# Patient Record
Sex: FEMALE | Race: WHITE | Employment: FULL TIME | URBAN - METROPOLITAN AREA
[De-identification: names, ages, dates, MRNs, and addresses within clinical notes are randomized per-mention and may not be internally consistent; named-entity substitution may affect disease eponyms.]

---

## 2017-09-11 PROBLEM — M19.041 ARTHRITIS OF RIGHT HAND: Status: ACTIVE | Noted: 2017-09-11

## 2017-09-11 PROBLEM — M19.042 ARTHRITIS OF LEFT HAND: Status: ACTIVE | Noted: 2017-09-11

## 2018-05-22 ENCOUNTER — HOSPITAL ENCOUNTER (OUTPATIENT)
Dept: GENERAL RADIOLOGY | Age: 53
Discharge: HOME | End: 2018-05-24
Payer: COMMERCIAL

## 2018-05-22 ENCOUNTER — HOSPITAL ENCOUNTER (OUTPATIENT)
Age: 53
Discharge: HOME | End: 2018-05-24
Payer: COMMERCIAL

## 2018-05-22 DIAGNOSIS — M25.511 ARTHRALGIA OF RIGHT ACROMIOCLAVICULAR JOINT: ICD-10-CM

## 2018-05-22 PROCEDURE — 73000 X-RAY EXAM OF COLLAR BONE: CPT

## 2019-02-15 ENCOUNTER — HOSPITAL ENCOUNTER (OUTPATIENT)
Age: 54
Discharge: HOME | End: 2019-02-17
Payer: COMMERCIAL

## 2019-02-15 DIAGNOSIS — M19.049 ARTHRITIS PAIN OF HAND: ICD-10-CM

## 2019-02-15 DIAGNOSIS — M79.645 PAIN OF FINGER OF LEFT HAND: ICD-10-CM

## 2019-02-15 PROCEDURE — 86200 CCP ANTIBODY: CPT

## 2019-02-18 LAB — CCP IGG ANTIBODIES: 2 UNITS (ref 0–19)

## 2020-07-05 ENCOUNTER — HOSPITAL ENCOUNTER (EMERGENCY)
Age: 55
Discharge: HOME | End: 2020-07-05
Payer: COMMERCIAL

## 2020-07-05 VITALS
OXYGEN SATURATION: 99 % | BODY MASS INDEX: 21.6 KG/M2 | SYSTOLIC BLOOD PRESSURE: 123 MMHG | TEMPERATURE: 98.6 F | DIASTOLIC BLOOD PRESSURE: 76 MMHG | HEIGHT: 60 IN | RESPIRATION RATE: 16 BRPM | WEIGHT: 110 LBS | HEART RATE: 80 BPM

## 2020-07-05 PROCEDURE — 99212 OFFICE O/P EST SF 10 MIN: CPT

## 2020-07-05 RX ORDER — IBUPROFEN 800 MG/1
800 TABLET ORAL EVERY 6 HOURS PRN
Qty: 21 TABLET | Refills: 0 | Status: SHIPPED | OUTPATIENT
Start: 2020-07-05 | End: 2022-08-17

## 2020-07-05 RX ORDER — PENICILLIN V POTASSIUM 500 MG/1
500 TABLET ORAL 4 TIMES DAILY
Qty: 40 TABLET | Refills: 0 | Status: SHIPPED | OUTPATIENT
Start: 2020-07-05 | End: 2020-07-15

## 2020-07-05 ASSESSMENT — PAIN DESCRIPTION - PAIN TYPE
TYPE: ACUTE PAIN
TYPE: ACUTE PAIN

## 2020-07-05 ASSESSMENT — PAIN DESCRIPTION - FREQUENCY
FREQUENCY: CONTINUOUS
FREQUENCY: CONTINUOUS

## 2020-07-05 ASSESSMENT — PAIN SCALES - GENERAL
PAINLEVEL_OUTOF10: 10
PAINLEVEL_OUTOF10: 10

## 2020-07-05 ASSESSMENT — PAIN DESCRIPTION - ORIENTATION
ORIENTATION: LEFT
ORIENTATION: LEFT

## 2020-07-05 ASSESSMENT — PAIN DESCRIPTION - LOCATION
LOCATION: FACE;MOUTH;JAW
LOCATION: FACE;JAW;MOUTH

## 2020-07-05 ASSESSMENT — PAIN DESCRIPTION - ONSET
ONSET: ON-GOING
ONSET: ON-GOING

## 2020-07-05 ASSESSMENT — PAIN DESCRIPTION - PROGRESSION
CLINICAL_PROGRESSION: GRADUALLY WORSENING
CLINICAL_PROGRESSION: NOT CHANGED

## 2020-07-05 ASSESSMENT — PAIN DESCRIPTION - DESCRIPTORS
DESCRIPTORS: THROBBING
DESCRIPTORS: ACHING;THROBBING

## 2020-07-05 ASSESSMENT — PAIN - FUNCTIONAL ASSESSMENT: PAIN_FUNCTIONAL_ASSESSMENT: 0-10

## 2020-07-05 NOTE — ED PROVIDER NOTES
HPI: Anne Carolina 62 y.o. female with a past medical history of   Past Medical History:   Diagnosis Date    Chronic idiopathic urticaria 02/2014    Hyperlipidemia     slight not on meds    Nausea & vomiting     OA (osteoarthritis) of finger     presents with a complaint of dental pain. The patient states this pain has been gradual in onset, persistent, moderate in severity and worse today which is what prompted the visit. Pain has not been relieved with any OTC medications. Patient denies any unilateral facial swelling. Patient is able to handle their own secretions and drink fluids without difficulty. Patient denies any fever. The patient also denies any history of dental trauma. Denies difficulty breathing or swallowing. The location of the pain and appears to be isolated over tooth number 18. Complaining of discomfort to the stated tooth for the last several days called her dentist but was advised to go to the urgent care or emergency room for treatment until she can be seen by her dentist.  She denies any fever.     ROS:   Pertinent positives and negatives are stated within HPI, all other systems reviewed and are negative.  --------------------------------------------- PAST HISTORY ---------------------------------------------  Past Medical History:  has a past medical history of Chronic idiopathic urticaria, Hyperlipidemia, Nausea & vomiting, and OA (osteoarthritis) of finger. Past Surgical History:  has a past surgical history that includes Cholecystectomy (10/2007); Colonoscopy (08/09/2011); Upper gastrointestinal endoscopy (08/09/2011); and fracture surgery (aug 2012). Social History:  reports that she has never smoked. She has never used smokeless tobacco. She reports current alcohol use of about 2.0 standard drinks of alcohol per week. She reports that she does not use drugs. Family History: family history includes Cancer in her sister; Colon Cancer in her father;  Other in her father and sister; Stroke in her mother. The patients home medications have been reviewed. Allergies: Vicodin [hydrocodone-acetaminophen]    ------------------------- NURSING NOTES AND VITALS REVIEWED ---------------------------   The nursing notes within the ED encounter and vital signs as below have been reviewed by myself. /76   Pulse 80   Temp 98.6 °F (37 °C) (Temporal)   Resp 16   Ht 5' (1.524 m)   Wt 110 lb (49.9 kg)   LMP 08/13/2012   SpO2 99%   BMI 21.48 kg/m²   Oxygen Saturation Interpretation: Normal    The patients available past medical records and past encounters were reviewed. Physical exam:  Constitutional: The patient is comfortable, alert and oriented x3, well appearing, non toxic in NAD. Head: Atraumatic and normocephalic. Eyes: No discharge from the eyes the sclerae are normal.  ENT: The oropharynx is normal. No pharyngeal erythema, uvular edema, tonsillar exudates, asymmetry or trismus. Mouth is normal to inspection  With the exception of a pain on percussion of the tooth noted above. There is no evidence of facial asymmetry or abscess formation. Floor of the mouth is soft. No tenderness in the submental or submandibular space. No tongue elevation. Dental caries noted at the stated tooth without any evidence of abscess formation. Neck: The neck demonstrates normal range of motion. No meningeals signs are present. No stridor. Respiratory/chest: The chest is nontender. Breath sounds are normal. no respiratory distress is noted  Cardiovascular: Heart shows a regular rate and rhythm no murmurs no rubs no gallops. Skin: The skin exam shows no evidence of rashes  Neuro: GCS is 15  Lymphatic: No cervical lymphadenopathy       -------------------------------------------------- RESULTS -------------------------------------------------  I have personally reviewed all laboratory and imaging results for this patient. Results are listed below.      LABS:  No results found for this visit on 07/05/20. RADIOLOGY:  Interpreted by Radiologist.  No orders to display         Advised to follow-up with her dentist the beginning of the week will be started on antibiotics in the interim.     Medical Decision Making: Exam and history c/w  dental pain without evidence of gross infection. At this time we will  the patient on following up in dental clinic and provide pain relief.       Impression:   1) Dental Pain  2) Dental Caries    Disposition: Discharge  Condition: Stable               MARS Mack - CNP  07/05/20 [id number]

## 2020-09-18 ENCOUNTER — HOSPITAL ENCOUNTER (OUTPATIENT)
Age: 55
Discharge: HOME | End: 2020-09-20
Payer: COMMERCIAL

## 2020-09-18 LAB
ALBUMIN SERPL-MCNC: 4.9 G/DL (ref 3.5–5.2)
ALP BLD-CCNC: 93 U/L (ref 35–104)
ALT SERPL-CCNC: 23 U/L (ref 0–32)
ANION GAP SERPL CALCULATED.3IONS-SCNC: 12 MMOL/L (ref 7–16)
AST SERPL-CCNC: 33 U/L (ref 0–31)
BASOPHILS ABSOLUTE: 0.07 E9/L (ref 0–0.2)
BASOPHILS RELATIVE PERCENT: 1.5 % (ref 0–2)
BILIRUB SERPL-MCNC: 0.4 MG/DL (ref 0–1.2)
BUN BLDV-MCNC: 17 MG/DL (ref 6–20)
CALCIUM SERPL-MCNC: 9.9 MG/DL (ref 8.6–10.2)
CHLORIDE BLD-SCNC: 102 MMOL/L (ref 98–107)
CHOLESTEROL, TOTAL: 254 MG/DL (ref 0–199)
CO2: 26 MMOL/L (ref 22–29)
CREAT SERPL-MCNC: 1.2 MG/DL (ref 0.5–1)
EOSINOPHILS ABSOLUTE: 0.21 E9/L (ref 0.05–0.5)
EOSINOPHILS RELATIVE PERCENT: 4.6 % (ref 0–6)
GFR AFRICAN AMERICAN: 56
GFR NON-AFRICAN AMERICAN: 46 ML/MIN/1.73
GLUCOSE BLD-MCNC: 88 MG/DL (ref 74–99)
HBA1C MFR BLD: 5.5 % (ref 4–5.6)
HCT VFR BLD CALC: 34.1 % (ref 34–48)
HDLC SERPL-MCNC: 104 MG/DL
HEMOGLOBIN: 10.9 G/DL (ref 11.5–15.5)
IMMATURE GRANULOCYTES #: 0.01 E9/L
IMMATURE GRANULOCYTES %: 0.2 % (ref 0–5)
LDL CHOLESTEROL CALCULATED: 129 MG/DL (ref 0–99)
LYMPHOCYTES ABSOLUTE: 1.44 E9/L (ref 1.5–4)
LYMPHOCYTES RELATIVE PERCENT: 31.2 % (ref 20–42)
MAGNESIUM: 2.2 MG/DL (ref 1.6–2.6)
MCH RBC QN AUTO: 29.5 PG (ref 26–35)
MCHC RBC AUTO-ENTMCNC: 32 % (ref 32–34.5)
MCV RBC AUTO: 92.2 FL (ref 80–99.9)
MONOCYTES ABSOLUTE: 0.35 E9/L (ref 0.1–0.95)
MONOCYTES RELATIVE PERCENT: 7.6 % (ref 2–12)
NEUTROPHILS ABSOLUTE: 2.53 E9/L (ref 1.8–7.3)
NEUTROPHILS RELATIVE PERCENT: 54.9 % (ref 43–80)
PDW BLD-RTO: 12.6 FL (ref 11.5–15)
PHOSPHORUS: 3.9 MG/DL (ref 2.5–4.5)
PLATELET # BLD: 406 E9/L (ref 130–450)
PMV BLD AUTO: 10.4 FL (ref 7–12)
POTASSIUM SERPL-SCNC: 4.8 MMOL/L (ref 3.5–5)
RBC # BLD: 3.7 E12/L (ref 3.5–5.5)
SODIUM BLD-SCNC: 140 MMOL/L (ref 132–146)
TOTAL PROTEIN: 7.3 G/DL (ref 6.4–8.3)
TRIGL SERPL-MCNC: 103 MG/DL (ref 0–149)
TSH SERPL DL<=0.05 MIU/L-ACNC: 1.48 UIU/ML (ref 0.27–4.2)
VITAMIN D 25-HYDROXY: 46 NG/ML (ref 30–100)
VLDLC SERPL CALC-MCNC: 21 MG/DL
WBC # BLD: 4.6 E9/L (ref 4.5–11.5)

## 2020-09-18 PROCEDURE — 80061 LIPID PANEL: CPT

## 2020-09-18 PROCEDURE — 80053 COMPREHEN METABOLIC PANEL: CPT

## 2020-09-18 PROCEDURE — 85025 COMPLETE CBC W/AUTO DIFF WBC: CPT

## 2020-09-18 PROCEDURE — 83036 HEMOGLOBIN GLYCOSYLATED A1C: CPT

## 2020-09-18 PROCEDURE — 84100 ASSAY OF PHOSPHORUS: CPT

## 2020-09-18 PROCEDURE — 84443 ASSAY THYROID STIM HORMONE: CPT

## 2020-09-18 PROCEDURE — 82306 VITAMIN D 25 HYDROXY: CPT

## 2020-09-18 PROCEDURE — 83735 ASSAY OF MAGNESIUM: CPT

## 2020-11-17 ENCOUNTER — OFFICE VISIT (OUTPATIENT)
Dept: ORTHOPEDIC SURGERY | Age: 55
End: 2020-11-17
Payer: COMMERCIAL

## 2020-11-17 VITALS — HEIGHT: 60 IN | WEIGHT: 110 LBS | TEMPERATURE: 98 F | BODY MASS INDEX: 21.6 KG/M2

## 2020-11-17 PROCEDURE — G8484 FLU IMMUNIZE NO ADMIN: HCPCS | Performed by: ORTHOPAEDIC SURGERY

## 2020-11-17 PROCEDURE — 3017F COLORECTAL CA SCREEN DOC REV: CPT | Performed by: ORTHOPAEDIC SURGERY

## 2020-11-17 PROCEDURE — G8427 DOCREV CUR MEDS BY ELIG CLIN: HCPCS | Performed by: ORTHOPAEDIC SURGERY

## 2020-11-17 PROCEDURE — 1036F TOBACCO NON-USER: CPT | Performed by: ORTHOPAEDIC SURGERY

## 2020-11-17 PROCEDURE — G8420 CALC BMI NORM PARAMETERS: HCPCS | Performed by: ORTHOPAEDIC SURGERY

## 2020-11-17 PROCEDURE — 99203 OFFICE O/P NEW LOW 30 MIN: CPT | Performed by: ORTHOPAEDIC SURGERY

## 2020-11-17 NOTE — PROGRESS NOTES
Chief Complaint   Patient presents with    Shoulder Pain     new patient right shoulder pain. Patient was kicked by a horse 2 years ago. Hitting her jaw, shoulder and clavicle. Patient states pain got better and was not constant. Recently over the past 6 months pain is continous. Maldonado Villalpando is a 60y.o. year old   female who is seen today  for evaluation of right shoulder pain. She reports the pain has been ongoing for the past 2 years. She does recall a specific injury which started the pain. Patient was kicked by a horse 2 years ago. She reports the pain is worse with activity, better with rest.  The patient does not have mechanical symptoms. Shedoes have night pain. She denies a feeling of instability. The prior treatments have been NSAID tylenol arthritis and advil. The patient   has not responded to the treatment. The patient is right hand dominant. .       Chief Complaint   Patient presents with    Shoulder Pain     new patient right shoulder pain. Patient was kicked by a horse 2 years ago. Hitting her jaw, shoulder and clavicle. Patient states pain got better and was not constant. Recently over the past 6 months pain is continous.      Past Medical History:   Diagnosis Date    Chronic idiopathic urticaria 02/2014    Hyperlipidemia     slight not on meds    Nausea & vomiting     OA (osteoarthritis) of finger      Past Surgical History:   Procedure Laterality Date    CHOLECYSTECTOMY  10/2007    COLONOSCOPY  08/09/2011    DODIG    FRACTURE SURGERY  aug 2012    open reduction internal fixation left wrist    UPPER GASTROINTESTINAL ENDOSCOPY  08/09/2011       Current Outpatient Medications:     doxepin (SINEQUAN) 10 MG capsule, TAKE 1 CAPSULE BY MOUTH TWO TIMES DAILY, Disp: 180 capsule, Rfl: 2    ibuprofen (ADVIL;MOTRIN) 800 MG tablet, Take 1 tablet by mouth every 6 hours as needed for Pain, Disp: 21 tablet, Rfl: 0    Multiple Vitamins-Minerals (HAIR SKIN AND NAILS FORMULA PO), Take 1 tablet by mouth daily, Disp: , Rfl:   Allergies   Allergen Reactions    Vicodin [Hydrocodone-Acetaminophen] Nausea And Vomiting     Social History     Socioeconomic History    Marital status:      Spouse name: Willie Diaz    Number of children: 2    Years of education: BSN    Highest education level: Not on file   Occupational History    Occupation: RN   Social Needs    Financial resource strain: Not hard at all   [employer] insecurity     Worry: Never true     Inability: Never true   [employer] needs     Medical: No     Non-medical: No   Tobacco Use    Smoking status: Never Smoker    Smokeless tobacco: Never Used   Substance and Sexual Activity    Alcohol use: Yes     Alcohol/week: 2.0 standard drinks     Types: 2 Glasses of wine per week     Comment: occ 1-2 per week    Drug use: No    Sexual activity: Yes     Partners: Male     Birth control/protection: Post-menopausal   Lifestyle    Physical activity     Days per week: 5 days     Minutes per session: 60 min    Stress: Only a little   Relationships    Social connections     Talks on phone: More than three times a week     Gets together: More than three times a week     Attends Rastafarian service: More than 4 times per year     Active member of club or organization: Yes     Attends meetings of clubs or organizations: More than 4 times per year     Relationship status:     Intimate partner violence     Fear of current or ex partner: No     Emotionally abused: No     Physically abused: No     Forced sexual activity: No   Other Topics Concern    Not on file   Social History Narrative    Not on file     Family History   Problem Relation Age of Onset    Stroke Mother     Colon Cancer Father     Other Father     Cancer Sister         Breast    Other Sister        REVIEW OF SYSTEMS:     General/Constitution:  (-)weight loss, (-)fever, (-)chills, (-)weakness. Skin: (-) rash,(-) psoriasis,(-) eczema, (-)skin cancer. Musculoskeletal: (-) fractures,  (-) dislocations,(-) collagen vascular disease, (-) fibromyalgia, (-) multiple sclerosis, (-) muscular dystrophy, (-) RSD,(-) joint pain (-)swelling, (-) joint pain,swelling. Neurologic: (-) epilepsy, (-)seizures,(-) brain tumor,(-) TIA, (-)stroke, (-)headaches, (-)Parkinson disease,(-) memory loss, (-) LOC. Cardiovascular: (-) Chest pain, (-) swelling in legs/feet, (-) SOB, (-) cramping in legs/feet with walking. Respiratory: (-) SOB, (-) Coughing, (-) night sweats. GI: (-) nausea, (-) vomiting, (-) diarrhea, (-) blood in stool, (-) gastric ulcer. Psychiatric: (-) Depression, (-) Anxiety, (-) bipolar disease, (-) Alzheimer's Disease  Allergic/Immunologic: (-) allergies latex, (-) allergies metal, (-) skin sensitivity. Hematlogic: (-) anemia, (-) blood transfusion, (-) DVT/PE, (-) Clotting disorders      Subjective:    Constitution:  Temp 98 °F (36.7 °C)   Ht 5' (1.524 m)   Wt 110 lb (49.9 kg)   LMP 08/13/2012   BMI 21.48 kg/m²     Psycihatric:  The patient is alert and oriented x 3, appears to be stated age and in no distress. Respiratory:  Respiratory effort is not labored. Patient is not gasping. Palpation of the chest reveals no tactile fremitus. Skin:  Upon inspection: the skin appears warm, dry and intact. There is not a previous scar over the affected area. There is not any cellulitis, lymphedema or cutaneous lesions noted in the lower extremities. Upon palpation there is no induration noted. Neurologic:  Motor exam of the upper extremities show: The reflexes in biceps/triceps/brachioradialis are equal and symmetric. Sensory exam C5-T1 are normal bilaterally. Cardiovascular: The vascular exam is normal and is well perfused to distal extremities. There are 2+ radial pulses bilaterally, and motor and sensation is intact to median, ulnar, and radial, musclocutaneus, and axillary nerve distribution and grossly symmetric bilaterally.   There is cap refill noted less than two seconds in all digits. There is not edema of the bilateral upper extremities. There is not varicosities noted in the distal extremities. Lymph:  Upon palpation,  there is no lymphadenopathy noted in bilateral upper extremities. Musculoskeletal:  Gait: normal; examination of the nails and digits reveal no cyanosis or clubbing. Cervical Exam:  On physical exam, Maldonado Villalpando is well-developed, well-nourished, oriented to person, place and time. her gait is normal.  On evaluation of hercervical spine, She has full range of motion of the cervical spine without pain. There is no cervical tenderness to palpation. Shoulder Exam:  On evaluation of her bilaterally upper extremities, her right shoulder has no deformity. There is tenderness upon palpation of the anterior and bicep. There is not evidence of scapular dyskinesis. There is not muscle atrophy in shoulder girdle. The range of motion for the Right Shoulder is 140/40/t10 and for the Left shoulder is 150/45/t8. Right shoulder Motor strength is 5/5 in the supraspinatus, 5/5 internal rotation and 5/5 in external rotation, and Left shoulder motor strength 5/5 in supraspinatus, 5/5 in internal rotation, 5/5 in external rotation. Right shoulder:  negative Impingement , negative Hart ,positive  Speeds,negativeApprehension ,negative Dick Load Shift, negative Arleth manuver, negative Cross arm test.     Left shoulder:    negative Impingement , negative Hart ,negative  Speeds,negative  Apprehension   ,negative Dick Load Shift, negative Arleth manuver, negative Cross arm test.     XRAY:   Impression    No evidence of shoulder fracture or dislocation. RECOMMENDATION:    None. MRI:    None today    Radiographic findings reviewed with patient    Impression:   Encounter Diagnosis   Name Primary?  Biceps tendonitis on right Yes       Plan: Natural history and expected course discussed. Questions answered. Educational material distributed. Reduction in offending activity.   Gentle ROM exercises   Bicep tendon injection tomorrow

## 2020-11-18 ENCOUNTER — NURSE ONLY (OUTPATIENT)
Dept: ORTHOPEDIC SURGERY | Age: 55
End: 2020-11-18
Payer: COMMERCIAL

## 2020-11-18 RX ORDER — TRIAMCINOLONE ACETONIDE 40 MG/ML
40 INJECTION, SUSPENSION INTRA-ARTICULAR; INTRAMUSCULAR ONCE
Status: COMPLETED | OUTPATIENT
Start: 2020-11-18 | End: 2020-11-18

## 2020-11-18 RX ADMIN — TRIAMCINOLONE ACETONIDE 40 MG: 40 INJECTION, SUSPENSION INTRA-ARTICULAR; INTRAMUSCULAR at 14:10

## 2020-11-18 NOTE — PROGRESS NOTES
Chief Complaint   Patient presents with    Injections     right bicep tendon injection      I will proceed with a cortisone injection in the Right shoulder. Verbal and written consent was obtained for the injection. An ultrasound unit was used to identify the bicep tendon. Skin was prepped with alcohol, 1 ml of Kenalog 40mg and 1 ml of 0.25% Marcaine was injected into the anterior aspect into the bicep tendon of the Right shoulder using ultrasound guidance. The patient tolerated the injections well. I will see the patient back in 4 weeks if no improvement    ]   Diagnosis Orders   1. Biceps tendonitis on right  VT INJECT TENDON SHEATH/LIGAMENT     .

## 2021-01-05 ENCOUNTER — OFFICE VISIT (OUTPATIENT)
Dept: ORTHOPEDIC SURGERY | Age: 56
End: 2021-01-05
Payer: COMMERCIAL

## 2021-01-05 VITALS — TEMPERATURE: 98 F | WEIGHT: 110 LBS | HEIGHT: 60 IN | BODY MASS INDEX: 21.6 KG/M2

## 2021-01-05 DIAGNOSIS — S46.011A TRAUMATIC COMPLETE TEAR OF RIGHT ROTATOR CUFF, INITIAL ENCOUNTER: ICD-10-CM

## 2021-01-05 DIAGNOSIS — M75.21 BICEPS TENDONITIS ON RIGHT: Primary | ICD-10-CM

## 2021-01-05 PROCEDURE — 3017F COLORECTAL CA SCREEN DOC REV: CPT | Performed by: ORTHOPAEDIC SURGERY

## 2021-01-05 PROCEDURE — G8420 CALC BMI NORM PARAMETERS: HCPCS | Performed by: ORTHOPAEDIC SURGERY

## 2021-01-05 PROCEDURE — 99213 OFFICE O/P EST LOW 20 MIN: CPT | Performed by: ORTHOPAEDIC SURGERY

## 2021-01-05 PROCEDURE — G8427 DOCREV CUR MEDS BY ELIG CLIN: HCPCS | Performed by: ORTHOPAEDIC SURGERY

## 2021-01-05 PROCEDURE — G8484 FLU IMMUNIZE NO ADMIN: HCPCS | Performed by: ORTHOPAEDIC SURGERY

## 2021-01-05 PROCEDURE — 1036F TOBACCO NON-USER: CPT | Performed by: ORTHOPAEDIC SURGERY

## 2021-01-05 NOTE — PROGRESS NOTES
Chief Complaint   Patient presents with    Shoulder Pain     Right Shoulder F/U, had cortisone injection on 11/17/2020 with a month of relief. Texie Holstein is a 60y.o. year old   female who is seen today  for evaluation of right shoulder pain. She had bicep tendon injection 3 weeks ago with relief to bicep tendon region however she is unable to lift her arm about her head now and has experienced weakness and new lateral shoulder pain. Chief Complaint   Patient presents with    Shoulder Pain     Right Shoulder F/U, had cortisone injection on 11/17/2020 with a month of relief.      Past Medical History:   Diagnosis Date    Chronic idiopathic urticaria 02/2014    Hyperlipidemia     slight not on meds    Nausea & vomiting     OA (osteoarthritis) of finger      Past Surgical History:   Procedure Laterality Date    CHOLECYSTECTOMY  10/2007    COLONOSCOPY  08/09/2011    DODIG    FRACTURE SURGERY  aug 2012    open reduction internal fixation left wrist    UPPER GASTROINTESTINAL ENDOSCOPY  08/09/2011       Current Outpatient Medications:     doxepin (SINEQUAN) 10 MG capsule, TAKE 1 CAPSULE BY MOUTH TWO TIMES DAILY, Disp: 180 capsule, Rfl: 2    ibuprofen (ADVIL;MOTRIN) 800 MG tablet, Take 1 tablet by mouth every 6 hours as needed for Pain, Disp: 21 tablet, Rfl: 0    Multiple Vitamins-Minerals (HAIR SKIN AND NAILS FORMULA PO), Take 1 tablet by mouth daily, Disp: , Rfl:   Allergies   Allergen Reactions    Vicodin [Hydrocodone-Acetaminophen] Nausea And Vomiting     Social History     Socioeconomic History    Marital status:      Spouse name: Eduardo Mcbride    Number of children: 2    Years of education: BSN    Highest education level: Not on file   Occupational History    Occupation: RN   Social Needs    Financial resource strain: Not hard at all   [employer] insecurity     Worry: Never true     Inability: Never true   [employer] needs     Medical: No     Non-medical: No Tobacco Use    Smoking status: Never Smoker    Smokeless tobacco: Never Used   Substance and Sexual Activity    Alcohol use: Yes     Alcohol/week: 2.0 standard drinks     Types: 2 Glasses of wine per week     Comment: occ 1-2 per week    Drug use: No    Sexual activity: Yes     Partners: Male     Birth control/protection: Post-menopausal   Lifestyle    Physical activity     Days per week: 5 days     Minutes per session: 60 min    Stress: Only a little   Relationships    Social connections     Talks on phone: More than three times a week     Gets together: More than three times a week     Attends [employer] service: More than 4 times per year     Active member of club or organization: Yes     Attends meetings of clubs or organizations: More than 4 times per year     Relationship status:     Intimate partner violence     Fear of current or ex partner: No     Emotionally abused: No     Physically abused: No     Forced sexual activity: No   Other Topics Concern    Not on file   Social History Narrative    Not on file     Family History   Problem Relation Age of Onset    Stroke Mother     Colon Cancer Father     Other Father     Cancer Sister         Breast    Other Sister        REVIEW OF SYSTEMS:     General/Constitution:  (-)weight loss, (-)fever, (-)chills, (-)weakness. Skin: (-) rash,(-) psoriasis,(-) eczema, (-)skin cancer. Musculoskeletal: (-) fractures,  (-) dislocations,(-) collagen vascular disease, (-) fibromyalgia, (-) multiple sclerosis, (-) muscular dystrophy, (-) RSD,(-) joint pain (-)swelling, (-) joint pain,swelling. Neurologic: (-) epilepsy, (-)seizures,(-) brain tumor,(-) TIA, (-)stroke, (-)headaches, (-)Parkinson disease,(-) memory loss, (-) LOC. Cardiovascular: (-) Chest pain, (-) swelling in legs/feet, (-) SOB, (-) cramping in legs/feet with walking. Respiratory: (-) SOB, (-) Coughing, (-) night sweats.   GI: (-) nausea, (-) vomiting, (-) diarrhea, (-) blood in stool, (-) gastric ulcer. Psychiatric: (-) Depression, (-) Anxiety, (-) bipolar disease, (-) Alzheimer's Disease  Allergic/Immunologic: (-) allergies latex, (-) allergies metal, (-) skin sensitivity. Hematlogic: (-) anemia, (-) blood transfusion, (-) DVT/PE, (-) Clotting disorders      Subjective:    Constitution:  Temp 98 °F (36.7 °C)   Ht 5' (1.524 m)   Wt 110 lb (49.9 kg)   LMP 08/13/2012   BMI 21.48 kg/m²     Psycihatric:  The patient is alert and oriented x 3, appears to be stated age and in no distress. Respiratory:  Respiratory effort is not labored. Patient is not gasping. Palpation of the chest reveals no tactile fremitus. Skin:  Upon inspection: the skin appears warm, dry and intact. There is not a previous scar over the affected area. There is not any cellulitis, lymphedema or cutaneous lesions noted in the lower extremities. Upon palpation there is no induration noted. Neurologic:  Motor exam of the upper extremities show: The reflexes in biceps/triceps/brachioradialis are equal and symmetric. Sensory exam C5-T1 are normal bilaterally. Cardiovascular: The vascular exam is normal and is well perfused to distal extremities. There are 2+ radial pulses bilaterally, and motor and sensation is intact to median, ulnar, and radial, musclocutaneus, and axillary nerve distribution and grossly symmetric bilaterally. There is cap refill noted less than two seconds in all digits. There is not edema of the bilateral upper extremities. There is not varicosities noted in the distal extremities. Lymph:  Upon palpation,  there is no lymphadenopathy noted in bilateral upper extremities. Musculoskeletal:  Gait: normal; examination of the nails and digits reveal no cyanosis or clubbing. Cervical Exam:  On physical exam, Kayla Law is well-developed, well-nourished, oriented to person, place and time.   her gait is normal.  On evaluation of hercervical spine, She has full range of motion of the cervical spine without pain. There is no cervical tenderness to palpation. Shoulder Exam:  On evaluation of her bilaterally upper extremities, her right shoulder has no deformity. There is tenderness upon palpation of the anterior and bicep. There is not evidence of scapular dyskinesis. There is not muscle atrophy in shoulder girdle. The range of motion for the Right Shoulder is 120/30/t10 and for the Left shoulder is 150/45/t8. Right shoulder Motor strength is 4/5 in the supraspinatus, 5/5 internal rotation and 4/5 in external rotation, and Left shoulder motor strength 5/5 in supraspinatus, 5/5 in internal rotation, 5/5 in external rotation. Right shoulder:  negative Impingement , negative Hart ,positive  Speeds,negativeApprehension ,negative Dick Load Shift, negative Arleth manuver, negative Cross arm test.     Left shoulder:    negative Impingement , negative Hart ,negative  Speeds,negative  Apprehension   ,negative Dick Load Shift, negative Arleth manuver, negative Cross arm test.     XRAY:   Impression    No evidence of shoulder fracture or dislocation. RECOMMENDATION:    None. MRI:    None today    Radiographic findings reviewed with patient    Impression:   Encounter Diagnoses   Name Primary?  Biceps tendonitis on right Yes    Traumatic complete tear of right rotator cuff, initial encounter        Plan: Natural history and expected course discussed. Questions answered. Educational material distributed. Reduction in offending activity.   Gentle ROM exercises   MRI right shoulder

## 2021-01-07 ENCOUNTER — TELEPHONE (OUTPATIENT)
Dept: ORTHOPEDIC SURGERY | Age: 56
End: 2021-01-07

## 2021-01-07 DIAGNOSIS — S46.011A TRAUMATIC COMPLETE TEAR OF RIGHT ROTATOR CUFF, INITIAL ENCOUNTER: Primary | ICD-10-CM

## 2021-01-07 RX ORDER — TRAMADOL HYDROCHLORIDE 50 MG/1
50 TABLET ORAL EVERY 6 HOURS PRN
Qty: 28 TABLET | Refills: 0 | Status: SHIPPED | OUTPATIENT
Start: 2021-01-07 | End: 2021-01-14

## 2021-01-07 NOTE — TELEPHONE ENCOUNTER
Pt came in on the 5th of January seen DR. Ronen iPnto but refused any meds because she didn't feel she needed them, well today she is having some pain and wants to know if we can call in something she said Ronen Pinto recommended tramadol or prednisone. She wasn't sure if she could get both or just one.      GIANT RICCARDO [address], [address] - [address] [phone]

## 2021-01-11 ENCOUNTER — OFFICE VISIT (OUTPATIENT)
Dept: ORTHOPEDIC SURGERY | Age: 56
End: 2021-01-11
Payer: COMMERCIAL

## 2021-01-11 VITALS — HEIGHT: 60 IN | WEIGHT: 110 LBS | TEMPERATURE: 98 F | BODY MASS INDEX: 21.6 KG/M2

## 2021-01-11 DIAGNOSIS — S46.011A TRAUMATIC COMPLETE TEAR OF RIGHT ROTATOR CUFF, INITIAL ENCOUNTER: Primary | ICD-10-CM

## 2021-01-11 DIAGNOSIS — M75.21 BICEPS TENDONITIS ON RIGHT: ICD-10-CM

## 2021-01-11 PROCEDURE — G8427 DOCREV CUR MEDS BY ELIG CLIN: HCPCS | Performed by: ORTHOPAEDIC SURGERY

## 2021-01-11 PROCEDURE — G8484 FLU IMMUNIZE NO ADMIN: HCPCS | Performed by: ORTHOPAEDIC SURGERY

## 2021-01-11 PROCEDURE — G8420 CALC BMI NORM PARAMETERS: HCPCS | Performed by: ORTHOPAEDIC SURGERY

## 2021-01-11 PROCEDURE — 1036F TOBACCO NON-USER: CPT | Performed by: ORTHOPAEDIC SURGERY

## 2021-01-11 PROCEDURE — 3017F COLORECTAL CA SCREEN DOC REV: CPT | Performed by: ORTHOPAEDIC SURGERY

## 2021-01-11 PROCEDURE — 99214 OFFICE O/P EST MOD 30 MIN: CPT | Performed by: ORTHOPAEDIC SURGERY

## 2021-01-11 NOTE — PROGRESS NOTES
Chief Complaint   Patient presents with    Shoulder Pain     right shoulder MRI Results        Cornell Schulz is a 60y.o. year old   female who is seen today  for evaluation of right shoulder pain. She had bicep tendon injection 3 weeks ago with relief to bicep tendon region however she is unable to lift her arm about her head now and has experienced weakness and new lateral shoulder pain. She is here today for mri results. Chief Complaint   Patient presents with    Shoulder Pain     right shoulder MRI Results     Past Medical History:   Diagnosis Date    Chronic idiopathic urticaria 02/2014    Hyperlipidemia     slight not on meds    Nausea & vomiting     OA (osteoarthritis) of finger      Past Surgical History:   Procedure Laterality Date    CHOLECYSTECTOMY  10/2007    COLONOSCOPY  08/09/2011    DODIG    FRACTURE SURGERY  aug 2012    open reduction internal fixation left wrist    UPPER GASTROINTESTINAL ENDOSCOPY  08/09/2011       Current Outpatient Medications:     traMADol (ULTRAM) 50 MG tablet, Take 1 tablet by mouth every 6 hours as needed for Pain for up to 7 days. Intended supply: 7 days.  Take lowest dose possible to manage pain, Disp: 28 tablet, Rfl: 0    doxepin (SINEQUAN) 10 MG capsule, TAKE 1 CAPSULE BY MOUTH TWO TIMES DAILY, Disp: 180 capsule, Rfl: 2    ibuprofen (ADVIL;MOTRIN) 800 MG tablet, Take 1 tablet by mouth every 6 hours as needed for Pain, Disp: 21 tablet, Rfl: 0    Multiple Vitamins-Minerals (HAIR SKIN AND NAILS FORMULA PO), Take 1 tablet by mouth daily, Disp: , Rfl:   Allergies   Allergen Reactions    Vicodin [Hydrocodone-Acetaminophen] Nausea And Vomiting     Social History     Socioeconomic History    Marital status:      Spouse name: Willie Valencia    Number of children: 2    Years of education: BSN    Highest education level: Not on file   Occupational History    Occupation: RN   Social Needs    Financial resource strain: Not hard at all   [place] insecurity     Worry: Never true     Inability: Never true    Transportation needs     Medical: No     Non-medical: No   Tobacco Use    Smoking status: Never Smoker    Smokeless tobacco: Never Used   Substance and Sexual Activity    Alcohol use: Yes     Alcohol/week: 2.0 standard drinks     Types: 2 Glasses of wine per week     Comment: occ 1-2 per week    Drug use: No    Sexual activity: Yes     Partners: Male     Birth control/protection: Post-menopausal   Lifestyle    Physical activity     Days per week: 5 days     Minutes per session: 60 min    Stress: Only a little   Relationships    Social connections     Talks on phone: More than three times a week     Gets together: More than three times a week     Attends [employer] service: More than 4 times per year     Active member of club or organization: Yes     Attends meetings of clubs or organizations: More than 4 times per year     Relationship status:     Intimate partner violence     Fear of current or ex partner: No     Emotionally abused: No     Physically abused: No     Forced sexual activity: No   Other Topics Concern    Not on file   Social History Narrative    Not on file     Family History   Problem Relation Age of Onset    Stroke Mother     Colon Cancer Father     Other Father     Cancer Sister         Breast    Other Sister        REVIEW OF SYSTEMS:     General/Constitution:  (-)weight loss, (-)fever, (-)chills, (-)weakness. Skin: (-) rash,(-) psoriasis,(-) eczema, (-)skin cancer. Musculoskeletal: (-) fractures,  (-) dislocations,(-) collagen vascular disease, (-) fibromyalgia, (-) multiple sclerosis, (-) muscular dystrophy, (-) RSD,(-) joint pain (-)swelling, (-) joint pain,swelling. Neurologic: (-) epilepsy, (-)seizures,(-) brain tumor,(-) TIA, (-)stroke, (-)headaches, (-)Parkinson disease,(-) memory loss, (-) LOC.   Cardiovascular: (-) Chest pain, (-) swelling in legs/feet, (-) SOB, (-) cramping in legs/feet with walking. Respiratory: (-) SOB, (-) Coughing, (-) night sweats. GI: (-) nausea, (-) vomiting, (-) diarrhea, (-) blood in stool, (-) gastric ulcer. Psychiatric: (-) Depression, (-) Anxiety, (-) bipolar disease, (-) Alzheimer's Disease  Allergic/Immunologic: (-) allergies latex, (-) allergies metal, (-) skin sensitivity. Hematlogic: (-) anemia, (-) blood transfusion, (-) DVT/PE, (-) Clotting disorders      Subjective:  _Temp 98 °F (36.7 °C)   Ht 5' (1.524 m)   Wt 110 lb (49.9 kg)   LMP 08/13/2012   BMI 21.48 kg/m²  Vital signs are stable. In general, patient is awake, alert and oriented X3, in no apparent distress. Examination of HENT reveals normocephalic, atraumatic. PERRLA/EOMI sclera are white. Conjunctivae are clear. TM's are intact. Pharynx is pink and moist.  Uvula and tongue are midline. Heart: Positive S1 and positive S2 with regular rate and rhythm. Lungs: Clear to auscultation bilaterally without rales, rhonchi or wheezes. Abdomen: soft, nontender. Positive bowel sounds. No organomegaly. No guarding or rigidity. Constitution:  Temp 98 °F (36.7 °C)   Ht 5' (1.524 m)   Wt 110 lb (49.9 kg)   LMP 08/13/2012   BMI 21.48 kg/m²     Psycihatric:  The patient is alert and oriented x 3, appears to be stated age and in no distress. Respiratory:  Respiratory effort is not labored. Patient is not gasping. Palpation of the chest reveals no tactile fremitus. Skin:  Upon inspection: the skin appears warm, dry and intact. There is not a previous scar over the affected area. There is not any cellulitis, lymphedema or cutaneous lesions noted in the lower extremities. Upon palpation there is no induration noted. Neurologic:  Motor exam of the upper extremities show: The reflexes in biceps/triceps/brachioradialis are equal and symmetric. Sensory exam C5-T1 are normal bilaterally. Cardiovascular: The vascular exam is normal and is well perfused to distal extremities. There are 2+ radial pulses bilaterally, and motor and sensation is intact to median, ulnar, and radial, musclocutaneus, and axillary nerve distribution and grossly symmetric bilaterally. There is cap refill noted less than two seconds in all digits. There is not edema of the bilateral upper extremities. There is not varicosities noted in the distal extremities. Lymph:  Upon palpation,  there is no lymphadenopathy noted in bilateral upper extremities. Musculoskeletal:  Gait: normal; examination of the nails and digits reveal no cyanosis or clubbing. Cervical Exam:  On physical exam, Paula Romberg is well-developed, well-nourished, oriented to person, place and time. her gait is normal.  On evaluation of hercervical spine, She has full range of motion of the cervical spine without pain. There is no cervical tenderness to palpation. Shoulder Exam:  On evaluation of her bilaterally upper extremities, her right shoulder has no deformity. There is tenderness upon palpation of the anterior and bicep. There is not evidence of scapular dyskinesis. There is not muscle atrophy in shoulder girdle. The range of motion for the Right Shoulder is 120/30/t10 and for the Left shoulder is 150/45/t8. Right shoulder Motor strength is 4/5 in the supraspinatus, 5/5 internal rotation and 4/5 in external rotation, and Left shoulder motor strength 5/5 in supraspinatus, 5/5 in internal rotation, 5/5 in external rotation. Right shoulder:  negative Impingement , negative Hart ,positive  Speeds,negativeApprehension ,negative Dick Load Shift, negative Arleth manuver, negative Cross arm test.     Left shoulder:    negative Impingement , negative Hart ,negative  Speeds,negative  Apprehension   ,negative Dick Load Shift, negative Arleth manuver, negative Cross arm test.     XRAY:   Impression    No evidence of shoulder fracture or dislocation. RECOMMENDATION:    None.       MRI:    Complete supraspinatus tendon tear extending to a near complete infraspinatus   tendon tear.  Dominant tendon retraction to the level of the glenoid. Moderate supraspinatus muscle atrophy. Mild-to-moderate distention of the subacromial subdeltoid bursa. Betha Staple is a   glenohumeral joint effusion. Radiographic findings reviewed with patient    Impression:   Encounter Diagnoses   Name Primary?  Traumatic complete tear of right rotator cuff, initial encounter Yes    Biceps tendonitis on right      Plan: Natural history and expected course discussed. Questions answered. Educational material distributed. Reduction in offending activity. Gentle ROM exercises   I had a lengthy discussion with the patient regarding their diagnosis. I explained treatment options including surgical vs non surgical treatment. I reviewed in detail the risks and benefits and outlined the procedure in detail with expected outcomes and possible complications. I also discussed non surgical treatment such as injections (CSI and visco supplementation), physical therapy, topical creams and NSAID's. They have elected for surgical management at this time. I discussed with patient that I may not be able to surgically repair the rotator cuff due to the amount of atrophy to supraspinatus muscle. Patient will undergo Right shoulder arthroscopy with rotator cuff repair, debridement and subacromial decompression 1/22/21  The risks and benefits were reviewed with the patient such as: arthorfibrosis shoulder, DVT, infection,  injuries to blood vessels and nerves, non relief of symptoms, recurrent tear, continued pain, worsening of symptoms. The patient was counseled at length about the risks of patricia Covid-19 during their perioperative period and any recovery window from their procedure. The patient was made aware that patricia Covid-19  may worsen their prognosis for recovering from their procedure  and lend to a higher morbidity and/or mortality risk.

## 2021-01-15 RX ORDER — TRAMADOL HYDROCHLORIDE 50 MG/1
50 TABLET ORAL EVERY 6 HOURS PRN
COMMUNITY
End: 2021-03-04

## 2021-01-18 ENCOUNTER — HOSPITAL ENCOUNTER (OUTPATIENT)
Age: 56
Discharge: HOME | End: 2021-01-20
Payer: COMMERCIAL

## 2021-01-18 PROCEDURE — U0003 INFECTIOUS AGENT DETECTION BY NUCLEIC ACID (DNA OR RNA); SEVERE ACUTE RESPIRATORY SYNDROME CORONAVIRUS 2 (SARS-COV-2) (CORONAVIRUS DISEASE [COVID-19]), AMPLIFIED PROBE TECHNIQUE, MAKING USE OF HIGH THROUGHPUT TECHNOLOGIES AS DESCRIBED BY CMS-2020-01-R: HCPCS

## 2021-01-19 LAB
SARS-COV-2: NOT DETECTED
SOURCE: NORMAL

## 2021-01-21 ENCOUNTER — ANESTHESIA EVENT (OUTPATIENT)
Dept: OPERATING ROOM | Age: 56
End: 2021-01-21
Payer: COMMERCIAL

## 2021-01-21 NOTE — ANESTHESIA PRE PROCEDURE
Department of Anesthesiology  Preprocedure Note       Name:  Marlene Castañeda   Age:  60 y.o.  :  1965                                          MRN:  [id number]         Date:  2021      Surgeon: Gabby Legerr): Shanti Ordaz DO    Procedure: Procedure(s):  RIGHT SHOULDER ARTHROSCOPY, SUBACROMIAL DECOMPRESSION, ROTATOR CUFF REPAIR AND DEBRIDEMENT (ARTHREX)    Medications prior to admission:   Prior to Admission medications    Medication Sig Start Date End Date Taking? Authorizing Provider   traMADol (ULTRAM) 50 MG tablet Take 50 mg by mouth every 6 hours as needed for Pain. Yes Historical Provider, MD   doxepin (SINEQUAN) 10 MG capsule TAKE 1 CAPSULE BY MOUTH TWO TIMES DAILY 10/15/20  Yes Suhail Duncan DO   ibuprofen (ADVIL;MOTRIN) 800 MG tablet Take 1 tablet by mouth every 6 hours as needed for Pain 20  Yes Tamie Nichols, APRN - CNP   Multiple Vitamins-Minerals (HAIR SKIN AND NAILS FORMULA PO) Take 1 tablet by mouth daily   Yes Historical Provider, MD       Current medications:    No current facility-administered medications for this encounter. Current Outpatient Medications   Medication Sig Dispense Refill    traMADol (ULTRAM) 50 MG tablet Take 50 mg by mouth every 6 hours as needed for Pain.  doxepin (SINEQUAN) 10 MG capsule TAKE 1 CAPSULE BY MOUTH TWO TIMES DAILY 180 capsule 2    ibuprofen (ADVIL;MOTRIN) 800 MG tablet Take 1 tablet by mouth every 6 hours as needed for Pain 21 tablet 0    Multiple Vitamins-Minerals (HAIR SKIN AND NAILS FORMULA PO) Take 1 tablet by mouth daily         Allergies:     Allergies   Allergen Reactions    Vicodin [Hydrocodone-Acetaminophen] Nausea And Vomiting       Problem List:    Patient Active Problem List   Diagnosis Code    Wrist pain M25.539    Distal radius fracture S52.509A    Degenerative arthritis of thumb M18.10    Arthritis of right hand M19.041    Arthritis of left hand M19.042       Past Medical History:        Diagnosis Date    Chronic idiopathic urticaria 02/2014    Hyperlipidemia     slight not on meds    Nausea & vomiting     OA (osteoarthritis) of finger     PONV (postoperative nausea and vomiting)     with anesthesia       Past Surgical History:        Procedure Laterality Date    CHOLECYSTECTOMY  10/2007    COLONOSCOPY  08/09/2011    DODIG    FRACTURE SURGERY  aug 2012    open reduction internal fixation left wrist    UPPER GASTROINTESTINAL ENDOSCOPY  08/09/2011       Social History:    Social History     Tobacco Use    Smoking status: Never Smoker    Smokeless tobacco: Never Used   Substance Use Topics    Alcohol use: Yes     Comment: rare                                Counseling given: Not Answered      Vital Signs (Current):   Vitals:    01/15/21 1422   Weight: 110 lb (49.9 kg)   Height: 5' (1.524 m)                                              BP Readings from Last 3 Encounters:   10/14/20 138/70   07/05/20 123/76   10/09/19 114/68       NPO Status:                                                                                 BMI:   Wt Readings from Last 3 Encounters:   01/11/21 110 lb (49.9 kg)   01/05/21 110 lb (49.9 kg)   11/17/20 110 lb (49.9 kg)     Body mass index is 21.48 kg/m². CBC:   Lab Results   Component Value Date    WBC 4.6 09/18/2020    RBC 3.70 09/18/2020    HGB 10.9 09/18/2020    HCT 34.1 09/18/2020    MCV 92.2 09/18/2020    RDW 12.6 09/18/2020     09/18/2020       CMP:   Lab Results   Component Value Date     09/18/2020    K 4.8 09/18/2020     09/18/2020    CO2 26 09/18/2020    BUN 17 09/18/2020    CREATININE 1.2 09/18/2020    GFRAA 56 09/18/2020    LABGLOM 46 09/18/2020    GLUCOSE 88 09/18/2020    PROT 7.3 09/18/2020    CALCIUM 9.9 09/18/2020    BILITOT 0.4 09/18/2020    ALKPHOS 93 09/18/2020    AST 33 09/18/2020    ALT 23 09/18/2020       POC Tests: No results for input(s): POCGLU, POCNA, POCK, POCCL, POCBUN, POCHEMO, POCHCT in the last 72 hours.     Coags: No results found for: PROTIME, INR, APTT    HCG (If Applicable):   Lab Results   Component Value Date    PREGTESTUR NEGATIVE 08/08/2011    PREGSERUM NEGATIVE 08/22/2012        ABGs: No results found for: PHART, PO2ART, JPX1IKR, KXD0NAU, BEART, X4MXGOIL     Type & Screen (If Applicable):  No results found for: LABABO, LABRH    Drug/Infectious Status (If Applicable):  No results found for: HIV, HEPCAB    COVID-19 Screening (If Applicable):   Lab Results   Component Value Date    COVID19 Not Detected 01/18/2021         Anesthesia Evaluation  Patient summary reviewed   history of anesthetic complications: PONV. Airway: Mallampati: II  TM distance: >3 FB   Neck ROM: full  Mouth opening: > = 3 FB Dental: normal exam         Pulmonary:Negative Pulmonary ROS breath sounds clear to auscultation                             Cardiovascular:Negative CV ROS    (+) hyperlipidemia        Rhythm: regular  Rate: normal                    Neuro/Psych:   Negative Neuro/Psych ROS              GI/Hepatic/Renal: Neg GI/Hepatic/Renal ROS            Endo/Other: Negative Endo/Other ROS   (+) : arthritis:., .                 Abdominal:       Abdomen: soft. Vascular:                                      Anesthesia Plan      general and regional     ASA 2       Induction: intravenous. Anesthetic plan and risks discussed with patient. Plan discussed with CRNA.             PAT Chart Review:  Chart reviewed per routine on January 21, 2021 at 8:57 AM by Osman Astorga MD.  (Final assessment and plan per day of surgery team.)      Osman Astorga MD   1/21/2021

## 2021-01-22 ENCOUNTER — HOSPITAL ENCOUNTER (OUTPATIENT)
Age: 56
Setting detail: OUTPATIENT SURGERY
Discharge: HOME | End: 2021-01-22
Attending: ORTHOPAEDIC SURGERY | Admitting: ORTHOPAEDIC SURGERY
Payer: COMMERCIAL

## 2021-01-22 ENCOUNTER — ANESTHESIA (OUTPATIENT)
Dept: OPERATING ROOM | Age: 56
End: 2021-01-22
Payer: COMMERCIAL

## 2021-01-22 VITALS
OXYGEN SATURATION: 95 % | TEMPERATURE: 94.8 F | RESPIRATION RATE: 18 BRPM | SYSTOLIC BLOOD PRESSURE: 146 MMHG | DIASTOLIC BLOOD PRESSURE: 89 MMHG

## 2021-01-22 VITALS
HEART RATE: 91 BPM | WEIGHT: 108 LBS | DIASTOLIC BLOOD PRESSURE: 78 MMHG | TEMPERATURE: 98.6 F | RESPIRATION RATE: 16 BRPM | HEIGHT: 60 IN | BODY MASS INDEX: 21.2 KG/M2 | SYSTOLIC BLOOD PRESSURE: 155 MMHG | OXYGEN SATURATION: 96 %

## 2021-01-22 DIAGNOSIS — M75.101 TEAR OF RIGHT ROTATOR CUFF, UNSPECIFIED TEAR EXTENT, UNSPECIFIED WHETHER TRAUMATIC: Primary | ICD-10-CM

## 2021-01-22 DIAGNOSIS — S46.011A TRAUMATIC COMPLETE TEAR OF RIGHT ROTATOR CUFF, INITIAL ENCOUNTER: ICD-10-CM

## 2021-01-22 PROBLEM — S43.431A TEAR OF RIGHT GLENOID LABRUM: Status: ACTIVE | Noted: 2021-01-22

## 2021-01-22 PROBLEM — M75.41 IMPINGEMENT SYNDROME OF RIGHT SHOULDER: Status: ACTIVE | Noted: 2021-01-22

## 2021-01-22 PROCEDURE — 29827 SHO ARTHRS SRG RT8TR CUF RPR: CPT | Performed by: ORTHOPAEDIC SURGERY

## 2021-01-22 PROCEDURE — 3700000001 HC ADD 15 MINUTES (ANESTHESIA): Performed by: ORTHOPAEDIC SURGERY

## 2021-01-22 PROCEDURE — 2580000003 HC RX 258: Performed by: ANESTHESIOLOGY

## 2021-01-22 PROCEDURE — 29826 SHO ARTHRS SRG DECOMPRESSION: CPT | Performed by: ORTHOPAEDIC SURGERY

## 2021-01-22 PROCEDURE — 6370000000 HC RX 637 (ALT 250 FOR IP): Performed by: ANESTHESIOLOGY

## 2021-01-22 PROCEDURE — 6360000002 HC RX W HCPCS: Performed by: NURSE ANESTHETIST, CERTIFIED REGISTERED

## 2021-01-22 PROCEDURE — 2709999900 HC NON-CHARGEABLE SUPPLY: Performed by: ORTHOPAEDIC SURGERY

## 2021-01-22 PROCEDURE — 7100000011 HC PHASE II RECOVERY - ADDTL 15 MIN: Performed by: ORTHOPAEDIC SURGERY

## 2021-01-22 PROCEDURE — 76942 ECHO GUIDE FOR BIOPSY: CPT | Performed by: ANESTHESIOLOGY

## 2021-01-22 PROCEDURE — 2720000010 HC SURG SUPPLY STERILE: Performed by: ORTHOPAEDIC SURGERY

## 2021-01-22 PROCEDURE — 7100000000 HC PACU RECOVERY - FIRST 15 MIN: Performed by: ORTHOPAEDIC SURGERY

## 2021-01-22 PROCEDURE — 6360000002 HC RX W HCPCS: Performed by: ANESTHESIOLOGY

## 2021-01-22 PROCEDURE — 7100000001 HC PACU RECOVERY - ADDTL 15 MIN: Performed by: ORTHOPAEDIC SURGERY

## 2021-01-22 PROCEDURE — 7100000010 HC PHASE II RECOVERY - FIRST 15 MIN: Performed by: ORTHOPAEDIC SURGERY

## 2021-01-22 PROCEDURE — 29828 SHO ARTHRS SRG BICP TENODSIS: CPT | Performed by: ORTHOPAEDIC SURGERY

## 2021-01-22 PROCEDURE — 2580000003 HC RX 258: Performed by: ORTHOPAEDIC SURGERY

## 2021-01-22 PROCEDURE — 6360000002 HC RX W HCPCS: Performed by: NURSE PRACTITIONER

## 2021-01-22 PROCEDURE — 3600000004 HC SURGERY LEVEL 4 BASE: Performed by: ORTHOPAEDIC SURGERY

## 2021-01-22 PROCEDURE — 6360000002 HC RX W HCPCS: Performed by: ORTHOPAEDIC SURGERY

## 2021-01-22 PROCEDURE — 2500000003 HC RX 250 WO HCPCS: Performed by: NURSE ANESTHETIST, CERTIFIED REGISTERED

## 2021-01-22 PROCEDURE — 3700000000 HC ANESTHESIA ATTENDED CARE: Performed by: ORTHOPAEDIC SURGERY

## 2021-01-22 PROCEDURE — 29823 SHO ARTHRS SRG XTNSV DBRDMT: CPT | Performed by: ORTHOPAEDIC SURGERY

## 2021-01-22 PROCEDURE — 3600000014 HC SURGERY LEVEL 4 ADDTL 15MIN: Performed by: ORTHOPAEDIC SURGERY

## 2021-01-22 PROCEDURE — C1713 ANCHOR/SCREW BN/BN,TIS/BN: HCPCS | Performed by: ORTHOPAEDIC SURGERY

## 2021-01-22 DEVICE — ANCHOR SUT L19.1MM DIA4.75MM BIOCOMPOSITE FULL THRD: Type: IMPLANTABLE DEVICE | Site: SHOULDER | Status: FUNCTIONAL

## 2021-01-22 DEVICE — ANCHOR SUT L24.5MM DIA4.75MM BIOCOMPOSITE SELF PUNCHING: Type: IMPLANTABLE DEVICE | Site: SHOULDER | Status: FUNCTIONAL

## 2021-01-22 DEVICE — ANCHOR SUTURE 4.75X19.1 MM TIG TAPE LOOP WHT BLK PUSHLOCK: Type: IMPLANTABLE DEVICE | Site: SHOULDER | Status: FUNCTIONAL

## 2021-01-22 RX ORDER — OXYCODONE AND ACETAMINOPHEN 7.5; 325 MG/1; MG/1
1 TABLET ORAL EVERY 6 HOURS PRN
Qty: 28 TABLET | Refills: 0 | Status: SHIPPED | OUTPATIENT
Start: 2021-01-22 | End: 2021-01-29

## 2021-01-22 RX ORDER — ROCURONIUM BROMIDE 10 MG/ML
INJECTION, SOLUTION INTRAVENOUS PRN
Status: DISCONTINUED | OUTPATIENT
Start: 2021-01-22 | End: 2021-01-22 | Stop reason: SDUPTHER

## 2021-01-22 RX ORDER — CEFAZOLIN SODIUM 2 G/50ML
2 SOLUTION INTRAVENOUS ONCE
Status: COMPLETED | OUTPATIENT
Start: 2021-01-22 | End: 2021-01-22

## 2021-01-22 RX ORDER — KETOROLAC TROMETHAMINE 30 MG/ML
INJECTION, SOLUTION INTRAMUSCULAR; INTRAVENOUS PRN
Status: DISCONTINUED | OUTPATIENT
Start: 2021-01-22 | End: 2021-01-22 | Stop reason: SDUPTHER

## 2021-01-22 RX ORDER — OXYCODONE HYDROCHLORIDE AND ACETAMINOPHEN 5; 325 MG/1; MG/1
TABLET ORAL
Status: DISCONTINUED
Start: 2021-01-22 | End: 2021-01-22 | Stop reason: HOSPADM

## 2021-01-22 RX ORDER — DIPHENHYDRAMINE HYDROCHLORIDE 50 MG/ML
INJECTION INTRAMUSCULAR; INTRAVENOUS PRN
Status: DISCONTINUED | OUTPATIENT
Start: 2021-01-22 | End: 2021-01-22 | Stop reason: SDUPTHER

## 2021-01-22 RX ORDER — PROPOFOL 10 MG/ML
INJECTION, EMULSION INTRAVENOUS PRN
Status: DISCONTINUED | OUTPATIENT
Start: 2021-01-22 | End: 2021-01-22 | Stop reason: SDUPTHER

## 2021-01-22 RX ORDER — SODIUM CHLORIDE 0.9 % (FLUSH) 0.9 %
10 SYRINGE (ML) INJECTION PRN
Status: DISCONTINUED | OUTPATIENT
Start: 2021-01-22 | End: 2021-01-22 | Stop reason: HOSPADM

## 2021-01-22 RX ORDER — ROPIVACAINE HYDROCHLORIDE 5 MG/ML
INJECTION, SOLUTION EPIDURAL; INFILTRATION; PERINEURAL
Status: COMPLETED | OUTPATIENT
Start: 2021-01-22 | End: 2021-01-22

## 2021-01-22 RX ORDER — DEXAMETHASONE SODIUM PHOSPHATE 10 MG/ML
INJECTION, SOLUTION INTRAMUSCULAR; INTRAVENOUS PRN
Status: DISCONTINUED | OUTPATIENT
Start: 2021-01-22 | End: 2021-01-22 | Stop reason: SDUPTHER

## 2021-01-22 RX ORDER — SODIUM CHLORIDE 0.9 % (FLUSH) 0.9 %
10 SYRINGE (ML) INJECTION EVERY 12 HOURS SCHEDULED
Status: DISCONTINUED | OUTPATIENT
Start: 2021-01-22 | End: 2021-01-22 | Stop reason: HOSPADM

## 2021-01-22 RX ORDER — ROPIVACAINE HYDROCHLORIDE 5 MG/ML
INJECTION, SOLUTION EPIDURAL; INFILTRATION; PERINEURAL PRN
Status: DISCONTINUED | OUTPATIENT
Start: 2021-01-22 | End: 2021-01-22

## 2021-01-22 RX ORDER — MIDAZOLAM HYDROCHLORIDE 1 MG/ML
INJECTION INTRAMUSCULAR; INTRAVENOUS PRN
Status: DISCONTINUED | OUTPATIENT
Start: 2021-01-22 | End: 2021-01-22 | Stop reason: SDUPTHER

## 2021-01-22 RX ORDER — SCOLOPAMINE TRANSDERMAL SYSTEM 1 MG/1
1 PATCH, EXTENDED RELEASE TRANSDERMAL
Status: DISCONTINUED | OUTPATIENT
Start: 2021-01-22 | End: 2021-01-22 | Stop reason: HOSPADM

## 2021-01-22 RX ORDER — LIDOCAINE HYDROCHLORIDE 20 MG/ML
INJECTION, SOLUTION EPIDURAL; INFILTRATION; INTRACAUDAL; PERINEURAL PRN
Status: DISCONTINUED | OUTPATIENT
Start: 2021-01-22 | End: 2021-01-22 | Stop reason: SDUPTHER

## 2021-01-22 RX ORDER — METOCLOPRAMIDE 10 MG/1
10 TABLET ORAL ONCE
Status: COMPLETED | OUTPATIENT
Start: 2021-01-22 | End: 2021-01-22

## 2021-01-22 RX ORDER — OXYCODONE HYDROCHLORIDE AND ACETAMINOPHEN 5; 325 MG/1; MG/1
1 TABLET ORAL PRN
Status: COMPLETED | OUTPATIENT
Start: 2021-01-22 | End: 2021-01-22

## 2021-01-22 RX ORDER — FAMOTIDINE 20 MG/1
20 TABLET, FILM COATED ORAL ONCE
Status: COMPLETED | OUTPATIENT
Start: 2021-01-22 | End: 2021-01-22

## 2021-01-22 RX ORDER — ONDANSETRON 2 MG/ML
INJECTION INTRAMUSCULAR; INTRAVENOUS PRN
Status: DISCONTINUED | OUTPATIENT
Start: 2021-01-22 | End: 2021-01-22 | Stop reason: SDUPTHER

## 2021-01-22 RX ORDER — CEPHALEXIN 500 MG/1
500 CAPSULE ORAL 3 TIMES DAILY
Qty: 10 CAPSULE | Refills: 0 | Status: SHIPPED | OUTPATIENT
Start: 2021-01-22 | End: 2021-01-26

## 2021-01-22 RX ORDER — FENTANYL CITRATE 50 UG/ML
INJECTION, SOLUTION INTRAMUSCULAR; INTRAVENOUS PRN
Status: DISCONTINUED | OUTPATIENT
Start: 2021-01-22 | End: 2021-01-22 | Stop reason: SDUPTHER

## 2021-01-22 RX ORDER — SODIUM CHLORIDE, SODIUM LACTATE, POTASSIUM CHLORIDE, CALCIUM CHLORIDE 600; 310; 30; 20 MG/100ML; MG/100ML; MG/100ML; MG/100ML
INJECTION, SOLUTION INTRAVENOUS CONTINUOUS
Status: DISCONTINUED | OUTPATIENT
Start: 2021-01-22 | End: 2021-01-22 | Stop reason: HOSPADM

## 2021-01-22 RX ORDER — NEOSTIGMINE METHYLSULFATE 1 MG/ML
INJECTION, SOLUTION INTRAVENOUS PRN
Status: DISCONTINUED | OUTPATIENT
Start: 2021-01-22 | End: 2021-01-22 | Stop reason: SDUPTHER

## 2021-01-22 RX ORDER — GLYCOPYRROLATE 1 MG/5 ML
SYRINGE (ML) INTRAVENOUS PRN
Status: DISCONTINUED | OUTPATIENT
Start: 2021-01-22 | End: 2021-01-22 | Stop reason: SDUPTHER

## 2021-01-22 RX ADMIN — MIDAZOLAM 2 MG: 1 INJECTION INTRAMUSCULAR; INTRAVENOUS at 08:52

## 2021-01-22 RX ADMIN — FAMOTIDINE 20 MG: 20 TABLET ORAL at 07:53

## 2021-01-22 RX ADMIN — Medication 3 MG: at 10:54

## 2021-01-22 RX ADMIN — LIDOCAINE HYDROCHLORIDE 25 MG: 20 INJECTION, SOLUTION EPIDURAL; INFILTRATION; INTRACAUDAL; PERINEURAL at 09:11

## 2021-01-22 RX ADMIN — OXYCODONE HYDROCHLORIDE AND ACETAMINOPHEN 1 TABLET: 5; 325 TABLET ORAL at 13:04

## 2021-01-22 RX ADMIN — ROCURONIUM BROMIDE 10 MG: 10 SOLUTION INTRAVENOUS at 10:01

## 2021-01-22 RX ADMIN — FENTANYL CITRATE 50 MCG: 50 INJECTION, SOLUTION INTRAMUSCULAR; INTRAVENOUS at 10:16

## 2021-01-22 RX ADMIN — SODIUM CHLORIDE, POTASSIUM CHLORIDE, SODIUM LACTATE AND CALCIUM CHLORIDE: 600; 310; 30; 20 INJECTION, SOLUTION INTRAVENOUS at 08:07

## 2021-01-22 RX ADMIN — METOCLOPRAMIDE 10 MG: 10 TABLET ORAL at 07:53

## 2021-01-22 RX ADMIN — ROCURONIUM BROMIDE 30 MG: 10 SOLUTION INTRAVENOUS at 09:11

## 2021-01-22 RX ADMIN — KETOROLAC TROMETHAMINE 30 MG: 30 INJECTION, SOLUTION INTRAMUSCULAR; INTRAVENOUS at 10:49

## 2021-01-22 RX ADMIN — ONDANSETRON 4 MG: 2 INJECTION INTRAMUSCULAR; INTRAVENOUS at 10:40

## 2021-01-22 RX ADMIN — DEXAMETHASONE SODIUM PHOSPHATE 10 MG: 10 INJECTION, SOLUTION INTRAMUSCULAR; INTRAVENOUS at 09:42

## 2021-01-22 RX ADMIN — PROPOFOL 150 MG: 10 INJECTION, EMULSION INTRAVENOUS at 09:11

## 2021-01-22 RX ADMIN — FENTANYL CITRATE 50 MCG: 50 INJECTION, SOLUTION INTRAMUSCULAR; INTRAVENOUS at 10:01

## 2021-01-22 RX ADMIN — ROPIVACAINE HYDROCHLORIDE 25 ML: 5 INJECTION, SOLUTION EPIDURAL; INFILTRATION; PERINEURAL at 10:00

## 2021-01-22 RX ADMIN — CEFAZOLIN SODIUM 2000 MG: 2 SOLUTION INTRAVENOUS at 09:30

## 2021-01-22 RX ADMIN — DIPHENHYDRAMINE HYDROCHLORIDE 12.5 MG: 50 INJECTION, SOLUTION INTRAMUSCULAR; INTRAVENOUS at 10:30

## 2021-01-22 RX ADMIN — FENTANYL CITRATE 50 MCG: 50 INJECTION, SOLUTION INTRAMUSCULAR; INTRAVENOUS at 09:00

## 2021-01-22 RX ADMIN — FENTANYL CITRATE 50 MCG: 50 INJECTION, SOLUTION INTRAMUSCULAR; INTRAVENOUS at 09:26

## 2021-01-22 RX ADMIN — SODIUM CHLORIDE, POTASSIUM CHLORIDE, SODIUM LACTATE AND CALCIUM CHLORIDE: 600; 310; 30; 20 INJECTION, SOLUTION INTRAVENOUS at 10:34

## 2021-01-22 RX ADMIN — Medication 0.6 MG: at 10:54

## 2021-01-22 RX ADMIN — FENTANYL CITRATE 50 MCG: 50 INJECTION, SOLUTION INTRAMUSCULAR; INTRAVENOUS at 08:55

## 2021-01-22 ASSESSMENT — PULMONARY FUNCTION TESTS
PIF_VALUE: 13
PIF_VALUE: 0
PIF_VALUE: 12
PIF_VALUE: 14
PIF_VALUE: 12
PIF_VALUE: 15
PIF_VALUE: 12
PIF_VALUE: 13
PIF_VALUE: 0
PIF_VALUE: 13
PIF_VALUE: 9
PIF_VALUE: 14
PIF_VALUE: 0
PIF_VALUE: 17
PIF_VALUE: 14
PIF_VALUE: 12
PIF_VALUE: 16
PIF_VALUE: 14
PIF_VALUE: 13
PIF_VALUE: 15
PIF_VALUE: 16
PIF_VALUE: 16
PIF_VALUE: 13
PIF_VALUE: 18
PIF_VALUE: 11
PIF_VALUE: 0
PIF_VALUE: 14
PIF_VALUE: 16
PIF_VALUE: 14
PIF_VALUE: 12
PIF_VALUE: 14
PIF_VALUE: 14
PIF_VALUE: 0
PIF_VALUE: 14
PIF_VALUE: 13
PIF_VALUE: 16
PIF_VALUE: 13
PIF_VALUE: 14
PIF_VALUE: 15
PIF_VALUE: 14
PIF_VALUE: 0
PIF_VALUE: 0
PIF_VALUE: 12
PIF_VALUE: 15
PIF_VALUE: 17
PIF_VALUE: 12
PIF_VALUE: 14
PIF_VALUE: 13
PIF_VALUE: 1
PIF_VALUE: 0
PIF_VALUE: 12
PIF_VALUE: 14
PIF_VALUE: 16
PIF_VALUE: 16
PIF_VALUE: 0
PIF_VALUE: 14
PIF_VALUE: 1
PIF_VALUE: 12
PIF_VALUE: 13
PIF_VALUE: 14

## 2021-01-22 ASSESSMENT — PAIN SCALES - GENERAL: PAINLEVEL_OUTOF10: 6

## 2021-01-22 ASSESSMENT — PAIN DESCRIPTION - DESCRIPTORS: DESCRIPTORS: ACHING;BURNING

## 2021-01-22 ASSESSMENT — PAIN - FUNCTIONAL ASSESSMENT: PAIN_FUNCTIONAL_ASSESSMENT: PREVENTS OR INTERFERES SOME ACTIVE ACTIVITIES AND ADLS

## 2021-01-22 NOTE — ANESTHESIA PROCEDURE NOTES
Peripheral Block    Patient location during procedure: OR  Start time: 1/22/2021 8:20 AM  End time: 1/22/2021 8:30 AM  Staffing  Performed: anesthesiologist   Anesthesiologist: Gilford End, MD  Preanesthetic Checklist  Completed: patient identified, IV checked, site marked, risks and benefits discussed, surgical consent, monitors and equipment checked, pre-op evaluation, timeout performed, anesthesia consent given, oxygen available and patient being monitored  Peripheral Block  Patient position: supine  Prep: ChloraPrep  Patient monitoring: cardiac monitor, continuous pulse ox, frequent blood pressure checks and IV access  Block type: Brachial plexus  Laterality: right  Injection technique: single-shot  Guidance: nerve stimulator and ultrasound guided  Interscalene  Provider prep: mask and sterile gloves  Needle  Needle type: combined needle/nerve stimulator   Needle gauge: 21 G  Needle length: 8 cm  Needle localization: nerve stimulator and ultrasound guidance  Test dose: negative  Assessment  Injection assessment: negative aspiration for heme and local visualized surrounding nerve on ultrasound  Paresthesia pain: none  Slow fractionated injection: yes  Hemodynamics: stable  Medications Administered  Ropivacaine (NAROPIN) injection 0.5%, 25 mL  Reason for block: post-op pain management and at surgeon's request

## 2021-01-22 NOTE — ANESTHESIA POSTPROCEDURE EVALUATION
Department of Anesthesiology  Postprocedure Note    Patient: Selena Ramirez  MRN: [id number]  YOB: 1965  Date of evaluation: 1/22/2021  Time:  12:39 PM     Procedure Summary     Date: 01/22/21 Room / Location: [address] [place] [address] 02 / [address] [facility]    Anesthesia Start: 6186 Anesthesia Stop: 3361    Procedure: RIGHT SHOULDER ARTHROSCOPY, SUBACROMIAL DECOMPRESSION, ROTATOR CUFF REPAIR AND DEBRIDEMENT (Ninoska Beverly) (Right ) Diagnosis: (RIGHT SHOULDER ROTATOR CUFF TEAR, BICEPS TENODESIS)    Surgeons: Kelly Singer DO Responsible Provider: Misti Reinoso MD    Anesthesia Type: general, regional ASA Status: 2          Anesthesia Type: general, regional    Paul Phase I: Paul Score: 9    Paul Phase II: Paul Score: 9    Last vitals: Reviewed and per EMR flowsheets.        Anesthesia Post Evaluation    Patient location during evaluation: PACU  Patient participation: complete - patient participated  Level of consciousness: awake  Pain score: 2  Airway patency: patent  Nausea & Vomiting: no nausea  Complications: no  Cardiovascular status: blood pressure returned to baseline  Respiratory status: acceptable  Hydration status: euvolemic

## 2021-01-22 NOTE — H&P
Updated H&P    Chief Complaint   Patient presents with    Shoulder Pain       right shoulder MRI Results         Doyle Severe is a 60y.o. year old   female who is seen today  for evaluation of right shoulder pain. She had bicep tendon injection 3 weeks ago with relief to bicep tendon region however she is unable to lift her arm about her head now and has experienced weakness and new lateral shoulder pain. She is here today for mri results.              Chief Complaint   Patient presents with    Shoulder Pain       right shoulder MRI Results      Past Medical History        Past Medical History:   Diagnosis Date    Chronic idiopathic urticaria 02/2014    Hyperlipidemia       slight not on meds    Nausea & vomiting      OA (osteoarthritis) of finger           Past Surgical History         Past Surgical History:   Procedure Laterality Date    CHOLECYSTECTOMY   10/2007    COLONOSCOPY   08/09/2011     DODIG    FRACTURE SURGERY   aug 2012     open reduction internal fixation left wrist    UPPER GASTROINTESTINAL ENDOSCOPY   08/09/2011           Current Medication      Current Outpatient Medications:     traMADol (ULTRAM) 50 MG tablet, Take 1 tablet by mouth every 6 hours as needed for Pain for up to 7 days. Intended supply: 7 days.  Take lowest dose possible to manage pain, Disp: 28 tablet, Rfl: 0    doxepin (SINEQUAN) 10 MG capsule, TAKE 1 CAPSULE BY MOUTH TWO TIMES DAILY, Disp: 180 capsule, Rfl: 2    ibuprofen (ADVIL;MOTRIN) 800 MG tablet, Take 1 tablet by mouth every 6 hours as needed for Pain, Disp: 21 tablet, Rfl: 0    Multiple Vitamins-Minerals (HAIR SKIN AND NAILS FORMULA PO), Take 1 tablet by mouth daily, Disp: , Rfl:           Allergies   Allergen Reactions    Vicodin [Hydrocodone-Acetaminophen] Nausea And Vomiting      Social History               Socioeconomic History    Marital status:        Spouse name: Radha Turner    Number of children: 2    Years of education: BSN    (-) epilepsy, (-)seizures,(-) brain tumor,(-) TIA, (-)stroke, (-)headaches, (-)Parkinson disease,(-) memory loss, (-) LOC. Cardiovascular: (-) Chest pain, (-) swelling in legs/feet, (-) SOB, (-) cramping in legs/feet with walking. Respiratory: (-) SOB, (-) Coughing, (-) night sweats. GI: (-) nausea, (-) vomiting, (-) diarrhea, (-) blood in stool, (-) gastric ulcer. Psychiatric: (-) Depression, (-) Anxiety, (-) bipolar disease, (-) Alzheimer's Disease  Allergic/Immunologic: (-) allergies latex, (-) allergies metal, (-) skin sensitivity. Hematlogic: (-) anemia, (-) blood transfusion, (-) DVT/PE, (-) Clotting disorders        /66   Pulse 88   Temp 97.2 °F (36.2 °C) (Temporal)   Resp 16   Ht 5' (1.524 m)   Wt 108 lb (49 kg)   LMP 08/13/2012   SpO2 97%   BMI 21.09 kg/m²    Vital signs are stable.  In general, patient is awake, alert and oriented X3, in no apparent distress.  Examination of HENT reveals normocephalic, atraumatic.  PERRLA/EOMI sclera are white.  Conjunctivae are clear.  TM's are intact.  Pharynx is pink and moist.  Uvula and tongue are midline.  Heart: Positive S1 and positive S2 with regular rate and rhythm.  Lungs: Clear to auscultation bilaterally without rales, rhonchi or wheezes.  Abdomen: soft, nontender.  Positive bowel sounds.  No organomegaly.  No guarding or rigidity.        Constitution:         Psycihatric:  The patient is alert and oriented x 3, appears to be stated age and in no distress.       Respiratory:  Respiratory effort is not labored. Patient is not gasping. Palpation of the chest reveals no tactile fremitus.     Skin:  Upon inspection: the skin appears warm, dry and intact. There is not a previous scar over the affected area. There is not any cellulitis, lymphedema or cutaneous lesions noted in the lower extremities. Upon palpation there is no induration noted.       Neurologic:  Motor exam of the upper extremities show:  The reflexes in biceps/triceps/brachioradialis are equal and symmetric. Sensory exam C5-T1 are normal bilaterally.         Cardiovascular: The vascular exam is normal and is well perfused to distal extremities. There are 2+ radial pulses bilaterally, and motor and sensation is intact to median, ulnar, and radial, musclocutaneus, and axillary nerve distribution and grossly symmetric bilaterally. There is cap refill noted less than two seconds in all digits. There is not edema of the bilateral upper extremities. There is not varicosities noted in the distal extremities.       Lymph:  Upon palpation,  there is no lymphadenopathy noted in bilateral upper extremities.       Musculoskeletal:  Gait: normal; examination of the nails and digits reveal no cyanosis or clubbing.        Cervical Exam:  On physical exam, Flori Horan is well-developed, well-nourished, oriented to person, place and time. her gait is normal.  On evaluation of hercervical spine, She has full range of motion of the cervical spine without pain. There is no cervical tenderness to palpation.      Shoulder Exam:  On evaluation of her bilaterally upper extremities, her right shoulder has no deformity. There is tenderness upon palpation of the anterior and bicep. There is not evidence of scapular dyskinesis. There is not muscle atrophy in shoulder girdle. The range of motion for the Right Shoulder is 120/30/t10 and for the Left shoulder is 150/45/t8.   Right shoulder Motor strength is 4/5 in the supraspinatus, 5/5 internal rotation and 4/5 in external rotation, and Left shoulder motor strength 5/5 in supraspinatus, 5/5 in internal rotation, 5/5 in external rotation.         Right shoulder:  negative Impingement , negative Ahrt ,positive  Speeds,negativeApprehension ,negative Dick Load Shift, negative Arleth manuver, negative Cross arm test.      Left shoulder:     negative Impingement , negative Hart ,negative  Speeds,negative  Apprehension   ,negative Dick Load Shift, negative Arleth manuver, negative Cross arm test.      XRAY:   Impression    No evidence of shoulder fracture or dislocation. RECOMMENDATION:    None.       MRI:    Complete supraspinatus tendon tear extending to a near complete infraspinatus   tendon tear.  Dominant tendon retraction to the level of the glenoid. Moderate supraspinatus muscle atrophy. Mild-to-moderate distention of the subacromial subdeltoid bursa. Kenyatta Louie is a   glenohumeral joint effusion.      Radiographic findings reviewed with patient     Impression:        Encounter Diagnoses   Name Primary?  Traumatic complete tear of right rotator cuff, initial encounter Yes    Biceps tendonitis on right        Plan: Natural history and expected course discussed. Questions answered. Educational material distributed. Reduction in offending activity. Gentle ROM exercises   I had a lengthy discussion with the patient regarding their diagnosis. I explained treatment options including surgical vs non surgical treatment. I reviewed in detail the risks and benefits and outlined the procedure in detail with expected outcomes and possible complications. I also discussed non surgical treatment such as injections (CSI and visco supplementation), physical therapy, topical creams and NSAID's. They have elected for surgical management at this time. I discussed with patient that I may not be able to surgically repair the rotator cuff due to the amount of atrophy to supraspinatus muscle. Patient will undergo Right shoulder arthroscopy with rotator cuff repair, debridement and subacromial decompression 1/22/21  The risks and benefits were reviewed with the patient such as: arthorfibrosis shoulder, DVT, infection,  injuries to blood vessels and nerves, non relief of symptoms, recurrent tear, continued pain, worsening of symptoms.     The patient was counseled at length about the risks of patricia Covid-19 during their perioperative period and any recovery window from their procedure.  The patient was made aware that patricia Covid-19  may worsen their prognosis for recovering from their procedure  and lend to a higher morbidity and/or mortality risk.  All material risks, benefits, and reasonable alternatives including postponing the procedure were discussed. The patient does wish to proceed with the procedure at this time.   t.

## 2021-01-22 NOTE — OP NOTE
Operative Note      Patient: Abel Talley  YOB: 1965  MRN: [id number]    Date of Procedure: 1/22/2021    Pre-Op Diagnosis: RIGHT SHOULDER ROTATOR CUFF TEAR, BICEPS TENODESIS    Post-Op Diagnosis: Same       Procedure(s):  RIGHT SHOULDER ARTHROSCOPY, SUBACROMIAL DECOMPRESSION, ROTATOR CUFF REPAIR AND DEBRIDEMENT ([address])    Surgeon(s): Lyndsey Dickson DO    Assistant:   Resident: Osmani Hay DO; Nevaeh Schwarz DO    Anesthesia: General    Estimated Blood Loss (mL): Minimal    Complications: None    Specimens:   * No specimens in log *    Implants:  * No implants in log *      Drains: * No LDAs found *    Findings: as above    Detailed Description of Procedure:   below    PREOPERATIVE DIAGNOSES: (1) right shoulder rotator cuff tear. (2)   Subacromial impingement syndrome . (3) labral tear (4) biceps tear  POSTOPERATIVE DIAGNOSES:as above  OPERATION: (1)right shoulder arthroscopy with arthroscopic rotator cuff   repair. (2) Subacromial arch decompression. (3) labral debridement (4) biceps tenodesis  ANESTHESIA: General   Surgeon: tiffany   Assistant:general  ESTIMATED BLOOD LOSS: Minimal  COMPLICATIONS: None. OPERATIVE IMPLANTS:6 swivel lock anchors    Brief Hospital Course: Abel Talley is a patient known to Al Perez DO's practice with persistent complaints of shoulder pain. shoulder pain has failed to be relieved by non-operative conservative measures, and has began affecting daily activities of living. After examination of the patient, review of the radiologic studies, and appropriate pre-operative risk assessment, Al Perez DO recommended shoulder arthroscopy, which the patient was agreeable towards. Operative Procedure:   I positioned the patient in the lateral decubitus position on a   beanbag, hung 10 pounds traction from the  arm, prepped and draped the   arm in sterile fashion. I outlined an incision along the posterior, lateral, and anterior   acromion.   I made an incision in the posterior side   of the shoulder, placed a blunt trocar within the glenohumeral joint and   carried out a diagnostic arthroscopy. Patient had a normal  glenohumeral   surface, glenoid and humeral head were in good. Patient did have a tear involving the biceps along its course  , and did have a labral tear. The labrum and biceps were debrided. The biceps was then felt to be un amenable to debridement and a pain generator. Thus I choose to perform a tenodesis. The biceps was then tagged and released form the glenoid. There was evidence of a full-thickness tear   from the undersurface looking up into the subacromial space. The tendon was retracted past the level of the glenoid. There was not a partial thickness rotator cuff tear. I did debride the rotator cuff tendon. I then went to the subacromial space, I completed a complete subacromial   bursectomy removing all bursal tissue from the subacromial space. Using an [employer]   electrode, I outlined the acromial borders. I then carried out an anterior inferior   Acromioplasty, using a 5.0 barrel ana maria, smoothing the hooked acromion to a flat type 1 contour. I then prepared the footprint of the greater tuberosity for implantation of the tendon down to nice bleeding bone. Tendon was debrided and cleaned up. The cuff tear measured about 6 cm in dimension and was full-thickness. I then choose to peform a double row repair. The cuff had moderate to severe atrophy, however the tendon was mobile enough to reach the greater tuberosity. I then went to repair the rotator cuff, I placed 3 medial row anchors at the articular margin. The anterior medial anchor was used to tenodese the biceps. The biceps stitich was incorporated into the bottom of the anchor. Using an [employer] suture passer, I passed stitches within the cuff   Tear approximately 17 mm from edge of tendon.    I grabbed the stitch out through the superior lateral portal where I made the anchor portal trough. I then cut our fused stitches, and placed 3 lateral row anchor lateral to the greater tuberosity. This was impacted into position. The cuff was reapproximated to its bed, had great fixation on   the cuff by pulling tension on the stitches and screwed the anchor into position. Thus reapproximating the tendon to its  bed. The cuff was nicely approximated to its bed and had no abnormalities. I did thoroughly irrigate the wound upon closure, I checked the cuff for stability using a hook probe. The tendon repair was snug and well approximated to its footprint. I then removed all fluid from the shoulder joint and closed the incision with 4-0 Prolene in a horizontal mattress-type fashion. Sterile dressing was placed on the wound. Patient placed in a splint. The Patient tolerated the procedure well, woke up in the   recovery room without difficulty.        Electronically signed by Pato Fofana DO on 1/22/2021 at 9:03 AM

## 2021-01-27 ENCOUNTER — TELEPHONE (OUTPATIENT)
Dept: ORTHOPEDIC SURGERY | Age: 56
End: 2021-01-27

## 2021-01-27 RX ORDER — ONDANSETRON 4 MG/1
4 TABLET, ORALLY DISINTEGRATING ORAL EVERY 8 HOURS PRN
Qty: 21 TABLET | Refills: 0 | Status: SHIPPED
Start: 2021-01-27 | End: 2021-03-22

## 2021-01-27 NOTE — TELEPHONE ENCOUNTER
HAD SURGERY ON 1/22/21 FOR R SHOULDER ARTHROSCOPY AND IS HAVING NAUSEA STILL CAN YOU CALL IN SOMETHING FOR THE NAUSEA.     SHE IS USING [facility] IN [place]

## 2021-02-03 DIAGNOSIS — S46.011A TRAUMATIC COMPLETE TEAR OF RIGHT ROTATOR CUFF, INITIAL ENCOUNTER: Primary | ICD-10-CM

## 2021-02-04 ENCOUNTER — OFFICE VISIT (OUTPATIENT)
Dept: ORTHOPEDIC SURGERY | Age: 56
End: 2021-02-04

## 2021-02-04 VITALS — WEIGHT: 108 LBS | BODY MASS INDEX: 21.2 KG/M2 | HEIGHT: 60 IN

## 2021-02-04 DIAGNOSIS — S46.011A TRAUMATIC COMPLETE TEAR OF RIGHT ROTATOR CUFF, INITIAL ENCOUNTER: Primary | ICD-10-CM

## 2021-02-04 DIAGNOSIS — M75.21 BICEPS TENDONITIS ON RIGHT: ICD-10-CM

## 2021-02-04 DIAGNOSIS — S43.431A TEAR OF RIGHT GLENOID LABRUM, INITIAL ENCOUNTER: ICD-10-CM

## 2021-02-04 PROCEDURE — 99024 POSTOP FOLLOW-UP VISIT: CPT | Performed by: ORTHOPAEDIC SURGERY

## 2021-02-04 RX ORDER — HYDROCODONE BITARTRATE AND ACETAMINOPHEN 7.5; 325 MG/1; MG/1
1 TABLET ORAL EVERY 6 HOURS PRN
Qty: 28 TABLET | Refills: 0 | Status: SHIPPED
Start: 2021-02-04 | End: 2021-03-05 | Stop reason: SDUPTHER

## 2021-02-04 NOTE — PROGRESS NOTES
Abel Talley is here for follow-up after right shoulder arthroscopy. Findings at surgery: Right shoulder rotator cuff tear, biceps tenodesis. .   Pain is controlled with current analgesics. Medication(s) being used: percocet. The patient denies fever, wound drainage, increasing redness, pus, increasing pain, increasing swelling. Post op problems reported: none. She is ambulating with sling. Physical exam:  The wounds are clean, dry, no drainage. She has intact motor and sensory functions, grossly intact in the median, ulnar, radial, musculocutaneous, and axillary nerve distributions. She   has bounding pulses in the wrist.  Capillary refill is less than 2 seconds in all digits. Surgical pictures from the surgery were reveiwed with the patient     Impression:   Encounter Diagnoses   Name Primary?  Traumatic complete tear of right rotator cuff, initial encounter Yes    Biceps tendonitis on right     Tear of right glenoid labrum, initial encounter          Plan:    I will remove the surgical sutures. The patient will now D/C the abduction pillow. She  will begin range of motion at the elbow, wrist and hand. She will continue to use analgesics to control the pain and will continue with sling immobilization. I will see them back in 4 weeks for repeat evaluation.   Remain off work 2 weeks

## 2021-03-04 ENCOUNTER — OFFICE VISIT (OUTPATIENT)
Dept: ORTHOPEDIC SURGERY | Age: 56
End: 2021-03-04

## 2021-03-04 VITALS — HEIGHT: 61 IN | BODY MASS INDEX: 20.39 KG/M2 | WEIGHT: 108 LBS

## 2021-03-04 DIAGNOSIS — S43.431A TEAR OF RIGHT GLENOID LABRUM, INITIAL ENCOUNTER: ICD-10-CM

## 2021-03-04 DIAGNOSIS — S46.011A TRAUMATIC COMPLETE TEAR OF RIGHT ROTATOR CUFF, INITIAL ENCOUNTER: Primary | ICD-10-CM

## 2021-03-04 DIAGNOSIS — M75.21 BICEPS TENDONITIS ON RIGHT: ICD-10-CM

## 2021-03-04 PROCEDURE — 99024 POSTOP FOLLOW-UP VISIT: CPT | Performed by: NURSE PRACTITIONER

## 2021-03-04 NOTE — PROGRESS NOTES
Janes Okeefe is here for post op visit after RTC repair and shoulder arthroscopy. The patient is post op week 6. The patient is  doing well. Physical exam:  The wounds are clean, dry, no drainage. Range of motion: diminished range of motion. She has intact motor and sensory functions, grossly intact in the median, ulnar, radial, musculocutaneous, and axillary nerve distributions. She has bounding pulses in the wrist.  Capillary refill is less than 2 seconds in all digits. Impression:  {  Encounter Diagnoses   Name Primary?  Traumatic complete tear of right rotator cuff, initial encounter Yes    Biceps tendonitis on right     Tear of right glenoid labrum, initial encounter        Plan:    The patient will now D/C the sling. She will continue range of motion at the elbow, wrist and hand and initiate physical therapy. diuscussed with patient gently ROM at first as she had a big repair  She will continue to use analgesics to control the pain.       I will see them back in 2 months  Ok to return to work 4hrs/day for 4 weeks then, 8 hrs/day from home

## 2021-03-05 RX ORDER — HYDROCODONE BITARTRATE AND ACETAMINOPHEN 7.5; 325 MG/1; MG/1
1 TABLET ORAL EVERY 6 HOURS PRN
Qty: 28 TABLET | Refills: 0 | Status: SHIPPED | OUTPATIENT
Start: 2021-03-05 | End: 2021-03-12

## 2021-03-08 ENCOUNTER — EVALUATION (OUTPATIENT)
Dept: PHYSICAL THERAPY | Age: 56
End: 2021-03-08
Payer: COMMERCIAL

## 2021-03-08 DIAGNOSIS — M75.21 BICEPS TENDONITIS ON RIGHT: ICD-10-CM

## 2021-03-08 DIAGNOSIS — S46.011A TRAUMATIC COMPLETE TEAR OF RIGHT ROTATOR CUFF, INITIAL ENCOUNTER: Primary | ICD-10-CM

## 2021-03-08 DIAGNOSIS — M79.642 LEFT HAND PAIN: Primary | ICD-10-CM

## 2021-03-08 PROCEDURE — 97140 MANUAL THERAPY 1/> REGIONS: CPT | Performed by: PHYSICAL THERAPIST

## 2021-03-08 PROCEDURE — 97110 THERAPEUTIC EXERCISES: CPT | Performed by: PHYSICAL THERAPIST

## 2021-03-08 PROCEDURE — 97162 PT EVAL MOD COMPLEX 30 MIN: CPT | Performed by: PHYSICAL THERAPIST

## 2021-03-08 NOTE — PROGRESS NOTES
[facility] OUTPATIENT REHABILITATION  PHYSICAL THERAPY INITIAL EVALUATION         Date:  3/8/2021   Patient: Lady Smith  : 1965  MRN: [id number]  Referring Provider: MARS Hernandez - [facility] Diagnosis:      Diagnosis Orders   1. Traumatic complete tear of right rotator cuff, initial encounter     2. Biceps tendonitis on right       NO STRENGTHENING UNTIL !!!    SUBJECTIVE:     Onset date: May 2018    Onset[de-identified] Sudden onset     DOSx: 2021 RIGHT SHOULDER ROTATOR CUFF TEAR, BICEPS TENODESIS    Mechanism of Injury / History: Pt was kicked by a horse in  with symptoms waxing and waning. Pt was lifting a 50lb bag on 2020 and heard a pop in her shoulder while lifting and had immediate pain and weakness following. Patient is right handed. Previous PT: none    Medical Management for Current Problem: medications, OTC meds     Chief complaint: pain, decreased motion, decreased mobility and weakness    Behavior: condition is getting better    Pain: intermittent  Current: 0/10     Best: 0/10     Worst:8/10    Symptom Type/Quality: sharp  Location[de-identified] noted above mid-deltoid region, axillary region      Aggravated by: reaching overhead, reaching out, reaching behind back, lifting/carrying/material handling    Relieved by: placing arm in sling position , ice, medication      Imaging results: No results found.     Past Medical History:  Past Medical History:   Diagnosis Date    Chronic idiopathic urticaria 2014    Hyperlipidemia     slight not on meds    Nausea & vomiting     OA (osteoarthritis) of finger     PONV (postoperative nausea and vomiting)     with anesthesia     Past Surgical History:   Procedure Laterality Date    CHOLECYSTECTOMY  10/2007    COLONOSCOPY  2011    DODIG    FRACTURE SURGERY  aug 2012    open reduction internal fixation left wrist    SHOULDER ARTHROSCOPY Right 2021  SHOULDER ARTHROSCOPY Right 1/22/2021    RIGHT SHOULDER ARTHROSCOPY, SUBACROMIAL DECOMPRESSION, ROTATOR CUFF REPAIR AND DEBRIDEMENT ([facility]) performed by Pato Fofana DO at [address]  08/09/2011       Medications:   Current Outpatient Medications   Medication Sig Dispense Refill    HYDROcodone-acetaminophen (NORCO) 7.5-325 MG per tablet Take 1 tablet by mouth every 6 hours as needed for Pain for up to 7 days. 28 tablet 0    ondansetron (ZOFRAN ODT) 4 MG disintegrating tablet Take 1 tablet by mouth every 8 hours as needed for Nausea or Vomiting 21 tablet 0    doxepin (SINEQUAN) 10 MG capsule TAKE 1 CAPSULE BY MOUTH TWO TIMES DAILY 180 capsule 2    ibuprofen (ADVIL;MOTRIN) 800 MG tablet Take 1 tablet by mouth every 6 hours as needed for Pain 21 tablet 0    Multiple Vitamins-Minerals (HAIR SKIN AND NAILS FORMULA PO) Take 1 tablet by mouth daily       No current facility-administered medications for this visit. Occupation: Pt is a nurse for [employer]. Physical demands include: lifting, carrying, pushing, pulling light weighted items, assorted work positions (kneeling, crouching, crawling, stooping), sitting, . Status: light/modified/transitional duty, 4 hours per day per Dr. Pablo Barger    Exercise regimen: weight training , walking, cardio    Hobbies: clean up after horses, horse riding, work with no pain    Patient Goals: pain relief, return to prior activity, get back to normal    Precautions/Contraindications: recent surgery    OBJECTIVE:     Observations: normal orthopedic exam    Inspection: irregular scapulohumeral rhythm right shoulder. Incision: edges approximated, no purulent drainage, no redness, no swelling, no tenderness and not hot to touch.                    Palpation: Tender to palpation mid deltoid, axillary region     Joint/Motion:  Right Shoulder:  AROM: 35° Forward elevation,  40° ER,  IR to 40  PROM: 90° Forward elevation, 45° ER,  45° IR    Left Shoulder:  AROM: WNL° Forward elevation,  WNL° ER,  IR to WNL  PROM: WNL° Forward elevation,  WNL° ER , WNL° IR    Strength:   Right Shoulder: Flexion 2-/5,  Abduction 2-/5, ER 2-/5, IR 2-/5      Left Shoulder: Flexion 4-/5,  Abduction 4-/5, ER 4-/5, IR 4-/5       Special Tests/Functional Screens:    [] Indu []+ / [] -  [] Charles's []+ / [] -   []  Vida's []+ / [] -    []GH drawer []+ / [] -    [] Bicep Load []+ / [] -   [] Crank []+ / [] -  [] Brian Dense []+ / [] -   [] Elbow Varus []+ / [] -  [] Neer's []+ / [] -      [] Speed's []+ / [] -   []Sulcus Sign []+ / [] -   [] Apprehension []+ / [] -   [] Bicep Load II []+ / [] -   [] Birdena Shade []+ / [] -   [] Elbow Valgus []+ / [] -     [] Other: []+ / [] -         ASSESSMENT     Outcome Measure:   QuickDASH (Disorders of the Arm, Shoulder, and Hand) 52% disability    Problems:    Pain reported 8/10   ROM decreased   Strength decreased 2-/5 R shoulder   Decreased functional ability with work, ADLs, use of right upper extremity, reaching, lifting, carrying    [x] There are no barriers affecting plan of care or recovery    [] Barriers to this patient's plan of care or recovery include. Domestic Concerns:  [x] No  [] Yes:    Short Term goals (4-5 weeks)   Decrease reported pain to 5/10   Increase ROM to [facility]   Increase Strength to 3-/5 R shoulder    Able to perform/complete the following functions/tasks: Perform ADLs, hobbies, housework with less pain.  QuickDASH (Disorders of the Arm, Shoulder, and Hand) 40% disability    Long Term goals (8-10 weeks)   Decrease reported pain to 0-3/10   Increase ROM to WNL   Increase Strength to 4/5 R shoulder    Able to perform/complete the following functions/tasks: Perform ADLs, hobbies, housework, job duties with less pain.     QuickDASH 0-25% disability   Independent with Home Exercise Programs    Rehab Potential: [x] Good  [] Fair  [] Poor    PLAN       Treatment Plan:   [x] Therapeutic Exercise  [x] Therapeutic Activity  [x] Neuromuscular Re-education   [] Gait Training  [] Balance Training  [] Aerobic conditioning  [x] Manual Therapy  [x] Massage/Fascial release   [] Work/Sport specific activities    [] Pain Neuroscience [x] Cold/hotpack  [] Vasocompression  [x] Electrical Stimulation  [] Lumbar/Cervical Traction  [x] Ultrasound   [] Iontophoresis: 4 mg/mL Dexamethasone Sodium Phosphate 40-80 mAmin        [x] Instruction in HEP      []  Medication allergies reviewed for use of Dexamethasone Sodium Phosphate 4mg/ml  with iontophoresis treatments. Patient is not allergic. The following CPT codes are likely to be used in the care of this patient: 04879 PT Evaluation: Moderate Complexity , 12776 PT Re-Evaluation , 41945 Therapeutic Exercise , 91858 Neuromuscular Re-Education , 30282 Therapeutic Activities , 49069 Manual Therapy ,  Electrical Stimulation      Suggested Professional Referral: [x] No  [] Yes:     Patient Education:  [x] Plans/Goals, Risks/Benefits discussed  [x] Home exercise program  Method of Education: [x] Verbal  [x] Demo  [x] Written  Comprehension of Education:  [x] Verbalizes understanding. [x] Demonstrates understanding. [] Needs Review. [] Demonstrates/verbalizes understanding of HEP/Ed previously given. Frequency:  1-2 days per week for 8-10 weeks    Patient understands diagnosis/prognosis and consents to treatment, plan and goals: [x] Yes    [] No     Thank you for the opportunity to work with your patient. If you have questions or comments, please contact me at [phone]; fax: [phone]. Electronically signed by: Gabbie Nieves, PT    Medicare Patients Only     Please sign Physician's Certification and return to:   [facility] [address]  Dept: [phone]  Dept Fax: [phone] Certification / Comments     Frequency/Duration 1-2 days per week for 8-10 weeks. Certification period from 3/8/2021  to 6/7/2021. I have reviewed the Plan of Care established for skilled therapy services and certify that the services are required and that they will be provided while the patient is under my care.     Physician's Comments/Revisions:               Physician's Printed Name:                                           [de-identified] Signature:                                                               Date:

## 2021-03-09 ENCOUNTER — TREATMENT (OUTPATIENT)
Dept: PHYSICAL THERAPY | Age: 56
End: 2021-03-09
Payer: COMMERCIAL

## 2021-03-09 ENCOUNTER — OFFICE VISIT (OUTPATIENT)
Dept: ORTHOPEDIC SURGERY | Age: 56
End: 2021-03-09
Payer: COMMERCIAL

## 2021-03-09 VITALS — BODY MASS INDEX: 20.39 KG/M2 | HEIGHT: 61 IN | WEIGHT: 108 LBS

## 2021-03-09 DIAGNOSIS — S46.011A TRAUMATIC COMPLETE TEAR OF RIGHT ROTATOR CUFF, INITIAL ENCOUNTER: Primary | ICD-10-CM

## 2021-03-09 DIAGNOSIS — S66.819A STRAIN OF FLEXOR POLLICIS LONGUS TENDON: ICD-10-CM

## 2021-03-09 DIAGNOSIS — M18.12 ARTHRITIS OF CARPOMETACARPAL (CMC) JOINT OF LEFT THUMB: Primary | ICD-10-CM

## 2021-03-09 PROCEDURE — G8427 DOCREV CUR MEDS BY ELIG CLIN: HCPCS | Performed by: ORTHOPAEDIC SURGERY

## 2021-03-09 PROCEDURE — 1036F TOBACCO NON-USER: CPT | Performed by: ORTHOPAEDIC SURGERY

## 2021-03-09 PROCEDURE — 99214 OFFICE O/P EST MOD 30 MIN: CPT | Performed by: ORTHOPAEDIC SURGERY

## 2021-03-09 PROCEDURE — G0283 ELEC STIM OTHER THAN WOUND: HCPCS

## 2021-03-09 PROCEDURE — 3017F COLORECTAL CA SCREEN DOC REV: CPT | Performed by: ORTHOPAEDIC SURGERY

## 2021-03-09 PROCEDURE — 97110 THERAPEUTIC EXERCISES: CPT

## 2021-03-09 PROCEDURE — G8420 CALC BMI NORM PARAMETERS: HCPCS | Performed by: ORTHOPAEDIC SURGERY

## 2021-03-09 PROCEDURE — 97140 MANUAL THERAPY 1/> REGIONS: CPT

## 2021-03-09 PROCEDURE — G8484 FLU IMMUNIZE NO ADMIN: HCPCS | Performed by: ORTHOPAEDIC SURGERY

## 2021-03-09 NOTE — PROGRESS NOTES
Chief Complaint   Patient presents with    Shoulder Pain     Left thumb pain. Increased pain in the past 2 weeks when pushing to turn on sweeper. Previously broke left wrist in 2012       Doyle Severe is a 60y.o. year old  female who presents for evaluation of left hand pain. she reports this started 2 weeks ago. she does remember a specific injury that started the pain. She pushed a button to turn sweeper on and hurt left thumb and is unable to bend left thumb at DIP. Lysbeth Carrel The injury was none. Her pain is located mainly over base of left thumb. The pain is worse with movement and better with rest.  The patient has tried OTC analgesics, ice, avoidance of offending activity. The treatment has not been effective. The patient is Right hand dominant. Past Medical History:   Diagnosis Date    Chronic idiopathic urticaria 02/2014    Hyperlipidemia     slight not on meds    Nausea & vomiting     OA (osteoarthritis) of finger     PONV (postoperative nausea and vomiting)     with anesthesia     Past Surgical History:   Procedure Laterality Date    CHOLECYSTECTOMY  10/2007    COLONOSCOPY  08/09/2011    DODIG    FRACTURE SURGERY  aug 2012    open reduction internal fixation left wrist    SHOULDER ARTHROSCOPY Right 01/21/2021    SHOULDER ARTHROSCOPY Right 1/22/2021    RIGHT SHOULDER ARTHROSCOPY, SUBACROMIAL DECOMPRESSION, ROTATOR CUFF REPAIR AND DEBRIDEMENT ([address] [facility]) performed by Anthony Talavera DO at [address]  08/09/2011       Current Outpatient Medications:     HYDROcodone-acetaminophen (NORCO) 7.5-325 MG per tablet, Take 1 tablet by mouth every 6 hours as needed for Pain for up to 7 days. , Disp: 28 tablet, Rfl: 0    ondansetron (ZOFRAN ODT) 4 MG disintegrating tablet, Take 1 tablet by mouth every 8 hours as needed for Nausea or Vomiting, Disp: 21 tablet, Rfl: 0    doxepin (SINEQUAN) 10 MG capsule, TAKE 1 CAPSULE BY MOUTH TWO TIMES DAILY, Disp: 180 capsule, Rfl: 2    ibuprofen (ADVIL;MOTRIN) 800 MG tablet, Take 1 tablet by mouth every 6 hours as needed for Pain, Disp: 21 tablet, Rfl: 0    Multiple Vitamins-Minerals (HAIR SKIN AND NAILS FORMULA PO), Take 1 tablet by mouth daily, Disp: , Rfl:   Allergies   Allergen Reactions    Vicodin [Hydrocodone-Acetaminophen] Nausea And Vomiting     Social History     Socioeconomic History    Marital status:      Spouse name: Lewis Stafford    Number of children: 2    Years of education: BSN    Highest education level: Not on file   Occupational History    Occupation: RN   Social Needs    Financial resource strain: Not hard at all   [employer] insecurity     Worry: Never true     Inability: Never true   [employer] needs     Medical: No     Non-medical: No   Tobacco Use    Smoking status: Never Smoker    Smokeless tobacco: Never Used   Substance and Sexual Activity    Alcohol use: Yes     Comment: rare    Drug use: No    Sexual activity: Yes     Partners: Male     Birth control/protection: Post-menopausal   Lifestyle    Physical activity     Days per week: 5 days     Minutes per session: 60 min    Stress:  Only a little   Relationships    Social connections     Talks on phone: More than three times a week     Gets together: More than three times a week     Attends [employer] service: More than 4 times per year     Active member of club or organization: Yes     Attends meetings of clubs or organizations: More than 4 times per year     Relationship status:     Intimate partner violence     Fear of current or ex partner: No     Emotionally abused: No     Physically abused: No     Forced sexual activity: No   Other Topics Concern    Not on file   Social History Narrative    Not on file     Family History   Problem Relation Age of Onset    Stroke Mother     Colon Cancer Father     Other Father     Cancer Sister         Breast    Other Sister        REVIEW OF SYSTEMS:     General/Constitution: (-)weight loss, (-)fever, (-)chills, (-)weakness. Skin: (-) rash,(-) psoriasis,(-) eczema, (-)skin cancer. Musculoskeletal: (-) fractures,  (-) dislocations,(-) collagen vascular disease, (-) fibromyalgia, (-) multiple sclerosis, (-) muscular dystrophy, (-) RSD,(-) joint pain (-)swelling, (-) joint pain,swelling. Neurologic: (-) epilepsy, (-)seizures,(-) brain tumor,(-) TIA, (-)stroke, (-)headaches, (-)Parkinson disease,(-) memory loss, (-) LOC. Cardiovascular: (-) Chest pain, (-) swelling in legs/feet, (-) SOB, (-) cramping in legs/feet with walking. Respiratory: (-) SOB, (-) Coughing, (-) night sweats. GI: (-) nausea, (-) vomiting, (-) diarrhea, (-) blood in stool, (-) gastric ulcer. Psychiatric: (-) Depression, (-) Anxiety, (-) bipolar disease, (-) Alzheimer's Disease  Allergic/Immunologic: (-) allergies latex, (-) allergies metal, (-) skin sensitivity. Hematlogic: (-) anemia, (-) blood transfusion, (-) DVT/PE, (-) Clotting disorders    SUBJECTIVE:    Constitution:    Ht 5' 1\" (1.549 m)   Wt 108 lb (49 kg)   LMP 08/13/2012   BMI 20.41 kg/m²     Psycihatric:  The patient is alert and oriented x 3, appears to be stated age and in no distress. Respiratory:  ReSpiratory effort is not labored. Patient is not gasping. Palpation of the chest reveals no tactile fremitus. Skin:  Upon inspection: the skin appears warm, dry and intact. There is  a previous scar over the affected area. There is not any cellulitis, lymphedema or cutaneous lesions noted in the lower extremities. Upon palpation there is no induration noted. Neurologic:    Gait: normal;  Motor exam of the upper extremities show: The reflexes in biceps/triceps/brachioradialis are equal and symmetric. Sensory exam C5-T1 are normal bilaterally. Cardiovascular: The vascular exam is normal and is well perfused to distal extremities. There are 2+ radial pulses bilaterally, and motor and sensation is intact to median, ulnar, and radial, musclocutaneus, and axillary nerve distribution and grossly symmetric bilaterally. There is cap refill noted less than two seconds in all digits. There is not edema of the bilateral lower extremities. There is not varicosities noted in the distal extremities. Lymph:  Upon palpation,  there is no lymphadenopathy noted in bilateral lower extremities. Musculoskeletal:  Gait: normal; examination of the nails and digits reveal no cyanosis or clubbing. Cervical Exam:  On physical exam, Jesus Kaur is well-developed, well-nourished, oriented to person, place and time. her gait is normal.  On evaluation of her cervical spine, she has full range of motion of the cervical spine without pain. There is no cervical tenderness to palpation. Shoulder Exam:  On evaluation of her bilaterally upper extremities, her bilateral shoulder has no deformity. There is not evidence of scapular dyskinesis. There is not muscle atrophy in shoulder girdle. The range of motion for the Right Shoulder is 130/35/pl and for the Left shoulder is 150/50/t8. Right shoulder Motor strength is 5-/5 in the supraspinatus, 5/5 internal rotation and 5-/5 in external rotation, and Left shoulder motor strength 55/5 in supraspinatus, 5/5 in internal rotation, 5/5 in external rotation. Elbow exam:  Evaluation of the elbow, reveals no signs of swelling or deformity. ROM is 0-140. There is not instability with varus/valgus stresses. Motor strength is 5/5 with flexion/extension. Wrist exam:  Inspection of the bilateral upper extremities, there is no evidence of deformity of the wrist.  ROM Wrist ROM R wrist DF 90, VF 90, L wrist DF 90, VF 90, R pronation 90/ supination 90, L pronation 90/supination 90. Motor strength is 5/5 with Dorsiflexion/Volarflexion/Supination/Pronation.   Motor and sensation is intact and symmetric throughout the bilateral upper extremities in the median, ulnar and radial , musclcutaneous, and axillary nerve distributions. Hand exam:  The skin overlying the hand is  intact. There is not evidence of scar, lesion, laceration, or abrasion. The motion in the small joints of the hand are intact with no stiffness or deformity. The ROM in the MCP flexion 80/ extension 0 , PIP flexion 80/ extension 0, DIP flexion 0/ extension 0. There is not rotational deformity. There is no masses or adenopathy in bilateral upper extremities. Radial pulses are 2+ and symmetric bilaterally. Capillary refill is intact and < 2 seconds. Motor strength is 5/5 with flexion and extension of the small finger joints. Inability to flex DIP     Right:  Phallens sign negative, Tinnells sign negative, Median nerve compression test negative ,  Finklesteins negative, CMC Grind test negative, Piano Key Test negative. Left:  Phallens sign negative, Tinnells sign negative, Median nerve compression test negative ,  Finklesteins negative, CMC Grind test positive, Piano Key Test negative. Xrays:   Very severe osteoarthritis at sites outlined above with findings suggesting   the possibility of erosive osteoarthritis.  Postsurgical changes at the   radius as noted. Radiographic findings reviewed with patient    Impression:   Encounter Diagnosis   Name Primary?  Arthritis of carpometacarpal (CMC) joint of left thumb Yes       Plan: Natural history and expected course discussed. Questions answered. Educational materials distributed. Rest, ice, compression, and elevation (RICE) therapy. Reduction in offending activity discussed. I had a lengthy discussion with the patient regarding their diagnosis. I explained treatment options including surgical vs non surgical treatment. I reviewed in detail the risks and benefits and outlined the procedure in detail with expected outcomes and possible complications. I also discussed non surgical treatment such as injections (CSI), physical therapy, topical creams and NSAID's.  They have elected for conservative management at this time.    Dalia fenton

## 2021-03-09 NOTE — PROGRESS NOTES
Physical Therapy Daily Treatment Note    Date: 3/9/2021  Patient Name: Jona Wagner  : 1965   MRN: [id number]  DOInjury: 2020  DOSx: 2021 RIGHT SHOULDER ROTATOR CUFF TEAR, BICEPS TENODESIS  Referring Provider:  Sabrina Montana, APRN - [address] [facility]             Medical Diagnosis:      Diagnosis Orders   1. Traumatic complete tear of right rotator cuff, initial encounter       NO STRENGTHENING UNTIL !!! Outcome Measure: Quick Dash: 52% Impairment    S: Pt states she has moderate R shoulder pain also going to have L hand thumb pain also can't use it much. Pt states she likes using ice at home. O: Pt given written HEP  Time [id number]     Visit 2/  Repeat outcome measure at mid point and end.     Pain 6/10     ROM See eval     Modalities      Ice + ES to R shoulder  X 15 min  Post ex's  mo               Manual 10 mins flex, abd, ER, IR  mt               Stretch      Table slides 3 reps x 1 set with 10s holds each for flex, scap, abd, ER, IR 5-10 reps of each next time TE   Wall Walk Flex      Wall Walk ABD      Wall Pec/ER Stretch      Wall ER Stretch      Towel IR Stretch      Cross Body Adduction      Supine Stretch with Arms Behind Head            Exercise      UBE          Pulleys - Flex      Pulleys - IR      Supine Wand Flex 3 x 10  TE   Supine Wand Bench Press 3 x 10  TE   Supine Wand ER/IR 3 x 10  TE   Standing Wand IR      Standing Wand Scaption      Standing Wand Flex      Standing Wand ER/IR      Shrugs AROM       Pendulum Ex 1 min each, fwd/bwd, side to side, cwise, ccwise  TE         Supine Flex      S-lying ABD      S-lying ER            Prone Flexion      Prone Ext      Prone Row with ER      Prone Horizontal ABD            Standing Flex      Standing ER/IR      Standing Scaption       Standing ABD      Standing Shoulder Press       Functional activities To aid in ROM and strength needed for reaching , lifting ,pushing and pulling at home/work    ROWS: H       ROWS: M  \"    ROWS: L  \"    ER  \"    IR  \"    Punches      Shoulder Press      Shoulder Flex      Shoulder ABD            Weighted Machines      Lat Pull Down      Vertical Row      A:  Tolerated well. Above added to written HEP.   P: Continue with rehab plan  Funmilayo Hopper PTA    Treatment Charges: Mins Units   Initial Evaluation     Re-Evaluation     Ther Exercise         TE 20 1   Manual Therapy     MT X 10 min  1   Ther Activities        TA     Gait Training          GT     Neuro Re-education NR     Modalities X 15 1   Non-Billable Service Time     Other     Total Time/Units 45 3

## 2021-03-15 ENCOUNTER — TREATMENT (OUTPATIENT)
Dept: PHYSICAL THERAPY | Age: 56
End: 2021-03-15
Payer: COMMERCIAL

## 2021-03-15 DIAGNOSIS — S46.011A TRAUMATIC COMPLETE TEAR OF RIGHT ROTATOR CUFF, INITIAL ENCOUNTER: Primary | ICD-10-CM

## 2021-03-15 PROCEDURE — 97110 THERAPEUTIC EXERCISES: CPT

## 2021-03-15 PROCEDURE — 97140 MANUAL THERAPY 1/> REGIONS: CPT

## 2021-03-15 PROCEDURE — G0283 ELEC STIM OTHER THAN WOUND: HCPCS

## 2021-03-15 NOTE — PROGRESS NOTES
Physical Therapy Daily Treatment Note    Date: 3/15/2021  Patient Name: Alie Horan  : 1965   MRN: [id number]  DOInjury: 2020  DOSx: 2021 RIGHT SHOULDER ROTATOR CUFF TEAR, BICEPS TENODESIS  Referring Provider:  MARS Bowden - [facility] [address] [place] [address]             Medical Diagnosis:      Diagnosis Orders   1. Traumatic complete tear of right rotator cuff, initial encounter       NO STRENGTHENING UNTIL !!! Outcome Measure: Quick Dash: 52% Impairment    S: Pt states  Her shoulder was swollen yesterday after walking awhile outside , states she uses ice a lot at home also. O: Pt given written HEP  Time 3175- 1600     Visit 3 /  Repeat outcome measure at mid point and end.     Pain 5 /10     ROM See eval     Modalities      Ice + ES to R shoulder  X 15 min  Post ex's  mo               Manual 10 mins flex, abd, ER, IR  mt               Stretch      Table slides 3 reps x 1 set with 10s holds each for flex, scap, abd, ER, IR 5-10 reps of each next time TE   Wall Walk Flex      Wall Walk ABD      Wall Pec/ER Stretch      Wall ER Stretch      Towel IR Stretch      Cross Body Adduction      Supine Stretch with Arms Behind Head            Exercise      UBE          Pulleys - Flex X 5 min new    TE   Pulleys - IR X 3 min new   TE         Supine Wand Flex 3 x 10  TE   Supine Wand Bench Press 3 x 10  TE   Supine Wand ER/IR 3 x 10  TE   Standing Wand IR      Standing Wand Scaption      Standing Wand Flex      Standing Wand ER/IR      Shrugs AROM       Pendulum Ex 1 min each, fwd/bwd, side to side, cwise, ccwise  TE         Supine Flex      S-lying ABD      S-lying ER            Prone Flexion      Prone Ext      Prone Row with ER      Prone Horizontal ABD            Standing Flex      Standing ER/IR      Standing Scaption       Standing ABD      Standing Shoulder Press       Functional activities To aid in ROM and strength needed for reaching , lifting ,pushing and pulling at home/work    ROWS: H 4/16      ROWS: M 4/16 \"    ROWS: L 4/16 \"    ER 4/16 \"    IR 4/16 \"    Punches      Shoulder Press      Shoulder Flex      Shoulder ABD            Weighted Machines      Lat Pull Down      Vertical Row      A:  Tolerated well.progression with newly listed stretching ex's . Above added to written HEP.   P: Continue with rehab plan  John Walter PTA    Treatment Charges: Mins Units   Initial Evaluation     Re-Evaluation     Ther Exercise         TE 20 1   Manual Therapy     MT X 10 min  1   Ther Activities        TA     Gait Training          GT     Neuro Re-education NR     Modalities X 15 1   Non-Billable Service Time     Other     Total Time/Units 45 3

## 2021-03-17 ENCOUNTER — TREATMENT (OUTPATIENT)
Dept: PHYSICAL THERAPY | Age: 56
End: 2021-03-17
Payer: COMMERCIAL

## 2021-03-17 DIAGNOSIS — S46.011A TRAUMATIC COMPLETE TEAR OF RIGHT ROTATOR CUFF, INITIAL ENCOUNTER: Primary | ICD-10-CM

## 2021-03-17 PROCEDURE — 97110 THERAPEUTIC EXERCISES: CPT | Performed by: PHYSICAL THERAPIST

## 2021-03-17 NOTE — PROGRESS NOTES
Physical Therapy Daily Treatment Note    Date: 3/17/2021  Patient Name: Taqueria Mireles  : 1965   MRN: [id number]  DOInjury: 2020  DOSx: 2021 RIGHT SHOULDER ROTATOR CUFF TEAR, BICEPS TENODESIS  Referring Provider:  Imer Jones, APRN - [address] [facility] [address] [place] [address]             Medical Diagnosis:      Diagnosis Orders   1. Traumatic complete tear of right rotator cuff, initial encounter       NO STRENGTHENING UNTIL !!! Outcome Measure: Quick Dash: 52% Impairment    S: Pt states  Her shoulder was swollen yesterday after walking awhile outside, states she uses ice a lot at home also. O: Pt given written HEP  Time [id number]     Visit 4/  Repeat outcome measure at mid point and end.     Pain 6/10     ROM See eval     Modalities      Ice + ES to R shoulder   Post ex's  mo               Manual   mt               Stretch      Table slides  HEP TE   Wall Walk Flex 10 reps x 1 set with 10s holds     Wall Walk ABD 10 reps x 1 set with 10s holds     Wall Pec/ER Stretch      Wall ER Stretch 10 reps x 1 set with 10s holds     Towel IR Stretch      Cross Body Adduction      Supine Stretch with Arms Behind Head            Exercise      UBE          Pulleys - Flex X 4 min    TE   Pulleys - IR X 4 min   TE         Supine Wand Flex 2 x 15  TE   Supine Wand Bench Press 2 x 15  TE   Supine Wand ER/IR 2 x 15  TE   Standing Wand IR 2 x 15     Standing Wand Scaption 2 x 15      Standing Wand Flex      Standing Wand ER/IR      Shrugs AROM       Pendulum Ex   TE         Supine Flex      S-lying ABD      S-lying ER            Prone Flexion      Prone Ext      Prone Row with ER      Prone Horizontal ABD            Standing Flex      Standing ER/IR      Standing Scaption       Standing ABD      Standing Shoulder Press       Functional activities To aid in ROM and strength needed for reaching , lifting ,pushing and pulling at home/work    ROWS: H       ROWS: M  \"    ROWS: L  \"    ER 4/16 \"    IR 4/16 \"    Punches      Shoulder Press      Shoulder Flex      Shoulder ABD            Weighted Machines      Lat Pull Down      Vertical Row      A:  Tolerated well.progression with newly listed stretching ex's . Above added to written HEP. P: Continue with rehab plan. If pt continues to tolerate exercises well, increase hold times for wand exercises and stretches.    Sylvester Gregory, PT    Treatment Charges: Mins Units   Initial Evaluation     Re-Evaluation     Ther Exercise         TE 45 3   Manual Therapy     MT     Ther Activities        TA     Gait Training          GT     Neuro Re-education NR     Modalities     Non-Billable Service Time     Other     Total Time/Units 45 3

## 2021-03-18 ENCOUNTER — OFFICE VISIT (OUTPATIENT)
Dept: ORTHOPEDIC SURGERY | Age: 56
End: 2021-03-18
Payer: COMMERCIAL

## 2021-03-18 VITALS — BODY MASS INDEX: 21.6 KG/M2 | HEIGHT: 60 IN | RESPIRATION RATE: 18 BRPM | WEIGHT: 110 LBS

## 2021-03-18 DIAGNOSIS — M18.12 ARTHRITIS OF CARPOMETACARPAL (CMC) JOINT OF LEFT THUMB: Primary | ICD-10-CM

## 2021-03-18 DIAGNOSIS — S66.819A: ICD-10-CM

## 2021-03-18 PROCEDURE — 99214 OFFICE O/P EST MOD 30 MIN: CPT | Performed by: ORTHOPAEDIC SURGERY

## 2021-03-18 PROCEDURE — 3017F COLORECTAL CA SCREEN DOC REV: CPT | Performed by: ORTHOPAEDIC SURGERY

## 2021-03-18 PROCEDURE — G8427 DOCREV CUR MEDS BY ELIG CLIN: HCPCS | Performed by: ORTHOPAEDIC SURGERY

## 2021-03-18 PROCEDURE — G8484 FLU IMMUNIZE NO ADMIN: HCPCS | Performed by: ORTHOPAEDIC SURGERY

## 2021-03-18 PROCEDURE — G8420 CALC BMI NORM PARAMETERS: HCPCS | Performed by: ORTHOPAEDIC SURGERY

## 2021-03-18 PROCEDURE — 1036F TOBACCO NON-USER: CPT | Performed by: ORTHOPAEDIC SURGERY

## 2021-03-18 RX ORDER — HYDROCODONE BITARTRATE AND ACETAMINOPHEN 7.5; 325 MG/1; MG/1
1 TABLET ORAL EVERY 6 HOURS PRN
Status: ON HOLD | COMMUNITY
End: 2021-03-26 | Stop reason: HOSPADM

## 2021-03-18 NOTE — PROGRESS NOTES
Department of Orthopedic Surgery  History and Physical      CHIEF COMPLAINT: Left thumb pain    HISTORY OF PRESENT ILLNESS:                The patient is a 60 y.o. right-hand-dominant female who presents with left thumb pain which began 2 weeks prior. Patient states she was attempting to turn on her vacuum  when her thumb jammed against a button. She felt immediate jolt in her thumb and was unable to flex her thumb at the IP joint afterwards. She was evaluated by Dr. Zamarripa and he subsequently referred patient to the office for evaluation. Since the injury, patient has been unable to flex her thumb. She states it has been getting in the way with her daily activities. She does work from home on a computer. Patient does have a history significant for left wrist fracture repaired in 2012. She denies any numbness or paresthesias in the left hand. She does have underlying arthritis in the left hand with limited mobility at several joints. Patient has been taking ibuprofen with minimal relief. Past Medical History:        Diagnosis Date    Chronic idiopathic urticaria 02/2014    Hyperlipidemia     slight not on meds    Nausea & vomiting     OA (osteoarthritis) of finger     PONV (postoperative nausea and vomiting)     with anesthesia     Past Surgical History:        Procedure Laterality Date    CHOLECYSTECTOMY  10/2007    COLONOSCOPY  08/09/2011    DODIG    FRACTURE SURGERY  aug 2012    open reduction internal fixation left wrist    SHOULDER ARTHROSCOPY Right 01/21/2021    SHOULDER ARTHROSCOPY Right 1/22/2021    RIGHT SHOULDER ARTHROSCOPY, SUBACROMIAL DECOMPRESSION, ROTATOR CUFF REPAIR AND DEBRIDEMENT ([address]) performed by Anthony Talavera DO at [address]  08/09/2011     Current Medications:   No current facility-administered medications for this visit.    Allergies:  Vicodin [hydrocodone-acetaminophen]    Social History:   TOBACCO:   reports that she has never smoked. She has never used smokeless tobacco.  ETOH:   reports current alcohol use. DRUGS:   reports no history of drug use. ACTIVITIES OF DAILY LIVING:    OCCUPATION:    Family History:       Problem Relation Age of Onset    Stroke Mother     Colon Cancer Father     Other Father     Cancer Sister         Breast    Other Sister        REVIEW OF SYSTEMS:  CONSTITUTIONAL:  negative  HEENT:  negative  RESPIRATORY:  negative  CARDIOVASCULAR:  negative  GASTROINTESTINAL:  negative  HEMATOLOGIC/LYMPHATIC:  negative  MUSCULOSKELETAL: Positive for left thumb pain  NEUROLOGICAL:  Negative for numbness  BEHAVIOR/PSYCH:  negative    PHYSICAL EXAM:    VITALS:  Resp 18   Ht 5' (1.524 m)   Wt 110 lb (49.9 kg)   LMP 08/13/2012   BMI 21.48 kg/m²   CONSTITUTIONAL:  awake, alert, cooperative, no apparent distress, and appears stated age  EYES:  Lids and lashes normal, pupils equal, round and reactive to light, extra ocular muscles intact, sclera clear, conjunctiva normal  ENT:  Normocephalic, without obvious abnormality, atraumatic, sinuses nontender on palpation, external ears without lesions, oral pharynx with moist mucus membranes, tonsils without erythema or exudates, gums normal and good dentition. NECK:  Supple, symmetrical, trachea midline, no adenopathy, thyroid symmetric, not enlarged and no tenderness, skin normal  LUNGS:  CTA  CARDIOVASCULAR:  2+ radial pulses, extremities warm and well perfused  ABDOMEN:  NTTP  CHEST:  Atraumatic   GENITAL/URINARY:  deferred  NEUROLOGIC:  Awake, alert, oriented to name, place and time. Cranial nerves II-XII are grossly intact. Motor is 5 out of 5 bilaterally.   Sensory is intact.  gait is normal.  MUSCULOSKELETAL:    Left upper extremity:  · Skin intact circumferentially  · Deformity noted to several joints of the left hand  · Prior volar scar over the distal forearm  · Left thumb hyperextended at the IP joint  · Unable to actively flex at the left thumb IP including but not limited to the risks of infection, possible damage to nerves, vessels, or tendons, stiffness, loss of range of motion, scar sensitivity, wound healing complications, worsening symptoms, possible need for therapy, as well as the possible need further surgery and unanticipated complications. The patient voiced understanding and all questions were answered. The patient elected to proceed with surgical intervention. I have seen and evaluated the patient and agree with the above assessment and plan on today's visit. I have performed the key components of the history and physical examination with significant findings of attritional rupture of thumb FPL tendon with flexor tenosynovitis and retained volar plate of the distal radius. Patient also demonstrates history of rupture of the index profundus on that same hand. Plan for flexor tenolysis with plate removal and reconstruction of thumb flexor tendon. Discussed autograft harvest and donor site morbidity in addition to the risk of refracture and stiffness and rupture of the reconstruction. I concur with the findings and plan as documented.     Judge Sarai MD  3/18/2021

## 2021-03-22 ENCOUNTER — TREATMENT (OUTPATIENT)
Dept: PHYSICAL THERAPY | Age: 56
End: 2021-03-22
Payer: COMMERCIAL

## 2021-03-22 ENCOUNTER — PREP FOR PROCEDURE (OUTPATIENT)
Dept: ORTHOPEDIC SURGERY | Age: 56
End: 2021-03-22

## 2021-03-22 ENCOUNTER — HOSPITAL ENCOUNTER (OUTPATIENT)
Age: 56
Discharge: HOME | End: 2021-03-24
Payer: COMMERCIAL

## 2021-03-22 DIAGNOSIS — Z01.818 PREOP TESTING: ICD-10-CM

## 2021-03-22 DIAGNOSIS — S46.011A TRAUMATIC COMPLETE TEAR OF RIGHT ROTATOR CUFF, INITIAL ENCOUNTER: Primary | ICD-10-CM

## 2021-03-22 PROCEDURE — U0005 INFEC AGEN DETEC AMPLI PROBE: HCPCS

## 2021-03-22 PROCEDURE — U0003 INFECTIOUS AGENT DETECTION BY NUCLEIC ACID (DNA OR RNA); SEVERE ACUTE RESPIRATORY SYNDROME CORONAVIRUS 2 (SARS-COV-2) (CORONAVIRUS DISEASE [COVID-19]), AMPLIFIED PROBE TECHNIQUE, MAKING USE OF HIGH THROUGHPUT TECHNOLOGIES AS DESCRIBED BY CMS-2020-01-R: HCPCS

## 2021-03-22 PROCEDURE — 97110 THERAPEUTIC EXERCISES: CPT

## 2021-03-22 PROCEDURE — G0283 ELEC STIM OTHER THAN WOUND: HCPCS

## 2021-03-22 RX ORDER — SODIUM CHLORIDE 9 MG/ML
INJECTION, SOLUTION INTRAVENOUS CONTINUOUS
Status: CANCELLED | OUTPATIENT
Start: 2021-03-22

## 2021-03-22 RX ORDER — SODIUM CHLORIDE 0.9 % (FLUSH) 0.9 %
10 SYRINGE (ML) INJECTION PRN
Status: CANCELLED | OUTPATIENT
Start: 2021-03-22

## 2021-03-22 RX ORDER — SODIUM CHLORIDE 0.9 % (FLUSH) 0.9 %
10 SYRINGE (ML) INJECTION EVERY 12 HOURS SCHEDULED
Status: CANCELLED | OUTPATIENT
Start: 2021-03-22

## 2021-03-22 NOTE — PROGRESS NOTES
Have you been tested for COVID  Yes           Have you been told you were positive for COVID No  Have you had any known exposure to someone that is positive for COVID No  Do you have a cough                   No              Do you have shortness of breath No                 Do you have a sore throat            No                Are you having chills                    No                Are you having muscle aches. No                    Please come to the hospital wearing a mask and have your significant other wear a mask as well. Both of you should check your temperature before leaving to come here,  if it is 100 or higher please call [phone] for instruction. Jody PRE-ADMISSION TESTING INSTRUCTIONS    The Preadmission Testing patient is instructed accordingly using the following criteria (check applicable):    ARRIVAL INSTRUCTIONS:  [x] Parking the day of Surgery is located in the Main Entrance lot. Upon entering the door, make an immediate right to the surgery reception desk    [x] Bring photo ID and insurance card    [] Bring in a copy of Living will or Durable Power of  papers.     [x] Please be sure to arrange for responsible adult to provide transportation to and from the hospital    [x] Please arrange for responsible adult to be with you for the 24 hour period post procedure due to having anesthesia      GENERAL INSTRUCTIONS:    [x] Nothing by mouth after midnight, including gum, candy, mints or water    [x] You may brush your teeth, but do not swallow any water    [] Take medications as instructed with 1-2 oz of water    [x] Stop herbal supplements and vitamins 5 days prior to procedure    [] Follow preop dosing of blood thinners per physician instructions    [] Take 1/2 dose of evening insulin, but no insulin after midnight    [] No oral diabetic medications after midnight    [] If diabetic and have low blood sugar or feel symptomatic, take 1-2oz apple juice only    [] Bring inhalers day of surgery    [] Bring C-PAP/ Bi-Pap day of surgery    [] Bring urine specimen day of surgery    [x] Shower or bath with soap, lather and rinse well, AM of Surgery, no lotion, powders or creams to surgical site    [] Follow bowel prep as instructed per surgeon    [x] No tobacco products within 24 hours of surgery     [x] No alcohol or illegal drug use within 24 hours of surgery.     [x] Jewelry, body piercing's, eyeglasses, contact lenses and dentures are not permitted into surgery (bring cases)      [x] Please do not wear any nail polish, make up or hair products on the day of surgery    [x] You can expect a call the business day prior to procedure to notify you if your arrival time changes    [x] If you receive a survey after surgery we would greatly appreciate your comments    [] Parent/guardian of a minor must accompany their child and remain on the premises  the entire time they are under our care     [] Pediatric patients may bring favorite toy, blanket or comfort item with them    [] A caregiver or family member must remain with the patient during their stay if they are mentally handicapped, have dementia, disoriented or unable to use a call light or would be a safety concern if left unattended    [x] Please notify surgeon if you develop any illness between now and time of surgery (cold, cough, sore throat, fever, nausea, vomiting) or any signs of infections  including skin, wounds, and dental.    []  The Outpatient Pharmacy is available to fill your prescription here on your day of surgery, ask your preop nurse for details    [] Other instructions    EDUCATIONAL MATERIALS PROVIDED:    [] PAT Preoperative Education Packet/Booklet     [] Medication List    [] Transfusion bracelet applied with instructions    [] Shower with soap, lather and rinse well, and use CHG wipes provided the evening before surgery as instructed    [] Incentive spirometer with instructions

## 2021-03-22 NOTE — PROGRESS NOTES
Physical Therapy Daily Treatment Note    Date: 3/22/2021  Patient Name: Janes Okeefe  : 1965   MRN: [id number]  DOInjury: 2020  DOSx: 2021 RIGHT SHOULDER ROTATOR CUFF TEAR, BICEPS TENODESIS  Referring Provider:  Maria Esther Alegre, APRN - [facility]             Medical Diagnosis:      Diagnosis Orders   1. Traumatic complete tear of right rotator cuff, initial encounter       NO STRENGTHENING UNTIL !!! Outcome Measure: Quick Dash: 52% Impairment    S: Pt states general soreness R shoulder also that she will be having sx on L hand thumb soon. O: Pt given written HEP  Time [phone] Pt arrived early     Visit 5 /  Repeat outcome measure at mid point and end.     Pain 5 /10     ROM See eval     Modalities        MH + ES to R shoulder  X 15 min  Post ex's  mo     Pt likes MH vs ice better           Manual   mt               Stretch      Table slides  HEP TE   Wall Walk Flex 10 reps x 1 set with 10s holds     Wall Walk ABD 10 reps x 1 set with 10s holds     Wall Pec/ER Stretch      Wall ER Stretch 10 reps x 1 set with 10s holds     Towel IR Stretch      Cross Body Adduction      Supine Stretch with Arms Behind Head            Exercise      UBE          Pulleys - Flex X 4 min    TE   Pulleys - IR X 4 min   TE         Supine Wand Flex 2 x 15  TE   Supine Wand Bench Press 2 x 15  TE   Supine Wand ER/IR 2 x 15  TE   Standing Wand IR 2 x 15     Standing Wand Scaption 2 x 15      Standing Wand Flex      Standing Wand ER/IR      Shrugs AROM       Pendulum Ex   TE         Supine Flex      S-lying ABD      S-lying ER            Prone Flexion      Prone Ext      Prone Row with ER      Prone Horizontal ABD            Standing Flex      Standing ER/IR      Standing Scaption       Standing ABD      Standing Shoulder Press       Functional activities To aid in ROM and strength needed for reaching , lifting ,pushing and pulling at home/work    ROWS: H       ROWS: M 4/16 \"    ROWS: L 4/16 \"    ER 4/16 \"    IR 4/16 \"    Punches      Shoulder Press      Shoulder Flex      Shoulder ABD            Weighted Machines      Lat Pull Down      Vertical Row      A:  Tolerated well. Above added to written HEP. P: Continue with rehab plan. If pt continues to tolerate exercises well, increase hold times for wand exercises and stretches.  Pt going to have sx on L hand thumb on 4/26/2021   Jeanna Wisdom, PTA    Treatment Charges: Mins Units   Initial Evaluation     Re-Evaluation     Ther Exercise         TE 30 2   Manual Therapy     MT     Ther Activities        TA     Gait Training          GT     Neuro Re-education NR     Modalities X 15  1   Non-Billable Service Time     Other     Total Time/Units 45 3

## 2021-03-24 ENCOUNTER — TREATMENT (OUTPATIENT)
Dept: PHYSICAL THERAPY | Age: 56
End: 2021-03-24
Payer: COMMERCIAL

## 2021-03-24 DIAGNOSIS — M75.21 BICEPS TENDONITIS ON RIGHT: ICD-10-CM

## 2021-03-24 DIAGNOSIS — S46.011A TRAUMATIC COMPLETE TEAR OF RIGHT ROTATOR CUFF, INITIAL ENCOUNTER: Primary | ICD-10-CM

## 2021-03-24 LAB — SARS-COV-2, PCR: NOT DETECTED

## 2021-03-24 PROCEDURE — 97110 THERAPEUTIC EXERCISES: CPT

## 2021-03-24 PROCEDURE — G0283 ELEC STIM OTHER THAN WOUND: HCPCS

## 2021-03-24 NOTE — PROGRESS NOTES
Physical Therapy Daily Treatment Note    Date: 3/24/2021  Patient Name: Tammy Marcos  : 1965   MRN: [id number]  DOInjury: 2020  DOSx: 2021 RIGHT SHOULDER ROTATOR CUFF TEAR, BICEPS TENODESIS  Referring Provider:  MARS Ramirez - [facility]             Medical Diagnosis:      Diagnosis Orders   1. Traumatic complete tear of right rotator cuff, initial encounter     2. Biceps tendonitis on right       NO STRENGTHENING UNTIL !!! Outcome Measure: Quick Dash: 52% Impairment    S: Pt states she has noted increase in pain today possibly due to not being able to take ibuprofen since last rx session   Due to scheduled sx on L hand thumb on 2021   Time [phone] Pt arrived early     Visit 6  /  Repeat outcome measure at mid point and end.     Pain 5 /10     ROM See eval     Modalities        Ice   + ES to R shoulder  Ice/ ES   X 15 min  Post ex's  mo               Manual   mt               Stretch      Table slides  HEP TE   Wall Walk Flex 10 reps x 1 set with 10s holds     Wall Walk ABD 10 reps x 1 set with 10s holds     Wall Pec/ER Stretch      Wall ER Stretch 10 reps x 1 set with 10s holds     Towel IR Stretch      Cross Body Adduction      Supine Stretch with Arms Behind Head            Exercise      UBE          Pulleys - Flex X 4 min    TE   Pulleys - IR X 4 min   TE         Supine Wand Flex 2 x 15  TE   Supine Wand Bench Press 2 x 15  TE   Supine Wand ER/IR 2 x 15  TE   Standing Wand IR 2 x 15     Standing Wand Scaption 2 x 15      Standing Wand Flex      Standing Wand ER/IR      Shrugs AROM       Pendulum Ex   TE         Supine Flex      S-lying ABD      S-lying ER            Prone Flexion      Prone Ext      Prone Row with ER      Prone Horizontal ABD            Standing Flex      Standing ER/IR      Standing Scaption       Standing ABD      Standing Shoulder Press       Functional activities To aid in ROM and strength needed for reaching , lifting ,pushing and pulling at home/work    ROWS: H 4/16      ROWS: M 4/16 \"    ROWS: L 4/16 \"    ER 4/16 \"    IR 4/16 \"    Punches      Shoulder Press      Shoulder Flex      Shoulder ABD            Weighted Machines      Lat Pull Down      Vertical Row      A:  Tolerated well. Above added to written HEP. P: Continue with rehab plan. If pt continues to tolerate exercises well, increase hold times for wand exercises and stretches.  Pt going to have sx on L hand thumb on 4/26/2021   Lis Soares PTA    Treatment Charges: Mins Units   Initial Evaluation     Re-Evaluation     Ther Exercise         TE 30 2   Manual Therapy     MT     Ther Activities        TA     Gait Training          GT     Neuro Re-education NR     Modalities X 15  1   Non-Billable Service Time Ice  0   Other     Total Time/Units 45 3

## 2021-03-26 ENCOUNTER — ANESTHESIA EVENT (OUTPATIENT)
Dept: OPERATING ROOM | Age: 56
End: 2021-03-26
Payer: COMMERCIAL

## 2021-03-26 ENCOUNTER — HOSPITAL ENCOUNTER (OUTPATIENT)
Dept: GENERAL RADIOLOGY | Age: 56
Discharge: HOME | End: 2021-03-28
Payer: COMMERCIAL

## 2021-03-26 ENCOUNTER — HOSPITAL ENCOUNTER (OUTPATIENT)
Age: 56
Setting detail: OUTPATIENT SURGERY
Discharge: HOME | End: 2021-03-26
Attending: ORTHOPAEDIC SURGERY | Admitting: ORTHOPAEDIC SURGERY
Payer: COMMERCIAL

## 2021-03-26 ENCOUNTER — ANESTHESIA (OUTPATIENT)
Dept: OPERATING ROOM | Age: 56
End: 2021-03-26
Payer: COMMERCIAL

## 2021-03-26 VITALS
DIASTOLIC BLOOD PRESSURE: 70 MMHG | RESPIRATION RATE: 16 BRPM | TEMPERATURE: 96.8 F | BODY MASS INDEX: 21.79 KG/M2 | OXYGEN SATURATION: 94 % | HEIGHT: 60 IN | WEIGHT: 111 LBS | SYSTOLIC BLOOD PRESSURE: 118 MMHG | HEART RATE: 61 BPM

## 2021-03-26 VITALS — DIASTOLIC BLOOD PRESSURE: 68 MMHG | SYSTOLIC BLOOD PRESSURE: 130 MMHG | TEMPERATURE: 94.3 F | OXYGEN SATURATION: 100 %

## 2021-03-26 DIAGNOSIS — Z01.818 PREOP TESTING: Primary | ICD-10-CM

## 2021-03-26 DIAGNOSIS — G89.18 POST-OP PAIN: ICD-10-CM

## 2021-03-26 DIAGNOSIS — R52 PAIN: ICD-10-CM

## 2021-03-26 PROCEDURE — 3209999900 FLUORO FOR SURGICAL PROCEDURES

## 2021-03-26 PROCEDURE — 3700000000 HC ANESTHESIA ATTENDED CARE: Performed by: ORTHOPAEDIC SURGERY

## 2021-03-26 PROCEDURE — 6360000002 HC RX W HCPCS: Performed by: NURSE ANESTHETIST, CERTIFIED REGISTERED

## 2021-03-26 PROCEDURE — 26860 FUSION OF FINGER JOINT: CPT | Performed by: ORTHOPAEDIC SURGERY

## 2021-03-26 PROCEDURE — 7100000010 HC PHASE II RECOVERY - FIRST 15 MIN: Performed by: ORTHOPAEDIC SURGERY

## 2021-03-26 PROCEDURE — 3700000001 HC ADD 15 MINUTES (ANESTHESIA): Performed by: ORTHOPAEDIC SURGERY

## 2021-03-26 PROCEDURE — 6360000002 HC RX W HCPCS: Performed by: ORTHOPAEDIC SURGERY

## 2021-03-26 PROCEDURE — 2720000010 HC SURG SUPPLY STERILE: Performed by: ORTHOPAEDIC SURGERY

## 2021-03-26 PROCEDURE — C1769 GUIDE WIRE: HCPCS | Performed by: ORTHOPAEDIC SURGERY

## 2021-03-26 PROCEDURE — 2500000003 HC RX 250 WO HCPCS: Performed by: NURSE ANESTHETIST, CERTIFIED REGISTERED

## 2021-03-26 PROCEDURE — C1713 ANCHOR/SCREW BN/BN,TIS/BN: HCPCS | Performed by: ORTHOPAEDIC SURGERY

## 2021-03-26 PROCEDURE — 7100000011 HC PHASE II RECOVERY - ADDTL 15 MIN: Performed by: ORTHOPAEDIC SURGERY

## 2021-03-26 PROCEDURE — 6370000000 HC RX 637 (ALT 250 FOR IP)

## 2021-03-26 PROCEDURE — 2709999900 HC NON-CHARGEABLE SUPPLY: Performed by: ORTHOPAEDIC SURGERY

## 2021-03-26 PROCEDURE — 6360000002 HC RX W HCPCS: Performed by: ANESTHESIOLOGY

## 2021-03-26 PROCEDURE — 20680 REMOVAL OF IMPLANT DEEP: CPT | Performed by: ORTHOPAEDIC SURGERY

## 2021-03-26 PROCEDURE — 25300 FUSION OF TENDONS AT WRIST: CPT | Performed by: ORTHOPAEDIC SURGERY

## 2021-03-26 PROCEDURE — 2580000003 HC RX 258: Performed by: ORTHOPAEDIC SURGERY

## 2021-03-26 PROCEDURE — 7100000000 HC PACU RECOVERY - FIRST 15 MIN: Performed by: ORTHOPAEDIC SURGERY

## 2021-03-26 PROCEDURE — 2500000003 HC RX 250 WO HCPCS: Performed by: ORTHOPAEDIC SURGERY

## 2021-03-26 PROCEDURE — 3600000012 HC SURGERY LEVEL 2 ADDTL 15MIN: Performed by: ORTHOPAEDIC SURGERY

## 2021-03-26 PROCEDURE — 88300 SURGICAL PATH GROSS: CPT

## 2021-03-26 PROCEDURE — 3600000002 HC SURGERY LEVEL 2 BASE: Performed by: ORTHOPAEDIC SURGERY

## 2021-03-26 PROCEDURE — 6370000000 HC RX 637 (ALT 250 FOR IP): Performed by: ANESTHESIOLOGY

## 2021-03-26 PROCEDURE — 26352 REPAIR/GRAFT HAND TENDON: CPT | Performed by: ORTHOPAEDIC SURGERY

## 2021-03-26 PROCEDURE — 7100000001 HC PACU RECOVERY - ADDTL 15 MIN: Performed by: ORTHOPAEDIC SURGERY

## 2021-03-26 DEVICE — IMPLANTABLE DEVICE: Type: IMPLANTABLE DEVICE | Site: FINGERS | Status: FUNCTIONAL

## 2021-03-26 RX ORDER — SCOLOPAMINE TRANSDERMAL SYSTEM 1 MG/1
PATCH, EXTENDED RELEASE TRANSDERMAL
Status: DISCONTINUED
Start: 2021-03-26 | End: 2021-03-26 | Stop reason: HOSPADM

## 2021-03-26 RX ORDER — FENTANYL CITRATE 50 UG/ML
INJECTION, SOLUTION INTRAMUSCULAR; INTRAVENOUS PRN
Status: DISCONTINUED | OUTPATIENT
Start: 2021-03-26 | End: 2021-03-26 | Stop reason: SDUPTHER

## 2021-03-26 RX ORDER — DEXAMETHASONE SODIUM PHOSPHATE 4 MG/ML
INJECTION, SOLUTION INTRA-ARTICULAR; INTRALESIONAL; INTRAMUSCULAR; INTRAVENOUS; SOFT TISSUE PRN
Status: DISCONTINUED | OUTPATIENT
Start: 2021-03-26 | End: 2021-03-26 | Stop reason: SDUPTHER

## 2021-03-26 RX ORDER — ONDANSETRON 2 MG/ML
INJECTION INTRAMUSCULAR; INTRAVENOUS PRN
Status: DISCONTINUED | OUTPATIENT
Start: 2021-03-26 | End: 2021-03-26 | Stop reason: SDUPTHER

## 2021-03-26 RX ORDER — BUPIVACAINE HYDROCHLORIDE AND EPINEPHRINE 5; 5 MG/ML; UG/ML
INJECTION, SOLUTION EPIDURAL; INTRACAUDAL; PERINEURAL PRN
Status: DISCONTINUED | OUTPATIENT
Start: 2021-03-26 | End: 2021-03-26 | Stop reason: ALTCHOICE

## 2021-03-26 RX ORDER — SODIUM CHLORIDE 0.9 % (FLUSH) 0.9 %
10 SYRINGE (ML) INJECTION PRN
Status: DISCONTINUED | OUTPATIENT
Start: 2021-03-26 | End: 2021-03-26 | Stop reason: HOSPADM

## 2021-03-26 RX ORDER — LIDOCAINE HYDROCHLORIDE 20 MG/ML
INJECTION, SOLUTION EPIDURAL; INFILTRATION; INTRACAUDAL; PERINEURAL PRN
Status: DISCONTINUED | OUTPATIENT
Start: 2021-03-26 | End: 2021-03-26 | Stop reason: SDUPTHER

## 2021-03-26 RX ORDER — PROMETHAZINE HYDROCHLORIDE 25 MG/ML
6.25 INJECTION, SOLUTION INTRAMUSCULAR; INTRAVENOUS
Status: DISCONTINUED | OUTPATIENT
Start: 2021-03-26 | End: 2021-03-26 | Stop reason: HOSPADM

## 2021-03-26 RX ORDER — SODIUM CHLORIDE 0.9 % (FLUSH) 0.9 %
10 SYRINGE (ML) INJECTION EVERY 12 HOURS SCHEDULED
Status: DISCONTINUED | OUTPATIENT
Start: 2021-03-26 | End: 2021-03-26 | Stop reason: HOSPADM

## 2021-03-26 RX ORDER — MIDAZOLAM HYDROCHLORIDE 1 MG/ML
INJECTION INTRAMUSCULAR; INTRAVENOUS PRN
Status: DISCONTINUED | OUTPATIENT
Start: 2021-03-26 | End: 2021-03-26 | Stop reason: SDUPTHER

## 2021-03-26 RX ORDER — SODIUM CHLORIDE 9 MG/ML
INJECTION, SOLUTION INTRAVENOUS CONTINUOUS
Status: DISCONTINUED | OUTPATIENT
Start: 2021-03-26 | End: 2021-03-26 | Stop reason: HOSPADM

## 2021-03-26 RX ORDER — PROPOFOL 10 MG/ML
INJECTION, EMULSION INTRAVENOUS PRN
Status: DISCONTINUED | OUTPATIENT
Start: 2021-03-26 | End: 2021-03-26 | Stop reason: SDUPTHER

## 2021-03-26 RX ORDER — SCOLOPAMINE TRANSDERMAL SYSTEM 1 MG/1
1 PATCH, EXTENDED RELEASE TRANSDERMAL ONCE
Status: DISCONTINUED | OUTPATIENT
Start: 2021-03-26 | End: 2021-03-26 | Stop reason: HOSPADM

## 2021-03-26 RX ORDER — OXYCODONE HYDROCHLORIDE AND ACETAMINOPHEN 5; 325 MG/1; MG/1
1 TABLET ORAL ONCE
Status: COMPLETED | OUTPATIENT
Start: 2021-03-26 | End: 2021-03-26

## 2021-03-26 RX ORDER — OXYCODONE HYDROCHLORIDE AND ACETAMINOPHEN 5; 325 MG/1; MG/1
1 TABLET ORAL EVERY 6 HOURS PRN
Qty: 28 TABLET | Refills: 0 | Status: SHIPPED | OUTPATIENT
Start: 2021-03-26 | End: 2021-04-02

## 2021-03-26 RX ADMIN — MIDAZOLAM 2 MG: 1 INJECTION INTRAMUSCULAR; INTRAVENOUS at 10:48

## 2021-03-26 RX ADMIN — HYDROMORPHONE HYDROCHLORIDE 0.5 MG: 1 INJECTION, SOLUTION INTRAMUSCULAR; INTRAVENOUS; SUBCUTANEOUS at 13:09

## 2021-03-26 RX ADMIN — FENTANYL CITRATE 50 MCG: 50 INJECTION, SOLUTION INTRAMUSCULAR; INTRAVENOUS at 11:06

## 2021-03-26 RX ADMIN — FENTANYL CITRATE 50 MCG: 50 INJECTION, SOLUTION INTRAMUSCULAR; INTRAVENOUS at 11:31

## 2021-03-26 RX ADMIN — HYDROMORPHONE HYDROCHLORIDE 0.5 MG: 1 INJECTION, SOLUTION INTRAMUSCULAR; INTRAVENOUS; SUBCUTANEOUS at 13:18

## 2021-03-26 RX ADMIN — DEXAMETHASONE SODIUM PHOSPHATE 10 MG: 4 INJECTION, SOLUTION INTRAMUSCULAR; INTRAVENOUS at 11:05

## 2021-03-26 RX ADMIN — ONDANSETRON 4 MG: 2 INJECTION INTRAMUSCULAR; INTRAVENOUS at 12:07

## 2021-03-26 RX ADMIN — PROPOFOL 200 MG: 10 INJECTION, EMULSION INTRAVENOUS at 11:01

## 2021-03-26 RX ADMIN — LIDOCAINE HYDROCHLORIDE 80 MG: 20 INJECTION, SOLUTION EPIDURAL; INFILTRATION; INTRACAUDAL; PERINEURAL at 11:01

## 2021-03-26 RX ADMIN — OXYCODONE HYDROCHLORIDE AND ACETAMINOPHEN 1 TABLET: 5; 325 TABLET ORAL at 14:06

## 2021-03-26 RX ADMIN — SODIUM CHLORIDE: 9 INJECTION, SOLUTION INTRAVENOUS at 11:49

## 2021-03-26 RX ADMIN — HYDROMORPHONE HYDROCHLORIDE 0.5 MG: 1 INJECTION, SOLUTION INTRAMUSCULAR; INTRAVENOUS; SUBCUTANEOUS at 13:23

## 2021-03-26 RX ADMIN — HYDROMORPHONE HYDROCHLORIDE 0.5 MG: 1 INJECTION, SOLUTION INTRAMUSCULAR; INTRAVENOUS; SUBCUTANEOUS at 13:36

## 2021-03-26 RX ADMIN — SODIUM CHLORIDE: 9 INJECTION, SOLUTION INTRAVENOUS at 10:48

## 2021-03-26 RX ADMIN — FENTANYL CITRATE 50 MCG: 50 INJECTION, SOLUTION INTRAMUSCULAR; INTRAVENOUS at 11:57

## 2021-03-26 RX ADMIN — Medication 2 G: at 10:51

## 2021-03-26 ASSESSMENT — PULMONARY FUNCTION TESTS
PIF_VALUE: 13
PIF_VALUE: 2
PIF_VALUE: 2
PIF_VALUE: 13
PIF_VALUE: 3
PIF_VALUE: 2
PIF_VALUE: 13
PIF_VALUE: 13
PIF_VALUE: 2
PIF_VALUE: 13
PIF_VALUE: 2
PIF_VALUE: 13
PIF_VALUE: 13
PIF_VALUE: 2
PIF_VALUE: 13
PIF_VALUE: 13
PIF_VALUE: 2
PIF_VALUE: 13
PIF_VALUE: 15
PIF_VALUE: 13
PIF_VALUE: 2
PIF_VALUE: 0
PIF_VALUE: 2
PIF_VALUE: 2
PIF_VALUE: 13
PIF_VALUE: 2
PIF_VALUE: 2
PIF_VALUE: 15
PIF_VALUE: 13
PIF_VALUE: 2
PIF_VALUE: 13
PIF_VALUE: 11
PIF_VALUE: 13
PIF_VALUE: 3
PIF_VALUE: 11
PIF_VALUE: 2
PIF_VALUE: 4
PIF_VALUE: 2
PIF_VALUE: 13
PIF_VALUE: 17
PIF_VALUE: 2
PIF_VALUE: 2
PIF_VALUE: 1
PIF_VALUE: 2
PIF_VALUE: 13
PIF_VALUE: 13
PIF_VALUE: 11
PIF_VALUE: 2
PIF_VALUE: 11
PIF_VALUE: 13
PIF_VALUE: 13
PIF_VALUE: 0
PIF_VALUE: 13
PIF_VALUE: 1
PIF_VALUE: 2
PIF_VALUE: 13

## 2021-03-26 ASSESSMENT — PAIN SCALES - GENERAL
PAINLEVEL_OUTOF10: 8
PAINLEVEL_OUTOF10: 7
PAINLEVEL_OUTOF10: 7
PAINLEVEL_OUTOF10: 8

## 2021-03-26 ASSESSMENT — PAIN DESCRIPTION - LOCATION: LOCATION: WRIST

## 2021-03-26 ASSESSMENT — PAIN SCALES - WONG BAKER: WONGBAKER_NUMERICALRESPONSE: 2

## 2021-03-26 ASSESSMENT — PAIN DESCRIPTION - DESCRIPTORS: DESCRIPTORS: ACHING

## 2021-03-26 ASSESSMENT — PAIN DESCRIPTION - ORIENTATION: ORIENTATION: LEFT

## 2021-03-26 NOTE — ANESTHESIA POSTPROCEDURE EVALUATION
Department of Anesthesiology  Postprocedure Note    Patient: Sha Leonardo  MRN: [id number]  YOB: 1965  Date of evaluation: 3/26/2021  Time:  1:49 PM     Procedure Summary     Date: 03/26/21 Room / Location: [facility] OR [address] [place] [address]    Anesthesia Start: 1179 Anesthesia Stop: 1300    Procedures:       LEFT WRIST HARDWARE REMOVAL (Left )      FLEXOR TENOLYSIS LEFT THUMB FLEXOR TENDON RECONSTRUCTION WITH AUTOGRAFT  TENDON TRANSFER   ++SHAUN++ (Left ) Diagnosis: (LEFT THUMB [facility] RUPTURE  [facility] OF HAND)    Surgeons: Floyd Kelly MD Responsible Provider: Gabby Potter MD    Anesthesia Type: general ASA Status: 2          Anesthesia Type: general    Paul Phase I: Paul Score: 10    Paul Phase II:      Last vitals: Reviewed and per EMR flowsheets.        Anesthesia Post Evaluation    Patient location during evaluation: PACU  Patient participation: complete - patient participated  Level of consciousness: awake and alert  Pain score: 3  Airway patency: patent  Nausea & Vomiting: no vomiting and no nausea  Complications: no  Cardiovascular status: hemodynamically stable  Respiratory status: spontaneous ventilation  Hydration status: stable

## 2021-03-26 NOTE — ANESTHESIA PRE PROCEDURE
Department of Anesthesiology  Preprocedure Note       Name:  Warden Finn   Age:  60 y.o.  :  1965                                          MRN:  [id number]         Date:  3/26/2021      Surgeon: Meilda Dill):  Boneta Simmonds, MD    Procedure: Procedure(s):  LEFT WRIST HARDWARE REMOVAL  FLEXOR TENOLYSIS LEFT THUMB FLEXOR TENDON RECONSTRUCTION WITH AUTOGRAFT POSSIBLE TENDON TRANSFER   ++SHAUN++    Medications prior to admission:   Prior to Admission medications    Medication Sig Start Date End Date Taking? Authorizing Provider   oxyCODONE-acetaminophen (PERCOCET) 5-325 MG per tablet Take 1 tablet by mouth every 6 hours as needed for Pain for up to 7 days. Intended supply: 7 days. Take lowest dose possible to manage pain 3/26/21 4/2/21  Eusebio Rhodes DO   doxepin (SINEQUAN) 10 MG capsule TAKE 1 CAPSULE BY MOUTH TWO TIMES DAILY  Patient taking differently: nightly  10/15/20   Wendy Duncan DO   ibuprofen (ADVIL;MOTRIN) 800 MG tablet Take 1 tablet by mouth every 6 hours as needed for Pain 20   Tamie Vieira APRN - CNP   Multiple Vitamins-Minerals (HAIR SKIN AND NAILS FORMULA PO) Take 1 tablet by mouth daily    Historical Provider, MD       Current medications:    No current facility-administered medications for this visit. Current Outpatient Medications   Medication Sig Dispense Refill    oxyCODONE-acetaminophen (PERCOCET) 5-325 MG per tablet Take 1 tablet by mouth every 6 hours as needed for Pain for up to 7 days. Intended supply: 7 days.  Take lowest dose possible to manage pain 28 tablet 0     Facility-Administered Medications Ordered in Other Visits   Medication Dose Route Frequency Provider Last Rate Last Admin    0.9 % sodium chloride infusion   Intravenous Continuous Boneta Simmonds, MD        ceFAZolin (ANCEF) 2000 mg in sterile water 20 mL IV syringe  2,000 mg Intravenous On Call to Wilian Heller MD        sodium chloride flush 0.9 % injection 10 mL  10 mL Intravenous 2 times per day Kit Samuel MD        sodium chloride flush 0.9 % injection 10 mL  10 mL Intravenous PRN Kit Samuel MD           Allergies: Allergies   Allergen Reactions    Vicodin [Hydrocodone-Acetaminophen] Nausea And Vomiting       Problem List:    Patient Active Problem List   Diagnosis Code    Wrist pain M25.539    Distal radius fracture S52.509A    Degenerative arthritis of thumb M18.10    Arthritis of right hand M19.041    Arthritis of left hand M19.042    Traumatic complete tear of right rotator cuff S46.011A    Impingement syndrome of right shoulder M75.41    Tear of right glenoid labrum S43.431A    Biceps tendonitis on right M75.21       Past Medical History:        Diagnosis Date    Depression     Hyperlipidemia     slight not on meds    OA (osteoarthritis) of finger     hands    PONV (postoperative nausea and vomiting)     with anesthesia       Past Surgical History:        Procedure Laterality Date    CHOLECYSTECTOMY  10/2007    COLONOSCOPY  08/09/2011    [facility]    FRACTURE SURGERY  aug 2012    open reduction internal fixation left wrist    SHOULDER ARTHROSCOPY Right 1/22/2021    RIGHT SHOULDER ARTHROSCOPY, SUBACROMIAL DECOMPRESSION, ROTATOR CUFF REPAIR AND DEBRIDEMENT ( [facility]) performed by Shelley Estrada DO at [address]  08/09/2011       Social History:    Social History     Tobacco Use    Smoking status: Never Smoker    Smokeless tobacco: Never Used   Substance Use Topics    Alcohol use: Yes     Comment: rarely                                Counseling given: Not Answered      Vital Signs (Current): There were no vitals filed for this visit.                                            BP Readings from Last 3 Encounters:   03/26/21 111/69   01/22/21 (!) 146/89   01/22/21 (!) 155/78       NPO Status:                                                                                 BMI:   Wt Readings from Last 3 Encounters: 03/26/21 111 lb (50.3 kg)   03/18/21 110 lb (49.9 kg)   03/09/21 108 lb (49 kg)     There is no height or weight on file to calculate BMI.    CBC:   Lab Results   Component Value Date    WBC 4.6 09/18/2020    RBC 3.70 09/18/2020    HGB 10.9 09/18/2020    HCT 34.1 09/18/2020    MCV 92.2 09/18/2020    RDW 12.6 09/18/2020     09/18/2020       CMP:   Lab Results   Component Value Date     09/18/2020    K 4.8 09/18/2020     09/18/2020    CO2 26 09/18/2020    BUN 17 09/18/2020    CREATININE 1.2 09/18/2020    GFRAA 56 09/18/2020    LABGLOM 46 09/18/2020    GLUCOSE 88 09/18/2020    PROT 7.3 09/18/2020    CALCIUM 9.9 09/18/2020    BILITOT 0.4 09/18/2020    ALKPHOS 93 09/18/2020    AST 33 09/18/2020    ALT 23 09/18/2020       POC Tests: No results for input(s): POCGLU, POCNA, POCK, POCCL, POCBUN, POCHEMO, POCHCT in the last 72 hours. Coags: No results found for: PROTIME, INR, APTT    HCG (If Applicable):   Lab Results   Component Value Date    PREGTESTUR NEGATIVE 08/08/2011    PREGSERUM NEGATIVE 08/22/2012        ABGs: No results found for: PHART, PO2ART, ZLR4OAJ, WDU3POA, BEART, E4RURYHY     Type & Screen (If Applicable):  No results found for: LABABO, LABRH    Drug/Infectious Status (If Applicable):  No results found for: HIV, HEPCAB    COVID-19 Screening (If Applicable):   Lab Results   Component Value Date    COVID19 Not Detected 03/22/2021         Anesthesia Evaluation  Patient summary reviewed   history of anesthetic complications: PONV.   Airway: Mallampati: II  TM distance: >3 FB   Neck ROM: full  Mouth opening: > = 3 FB Dental: normal exam         Pulmonary:Negative Pulmonary ROS breath sounds clear to auscultation                             Cardiovascular:Negative CV ROS    (+) hyperlipidemia        Rhythm: regular  Rate: normal                    Neuro/Psych:   Negative Neuro/Psych ROS              GI/Hepatic/Renal: Neg GI/Hepatic/Renal ROS            Endo/Other: Negative Endo/Other ROS (+) : arthritis:., .                 Abdominal:       Abdomen: soft. Vascular:                                          Anesthesia Plan      general     ASA 2       Induction: intravenous. MIPS: Postoperative opioids intended and Prophylactic antiemetics administered. Anesthetic plan and risks discussed with patient and spouse.       Plan discussed with CRNA and surgical team.          PAT Chart Review:  Chart reviewed per routine on March 26, 2021 at 10:01 AM by Bea Jolley MD.  (Final assessment and plan per day of surgery team.)      Bea Jolley MD   3/26/2021

## 2021-03-26 NOTE — PROGRESS NOTES
Went over discharge instructions with patient and . Both  verbalized understanding of information.  went to go and get scripts and then the car.

## 2021-03-26 NOTE — ANESTHESIA POSTPROCEDURE EVALUATION
Department of Anesthesiology  Postprocedure Note    Patient: Kimi Penn  MRN: [id number]  YOB: 1965  Date of evaluation: 3/26/2021  Time:  3:23 PM     Procedure Summary     Date: 03/26/21 Room / Location: [facility] OR [address] [place] [address]    Anesthesia Start: 6977 Anesthesia Stop: 1300    Procedures:       LEFT WRIST HARDWARE REMOVAL (Left )      FLEXOR TENOLYSIS LEFT THUMB FLEXOR TENDON RECONSTRUCTION WITH AUTOGRAFT  TENDON TRANSFER   ++SHAUN++ (Left ) Diagnosis: (LEFT THUMB [facility] RUPTURE  [facility] OF HAND)    Surgeons: Twin Muñiz MD Responsible Provider: Clemente Mcmahon MD    Anesthesia Type: general ASA Status: 2          Anesthesia Type: general    Paul Phase I: Paul Score: 10    Paul Phase II:      Last vitals: Reviewed and per EMR flowsheets.        Anesthesia Post Evaluation    Patient location during evaluation: PACU  Patient participation: complete - patient participated  Level of consciousness: awake and alert  Pain score: 4  Airway patency: patent  Nausea & Vomiting: no vomiting and no nausea  Complications: no  Cardiovascular status: hemodynamically stable  Respiratory status: spontaneous ventilation  Hydration status: stable

## 2021-03-26 NOTE — BRIEF OP NOTE
Brief Postoperative Note      Patient: Janes Okeefe  YOB: 1965  MRN: [id number]    Date of Procedure: 3/26/2021    Pre-Op Diagnosis: LEFT THUMB [facility] RUPTURE OF FLEXORTENDON OF HAND    Post-Op Diagnosis: Same       Procedure(s):  LEFT WRIST HARDWARE REMOVAL  FLEXOR TENOLYSIS LEFT THUMB FLEXOR TENDON RECONSTRUCTION WITH AUTOGRAFT  TENDON TRANSFER   ++SHAUN++    Surgeon(s):  Emerson Santoyo MD    Assistant:  Resident: Orlando Lux DO    Anesthesia: General    Estimated Blood Loss (mL): less than 50     Complications: None    Specimens:   ID Type Source Tests Collected by Time Destination   A : LEFT WRIST HARDWARE  Hardware Hardware SURGICAL PATHOLOGY Emerson Santoyo MD 3/26/2021 1217        Implants:  Implant Name Type Inv.  Item Serial No.  Lot No. LRB No. Used Action   SCREW BNE SHT THRD 2X24 MM 4 MM [employer] HDLSS TI  SCREW BNE SHT THRD 2X24 MM 4 MM [employer] HDLSS TI  [employer]  Left 1 Implanted         Drains: * No LDAs found *    Findings: see op note    Electronically signed by Orlando Lux DO on 3/26/2021 at 12:52 PM

## 2021-03-26 NOTE — H&P
Department of Orthopedic Surgery  History and Physical        CHIEF COMPLAINT: Left thumb pain     HISTORY OF PRESENT ILLNESS:                 The patient is a 60 y.o. right-hand-dominant female who presents with left thumb pain which began 2 weeks prior. Patient states she was attempting to turn on her vacuum  when her thumb jammed against a button. She felt immediate jolt in her thumb and was unable to flex her thumb at the IP joint afterwards. She was evaluated by Dr. Justino Nguyen and he subsequently referred patient to the office for evaluation. Since the injury, patient has been unable to flex her thumb. She states it has been getting in the way with her daily activities. She does work from home on a computer. Patient does have a history significant for left wrist fracture repaired in 2012. She denies any numbness or paresthesias in the left hand. She does have underlying arthritis in the left hand with limited mobility at several joints.   Patient has been taking ibuprofen with minimal relief.     Past Medical History:    Past Medical History             Diagnosis Date    Chronic idiopathic urticaria 02/2014    Hyperlipidemia       slight not on meds    Nausea & vomiting      OA (osteoarthritis) of finger      PONV (postoperative nausea and vomiting)       with anesthesia         Past Surgical History:    Past Surgical History             Procedure Laterality Date    CHOLECYSTECTOMY   10/2007    COLONOSCOPY   08/09/2011     DODIG    FRACTURE SURGERY   aug 2012     open reduction internal fixation left wrist    SHOULDER ARTHROSCOPY Right 01/21/2021    SHOULDER ARTHROSCOPY Right 1/22/2021     RIGHT SHOULDER ARTHROSCOPY, SUBACROMIAL DECOMPRESSION, ROTATOR CUFF REPAIR AND DEBRIDEMENT ([address]) performed by Nehal Horton DO at [address]   08/09/2011         Current Medications:   Current Hospital Medications   No current facility-administered medications for this visit. Allergies:  Vicodin [hydrocodone-acetaminophen]     Social History:   TOBACCO:   reports that she has never smoked. She has never used smokeless tobacco.  ETOH:   reports current alcohol use. DRUGS:   reports no history of drug use. ACTIVITIES OF DAILY LIVING:    OCCUPATION:    Family History:   Family History             Problem Relation Age of Onset    Stroke Mother      Colon Cancer Father      Other Father      Cancer Sister           Breast    Other Sister              REVIEW OF SYSTEMS:  CONSTITUTIONAL:  negative  HEENT:  negative  RESPIRATORY:  negative  CARDIOVASCULAR:  negative  GASTROINTESTINAL:  negative  HEMATOLOGIC/LYMPHATIC:  negative  MUSCULOSKELETAL: Positive for left thumb pain  NEUROLOGICAL:  Negative for numbness  BEHAVIOR/PSYCH:  negative     PHYSICAL EXAM:    VITALS:  Resp 18   Ht 5' (1.524 m)   Wt 110 lb (49.9 kg)   LMP 08/13/2012   BMI 21.48 kg/m²   CONSTITUTIONAL:  awake, alert, cooperative, no apparent distress, and appears stated age  EYES:  Lids and lashes normal, pupils equal, round and reactive to light, extra ocular muscles intact, sclera clear, conjunctiva normal  ENT:  Normocephalic, without obvious abnormality, atraumatic, sinuses nontender on palpation, external ears without lesions, oral pharynx with moist mucus membranes, tonsils without erythema or exudates, gums normal and good dentition. NECK:  Supple, symmetrical, trachea midline, no adenopathy, thyroid symmetric, not enlarged and no tenderness, skin normal  LUNGS:  CTA  CARDIOVASCULAR:  2+ radial pulses, extremities warm and well perfused  ABDOMEN:  NTTP  CHEST:  Atraumatic   GENITAL/URINARY:  deferred  NEUROLOGIC:  Awake, alert, oriented to name, place and time. Cranial nerves II-XII are grossly intact. Motor is 5 out of 5 bilaterally.   Sensory is intact.  gait is normal.  MUSCULOSKELETAL:     Left upper extremity:  · Skin intact circumferentially  · Deformity noted to several joints of the left hand  · Prior volar scar over the distal forearm  · Left thumb hyperextended at the IP joint  · Unable to actively flex at the left thumb IP joint  · Patient with active extension at the left IP joint  · Minimal tenderness about the left thumb CMC joint with mildly positive CMC grind test  · Compartments soft and compressible  · +AIN/PIN/Ulnar nerve function intact grossly  · +Radial pulse, Brisk Cap refill, hand warm and perfused  · Sensation intact to touch in radial/ulnar/median nerve distributions to hand           DATA:    CBC:         Lab Results   Component Value Date     WBC 4.6 09/18/2020     RBC 3.70 09/18/2020     HGB 10.9 09/18/2020     HCT 34.1 09/18/2020     MCV 92.2 09/18/2020     MCH 29.5 09/18/2020     MCHC 32.0 09/18/2020     RDW 12.6 09/18/2020      09/18/2020     MPV 10.4 09/18/2020      PT/INR:  No results found for: PROTIME, INR     Radiology Review:       3 views of the left hand including PA, lateral, oblique reviewed from 3/9/2021 demonstrating prior volar distal radius plate. Plate margin past watershed line. 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 Based on history, exam, and imaging it is suspected that patient has ruptured FPL tendon of left thumb as well as history of index profundus. There is concern that the prior volar distal radius plate and fixation may have contributed to rupture. We discussed treatment options at today's encounter. Patient will require left wrist hardware removal, flexor tenolysis, left thumb flexor tendon reconstruction with autograft, possible tendon transfer. Patient was educated about condition and would like to proceed with surgical management. In regards to patient's left [facility] joint arthritis, it is currently not bothersome to patient.     I explained the risks, benefits, alternatives and complications of surgery with the patient including but not limited to the risks of infection, possible damage to nerves, vessels, or tendons, stiffness, loss of range of motion, scar sensitivity, wound healing complications, worsening symptoms, possible need for therapy, as well as the possible need further surgery and unanticipated complications. The patient voiced understanding and all questions were answered. The patient elected to proceed with surgical intervention.      I have seen and evaluated the patient and agree with the above assessment and plan on today's visit. I have performed the key components of the history and physical examination with significant findings of attritional rupture of thumb FPL tendon with flexor tenosynovitis and retained volar plate of the distal radius. Patient also demonstrates history of rupture of the index profundus on that same hand. Plan for flexor tenolysis with plate removal and reconstruction of thumb flexor tendon. Discussed autograft harvest and donor site morbidity in addition to the risk of refracture and stiffness and rupture of the reconstruction. I concur with the findings and plan as documented. The patient was counseled at length about the risks of patricia Covid-19 during their perioperative period and any recovery window from their procedure. The patient was made aware that patricia Covid-19  may worsen their prognosis for recovering from their procedure  and lend to a higher morbidity and/or mortality risk. All material risks, benefits, and reasonable alternatives including postponing the procedure were discussed. The patient does wish to proceed with the procedure at this time. History and Physical Update     Patient was seen and examined.  Patient's history and physical was reviewed with the patient. There has been no significant interval changes.

## 2021-03-28 NOTE — OP NOTE
[address] [address]                                OPERATIVE REPORT    PATIENT NAME: Tierra Noble                    :        1965  MED REC NO:   [id number]                            ROOM:  ACCOUNT NO:   [de-identified]                           ADMIT DATE: 2021  PROVIDER:     Maribell Otoole MD    DATE OF PROCEDURE:  2021    PREOPERATIVE DIAGNOSES:  1. Left middle finger DIP joint arthritis. 2.  Left symptomatic wrist hardware. 3.  Left index finger profundus rupture. 4.  Left thumb profundus rupture. 5.  Partial tendon rupture of middle finger profundus. POSTOPERATIVE DIAGNOSES:  1. Left middle finger DIP joint arthritis. 2.  Left symptomatic wrist hardware. 3.  Left index finger profundus rupture. 4.  Left thumb profundus rupture. 5.  Partial tendon rupture of middle finger profundus. PROCEDURES PERFORMED:  1. Left middle finger DIP joint fusion using a Synthes CCHS compression  screw. 2.  Left wrist removal of deep retained hardware. 3.  Left forearm flexor tenolysis and excision of scar tissue. 4.  Left thumb flexor pollicis longus tendon reconstruction with the use  of interposition graft. 5.  Gipsy of extensor indicis proprius tendon for flexor tendon  reconstruction. 6.  Debridement of partial attritional rupture of middle finger  profundus tendon at the level of the distal forearm. SURGEON:  Maribell Otoole M.D. ANESTHESIA:  1. General.  2.  Local anesthetic by surgeon consisting of approximately 10 mL of  0.25% Marcaine plain and 20 mL of 0.25% Marcaine with epinephrine. SPECIMENS:  Hardware was sent for pathology, gross examination only. TOTAL TOURNIQUET TIME:  Approximately one hour and 30 minutes. ESTIMATED BLOOD LOSS:  Minimal.    FINDINGS:  1.  Evidence of severe arthritis involving the DIP joint of the middle  finger.   2.  Status post contouring and debridement, the middle finger DIP joint  was fused such that the alignment was appropriate relative to the  adjacent index and ring fingers in flexion and stable to gentle stress  examination. 3.  Evidence of complete rupture of the profundus tendon of the index  finger as well as complete rupture of the flexor pollicis longus and a  partial rupture of the middle finger profundus tendon. 4.  Status post excisional debridement and plate removal, the flexor  tendon was reconstructed with an interposition graft from the extensor  indicis proprius, which was tied into place at the level of the thumb  metacarpophalangeal joint shuttled through the carpal tunnel and tension  placed proximally in the muscle belly of the flexor pollicis longus,  which was healthy and viable such that with wrist extension, the thumb  IP joint flexed and with wrist flexion, the thumb could be passively  extended. DISPOSITION:  The patient remained stable throughout the procedure. OPERATIVE INDICATIONS:  The patient is a 57-year-old female, who had  previously undergone distal radius fracture and developed an attritional  rupture of the index finger, which she tolerated, but most recently she  developed an attritional rupture of the flexor pollicis longus of the  thumb, which she found intolerable. After discussion of the risks,  benefits, alternatives, and complication of surgery, she wished to  proceed with surgical intervention. Risks included, but not limited to  risk of infection, damage to nerves, vessels, or tendons, re-fracture,  failure to improve her thumb function, a re-rupture of the tendon  transfer, and donor site morbidity of the tendon graft. She voiced  understanding and wished to proceed. On the day of surgery, she wished  to have for middle finger DIP joint arthritis addressed as well and  wished to proceed with a fusion.   Risks of this procedure included  infection, damage to nerves, vessels, or tendons, malunion, nonunion,  symptomatic hardware, loss of range of motion to the DIP joint of the  middle finger, and possible need for additional surgery. She understood  and wished to proceed. DESCRIPTION OF PROCEDURE:  The patient was identified in the holding  area. The left wrist was identified as the surgical site. She was then  seen by Anesthesia, taken to the operating room, placed supine on the  table, and underwent general anesthesia per the Anesthesia Department. All bony prominences were well padded. A well-padded arm tourniquet was  placed. The left upper extremity was prepared and draped in the  standard sterile fashion. Arm was exsanguinated. Tourniquet inflated  to 250 mmHg. Total tourniquet time was approximately one hour and 30  minutes. Attention was first directed towards the middle finger DIP joint. A  transverse incision over the DIP joint of the middle finger was then  created and extended dorsally and distally and dorsal radial and ulnar  skin flaps were elevated, joint hyperflexed. There were severe  mutilating arthritis present. This was contoured down such that in  extension and radial deviation was fully corrected and had a good  alignment relative to the adjacent middle finger as well as neutral  alignment on the lateral.  There was curvature to the middle phalanx of  the middle finger and this was undertaken while placement of retrograde  guidewire for a Synthes CCHS screw, which was placed under fluoroscopy  maintaining the reduction in both the AP and lateral planes. Appropriate length screw was then drilled and inserted. Excellent  compression across the fusion site was achieved.   Slight residual radial  deviation was undertaken, but this was corrected with slight rotation of  the distal phalanx, which maintained excellent alignment in both the AP  and lateral views as well as leticia and alignment on clinical exam.   With this confirmed and excellent compression achieved and good  stability achieved with the compression screw, which was confirmed in  placement in both AP and lateral planes under fluoroscopy, the wound was  copiously irrigated out and the skin flaps repaired with 5-0 nylon  suture. A volar incision over the distal radius through a previous scar was then  created. Flaps were elevated. The FCR tendon was retracted. Blunt  dissection was used to identify the median nerve as well as the palmar  cutaneous branch of the median nerves, which were protected and  reflected ulnarly. Beneath this was severe attritional scar tissue with  complete rupture of the index profundus and there was a 50% rupture of  the adjacent middle finger profundus. This was debrided to stable  margins. The remaining tendon was healthy and viable and no further  intervention was taken to the middle finger profundus. These tendons  were retracted in bulk. At the proximal muscle belly, the flexor  pollicis longus was identified. This was healthy and viable. There was  complete rupture of the flexor pollicis longus tendon at the level of  the retained hardware. This was at the level of the watershed, which  had a prominent hardware. With this confirmed, the hardware was then removed using appropriate  screwdrivers. Distal screws and proximal screws were removed without  complication. Plate was elevated up and the hardware removed. Fluoroscopy was used to confirm removal of the hardware as well as  healing of the previous fracture. The screw sites were gently debrided  with a rongeur to smooth margins. An incision was then made over the flexor surface of the thumb at the  MCP joint. Flaps were elevated. The radial and ulnar neurovascular  bundles were identified and retracted. The tendon was brought out at  this level, there was a large attritional rupture of the flexor pollicis  longus. This was debrided to healthy tendon portion.   With this  completed, a similar process was performed proximally at the flexor  pollicis longus muscle belly. Given the muscle belly was healthy and  viable and after previous discussion with the patient, an interposition  graft was undertaken versus a tendon transfer. The extensor indicis proprius tendon was then harvested through an  incision over the index MCP joint. Flaps were elevated. The ulnar slip  was harvested at this level. An incision was made just proximal to the extensor retinaculum dorsally  followed by blunt dissection and identifying the extensor mechanism at  this level. The extensor indicis proprius myotendinous origin was  identified and the tendon retracted at this level through the extensor  retinaculum. At the myotendinous origin, the tendon was harvested. The  donor sites were copiously irrigated out. Local anesthetic infiltrated. Skin over the index finger was repaired with nylon suture and over the  dorsum of the wrist was closed with 2-0 Vicryl and 3-0 Monocryl. The graft was then sewn into place distally at the distal stump at the  level of the thumb metacarpal and metacarpophalangeal joint. A  three-weave Pulvertaft weave was achieved and secured in place with  multiple 4-0 looped FiberWires with good secure fixation achieved. A  pediatric feeding tube was then used to shuttle the tendon through the  thenar musculature through the carpal tunnel, which was then brought out  at the level of the forearm. Excellent excursion with gentle traction  was achieved at the thumb IP joint. With the wrist neutral and the  thumb IP joint in flexion and appropriate tension on the proximal  tendon, a Pulvertaft weave was then placed.   A set suture was placed  first such that with wrist extension, the thumb IP joint flexed and with  wrist flexion, the thumb can be brought out to neutral.  With this  confirmed, a three-weave Pulvertaft weave was undertaken and secured in  place with multiple 4-0 looped FiberWire sutures. Excellent anastomoses  were achieved proximally and distally. The wounds were copiously  irrigated out. Tourniquet was deflated. Hemostasis was achieved with  bipolar cautery. Skin closed with multiple nylon sutures. Sterile  dressing was applied with the wrist in neutral, thumb and IP joint in  flexion, and a bulky soft sterile dressing applied to the index finger. The patient was in stable condition and was taken to the recovery room.         Monica Toscano MD    D: 03/27/2021 11:21:09       T: 03/27/2021 11:32:12     [id number]  Job#: [id number]     Doc#: [id number]    CC:

## 2021-03-31 ENCOUNTER — TELEPHONE (OUTPATIENT)
Dept: PHYSICAL THERAPY | Age: 56
End: 2021-03-31

## 2021-03-31 ENCOUNTER — TELEPHONE (OUTPATIENT)
Dept: ORTHOPEDIC SURGERY | Age: 56
End: 2021-03-31

## 2021-03-31 DIAGNOSIS — M18.12 ARTHRITIS OF CARPOMETACARPAL (CMC) JOINT OF LEFT THUMB: ICD-10-CM

## 2021-03-31 DIAGNOSIS — M79.642 LEFT HAND PAIN: ICD-10-CM

## 2021-03-31 DIAGNOSIS — S66.819A: Primary | ICD-10-CM

## 2021-04-01 ENCOUNTER — TELEPHONE (OUTPATIENT)
Dept: ORTHOPEDIC SURGERY | Age: 56
End: 2021-04-01

## 2021-04-01 ENCOUNTER — OFFICE VISIT (OUTPATIENT)
Dept: ORTHOPEDIC SURGERY | Age: 56
End: 2021-04-01

## 2021-04-01 VITALS — RESPIRATION RATE: 20 BRPM | HEIGHT: 60 IN | WEIGHT: 110 LBS | BODY MASS INDEX: 21.6 KG/M2

## 2021-04-01 DIAGNOSIS — S66.819A: Primary | ICD-10-CM

## 2021-04-01 PROCEDURE — 99024 POSTOP FOLLOW-UP VISIT: CPT | Performed by: ORTHOPAEDIC SURGERY

## 2021-04-01 RX ORDER — HYDROCODONE BITARTRATE AND ACETAMINOPHEN 7.5; 325 MG/1; MG/1
1 TABLET ORAL EVERY 6 HOURS PRN
Qty: 28 TABLET | Refills: 0 | Status: SHIPPED
Start: 2021-04-01 | End: 2021-04-27 | Stop reason: SDUPTHER

## 2021-04-01 NOTE — PROGRESS NOTES
6 days postop left wrist hardware removal with FPL tendon reconstruction with EIP tendon. She also had a DIP joint fusion to the middle finger. She is having difficulty as she is still recovering from her right shoulder in addition to her left hand surgery. She is requesting Norco 7.5 to help control her pain    His exam: Thumb held in flexion. Able to passively extend the thumb IP joint to neutral.  No signs of infection. Remains neurovascular intact. X-rays the office today: AP lateral obliques of the left wrist were obtained demonstrating removal of the previous plate and screws. There is stable fusion mass to the DIP joint of the middle finger. No new fracture dislocations. Impression office x-rays: Interval removal of left distal radius hardware. DIP joint fusion of middle finger. No new fractures or dislocations. Assessment: 6 days postop    Plan    Patient was fitted with a removable splint to the tendon transfer. Patient will start therapy for active extension passive flexion exercises may incorporate active flexion extension at 4 weeks postop. She will follow-up in 3 to 4 weeks for reevaluation. Follow-up next week for suture removal with nursing. Informed consent was obtained for continued opioid treatment. Patient agrees to continue the current treatment and agrees the benefits of opioid treatment ( decreased pain, improved function) continue to outweigh the potential risks ( confusion, change in thinking ability, depression, dry mouth, nausea, constipation, vomiting, impaired coordination/balance, sleepiness, damage liver or kidneys, opioid-induced hyperalgesia, physical dependence, addiction, withdrawal, respiratory depression and even death).

## 2021-04-01 NOTE — TELEPHONE ENCOUNTER
That's fine, but double check who is keeping her off as dr Malcolm Cruz just did surgery too on her thumb on 3/26/2021

## 2021-04-01 NOTE — TELEPHONE ENCOUNTER
Pt called and wanted to know if she could continue doing half days at work for a few more weeks.  Before she goes back to full days

## 2021-04-05 ENCOUNTER — TREATMENT (OUTPATIENT)
Dept: PHYSICAL THERAPY | Age: 56
End: 2021-04-05
Payer: COMMERCIAL

## 2021-04-05 DIAGNOSIS — S46.011A TRAUMATIC COMPLETE TEAR OF RIGHT ROTATOR CUFF, INITIAL ENCOUNTER: Primary | ICD-10-CM

## 2021-04-05 PROCEDURE — 97110 THERAPEUTIC EXERCISES: CPT

## 2021-04-05 PROCEDURE — 97140 MANUAL THERAPY 1/> REGIONS: CPT

## 2021-04-05 PROCEDURE — G0283 ELEC STIM OTHER THAN WOUND: HCPCS

## 2021-04-05 NOTE — PROGRESS NOTES
, lifting ,pushing and pulling at home/work    ROWS: H 4/16      ROWS: M 4/16 \"    ROWS: L 4/16 \"    ER 4/16 \"    IR 4/16 \"    Punches      Shoulder Press      Shoulder Flex      Shoulder ABD            Weighted Machines      Lat Pull Down      Vertical Row      A:  Tolerated well. Pt now in L wrist splint due to L hand/tumb resent sx  Since last rx session. Pt only able to use R arm for  stretches  to tolerance now. Emphasis on self and manual stretches along with modalities end of rx session. P: Continue with rehab plan.  Rj shoulder strengthening allowed 4/16/2021   Elieser Marrero PTA    Treatment Charges: Mins Units   Initial Evaluation     Re-Evaluation     Ther Exercise         TE 20 1   Manual Therapy     MT 10 1   Ther Activities        TA     Gait Training          GT     Neuro Re-education NR     Modalities X 15  1   Non-Billable Service Time Ice  0   Other     Total Time/Units 45 3

## 2021-04-06 ENCOUNTER — NURSE ONLY (OUTPATIENT)
Dept: ORTHOPEDIC SURGERY | Age: 56
End: 2021-04-06

## 2021-04-06 NOTE — PROGRESS NOTES
Patient presents to the office today for suture removal. Patient is 11 days postop left wrist hardware removal with FPL tendon reconstruction with EIP tendon and a DIP joint fusion to the middle finger. Incision is well approximated with no signs of infection. Sutures removed, patient tolerated well. Covered incisions with band aid and dry dressing. Patient instructed to keep incisions clean and dry for the next 12-24hrs. Educated patient after the 12-24 hrs she can get incisions wet with soapy water and to not soak the incisions. Patient verbalized understanding. Patient to follow up with Dr. Al Hathaway on 4/26.

## 2021-04-07 ENCOUNTER — TREATMENT (OUTPATIENT)
Dept: PHYSICAL THERAPY | Age: 56
End: 2021-04-07
Payer: COMMERCIAL

## 2021-04-07 DIAGNOSIS — S46.011A TRAUMATIC COMPLETE TEAR OF RIGHT ROTATOR CUFF, INITIAL ENCOUNTER: Primary | ICD-10-CM

## 2021-04-07 PROCEDURE — 97110 THERAPEUTIC EXERCISES: CPT

## 2021-04-07 PROCEDURE — G0283 ELEC STIM OTHER THAN WOUND: HCPCS

## 2021-04-07 PROCEDURE — 97140 MANUAL THERAPY 1/> REGIONS: CPT

## 2021-04-07 NOTE — PROGRESS NOTES
Physical Therapy Daily Treatment Note    Date: 2021  Patient Name: Steph Beckwith  : 1965   MRN: [id number]  DOInjury: 2020  DOSx: 2021 RIGHT SHOULDER ROTATOR CUFF TEAR, BICEPS TENODESIS  Referring Provider:  MARS Masters - [facility] [address] [place] [address]             Medical Diagnosis:      Diagnosis Orders   1. Traumatic complete tear of right rotator cuff, initial encounter       NO STRENGTHENING UNTIL !!! Outcome Measure: Quick Dash: 52% Impairment    S: Pt states R shoulder doing better each day. ( sx on L hand thumb on 2021 )    Time 6730-6157      Visit 8   /  Repeat outcome measure at mid point and end.     Pain 5 /10     ROM See eval     Modalities        Ice   + ES to R shoulder  Ice/ ES   X 15 min  Post ex's  mo               Manual 15 mins flex, abd, ER, IR  mt               Stretch      Table slides 3 reps x 1 set with 10s holds each for flex, scap, abd, ER, IR HEP TE   Wall Walk Flex 10 reps x 1 set with 10s holds     Wall Walk ABD 10 reps x 1 set with 10s holds     Wall Pec/ER Stretch      Wall ER Stretch 10 reps x 1 set with 10s holds     Towel IR Stretch        Cross Body Adduction      Supine Stretch with Arms Behind Head            Exercise      UBE          Pulleys - Flex Unable / L hand sx  TE   Pulleys - IR Unable  TE        Supine Wand Flex Unable  TE   Supine Wand Bench Press Unable  TE   Supine Wand ER/IR Unable  TE   Standing Wand IR Unable  TE   Standing Wand Scaption Unable  TE   Standing Wand Flex      Standing Wand ER/IR      Shrugs AROM       Pendulum Ex   TE         Supine Flex      S-lying ABD      S-lying ER            Prone Flexion      Prone Ext      Prone Row with ER      Prone Horizontal ABD            Standing Flex      Standing ER/IR      Standing Scaption       Standing ABD      Standing Shoulder Press       Functional activities To aid in ROM and strength needed for reaching , lifting ,pushing and pulling at home/work    ROWS: H 4/16      ROWS: M 4/16 \"    ROWS: L 4/16 \"    ER 4/16 \"    IR 4/16 \"    Punches      Shoulder Press      Shoulder Flex      Shoulder ABD            Weighted Machines      Lat Pull Down      Vertical Row      A:  Tolerated well with stretching only . Pt now in L wrist splint due to L hand/tumb resent sx  Since last rx session. Pt only able to use R arm for  stretches  to tolerance now. Emphasis on self and manual stretches along with modalities end of rx session. P: Continue with rehab plan.  Rj shoulder strengthening allowed 4/16/2021   Jessica Mendoza PTA    Treatment Charges: Mins Units   Initial Evaluation     Re-Evaluation     Ther Exercise         TE 15 1   Manual Therapy     MT 15 1   Ther Activities        TA     Gait Training          GT     Neuro Re-education NR     Modalities X 15 1   Non-Billable Service Time Ice  0   Other     Total Time/Units 45 3

## 2021-04-08 ENCOUNTER — EVALUATION (OUTPATIENT)
Dept: OCCUPATIONAL THERAPY | Age: 56
End: 2021-04-08
Payer: COMMERCIAL

## 2021-04-08 DIAGNOSIS — S66.812A RUPTURE OF EXTENSOR TENDONS OF LEFT HAND AND WRIST, INITIAL ENCOUNTER: Primary | ICD-10-CM

## 2021-04-08 PROCEDURE — 97166 OT EVAL MOD COMPLEX 45 MIN: CPT | Performed by: OCCUPATIONAL THERAPIST

## 2021-04-08 PROCEDURE — 97110 THERAPEUTIC EXERCISES: CPT | Performed by: OCCUPATIONAL THERAPIST

## 2021-04-08 PROCEDURE — 97760 ORTHOTIC MGMT&TRAING 1ST ENC: CPT | Performed by: OCCUPATIONAL THERAPIST

## 2021-04-12 ENCOUNTER — TREATMENT (OUTPATIENT)
Dept: OCCUPATIONAL THERAPY | Age: 56
End: 2021-04-12
Payer: COMMERCIAL

## 2021-04-12 DIAGNOSIS — S66.812A RUPTURE OF EXTENSOR TENDONS OF LEFT HAND AND WRIST, INITIAL ENCOUNTER: Primary | ICD-10-CM

## 2021-04-12 PROCEDURE — 97110 THERAPEUTIC EXERCISES: CPT | Performed by: OCCUPATIONAL THERAPIST

## 2021-04-12 PROCEDURE — 97140 MANUAL THERAPY 1/> REGIONS: CPT | Performed by: OCCUPATIONAL THERAPIST

## 2021-04-12 NOTE — PROGRESS NOTES
[address] [place] [address]    OCCUPATIONAL THERAPY PROGRESS NOTE    Date:  2021  Initial Evaluation Date: 21    Patient Name:  Monique Rogers    :  1965  Restrictions/Precautions: Follow flexor tendon/  Passive flexion and active ext protocol, Can start AROM at 4 weeks post op - 21, low fall risk  Diagnosis:  Thumb FPL rupture/repair L hand   S66.819A (ICD-10-CM) - Rupture of flexor tendon of hand, initial encounter                                                             Insurance/Certification information:  [facility]   Referring Practitioner:  Dr.A Radha Khalil  Date of Surgery/Injury: 3/26/2021 (LMF DIP fusion, L FPL reconstruction with harvest extensor indicis)  Plan of care signed (Y/N): Y (thru 21)  Visit# / total visits:     Pain Level: mild, sensitive and uncomfortable in the left thumb/ wrist, especially on incisions    Subjective: \" I really need to get this going. I am going back to work next week. \"     Objective:  Updated POC to be completed by visit 10. INTERVENTION: COMPLETED: SPECIFICS/COMMENTS:   Modality:     MH x hand/ wrist x 5 min prior to  Massage and ROM        ROM exercise  All ROM completed in protective positions   Passive flexion/ active extension thumb 10-15x each Focus at IP, MP and composite   Wrist ROM- relaxed tenodesis x Relax into full wrist flexion, extension to neutral with passive flexion of thumb   Blocking ex LMF PIP x Gentle AROM at the PIP        PROM/Stretching:               Scar Mass/Edema Control:     Retrograde massage/ scar massage x Retrograde massage to LMF distal digit/ scar massage at LMF DIP, thumb and wrist as tolerated- pt to continue self massage at home.  Digital compression tubing provided for home use under the stax splint   Soft tissue mob x Focus on the L web space and palmar hand- sensitive at start but extinguished with repetition   Strengthening:               Other: Assessment/Comments: Pt is making Fair + progress toward stated plan of care. Protective passive flexion/ active extension exercises continued as tolerated. Pt reports she completes the exercises often. The main pain reported is with scar massage. Pt to completed self massage at home 4x/ day. Pt verbalized understanding. Will continue as tolerated. -Rehab Potential: Good  -Requires OT Follow Up: Yes  Time In:1350            Time Out: 1450             Visit #: 2    Treatment Charges: Mins Units   Modalities     Ther Exercise 25 2   Manual Therapy 30 2   Thera Activities     ADL/Home Mgt      Neuro Re-education     Group Therapy     Non-Billable Service Time     Other:  5 0   Total Time/Units 60 4       -Response to Treatment: Pt is concerned about the expected length of recovery. Support provided. Pt states she plans to get her nails done. Pt was discouraged form getting the thumb of middle fingers done. She voiced understanding. Goals: Goals for pt can be seen on initial eval occurring on 4-12-21    Plan:   [x]  Continue Plan of care: Continue protective ROM, scar mgmt, and edema control. Pt education continues at each visit to obtain maximum benefits from skilled OT intervention.   []  [address] of care:   []  Discharge:      Guille Sahni OT/L  [id number]

## 2021-04-13 ENCOUNTER — TREATMENT (OUTPATIENT)
Dept: PHYSICAL THERAPY | Age: 56
End: 2021-04-13
Payer: COMMERCIAL

## 2021-04-13 DIAGNOSIS — D64.9 ANEMIA, UNSPECIFIED TYPE: ICD-10-CM

## 2021-04-13 DIAGNOSIS — S46.011A TRAUMATIC COMPLETE TEAR OF RIGHT ROTATOR CUFF, INITIAL ENCOUNTER: Primary | ICD-10-CM

## 2021-04-13 DIAGNOSIS — R53.83 FATIGUE, UNSPECIFIED TYPE: ICD-10-CM

## 2021-04-13 LAB
BASOPHILS ABSOLUTE: 0.05 E9/L (ref 0–0.2)
BASOPHILS RELATIVE PERCENT: 1 % (ref 0–2)
EOSINOPHILS ABSOLUTE: 0.22 E9/L (ref 0.05–0.5)
EOSINOPHILS RELATIVE PERCENT: 4.5 % (ref 0–6)
FERRITIN: 105 NG/ML
HCT VFR BLD CALC: 32.9 % (ref 34–48)
HEMOGLOBIN: 10.5 G/DL (ref 11.5–15.5)
IMMATURE GRANULOCYTES #: 0.01 E9/L
IMMATURE GRANULOCYTES %: 0.2 % (ref 0–5)
IRON SATURATION: 15 % (ref 15–50)
IRON: 57 MCG/DL (ref 37–145)
LYMPHOCYTES ABSOLUTE: 1.48 E9/L (ref 1.5–4)
LYMPHOCYTES RELATIVE PERCENT: 30.1 % (ref 20–42)
MCH RBC QN AUTO: 29.5 PG (ref 26–35)
MCHC RBC AUTO-ENTMCNC: 31.9 % (ref 32–34.5)
MCV RBC AUTO: 92.4 FL (ref 80–99.9)
MONOCYTES ABSOLUTE: 0.49 E9/L (ref 0.1–0.95)
MONOCYTES RELATIVE PERCENT: 10 % (ref 2–12)
NEUTROPHILS ABSOLUTE: 2.66 E9/L (ref 1.8–7.3)
NEUTROPHILS RELATIVE PERCENT: 54.2 % (ref 43–80)
PDW BLD-RTO: 11.9 FL (ref 11.5–15)
PLATELET # BLD: 463 E9/L (ref 130–450)
PMV BLD AUTO: 10.3 FL (ref 7–12)
RBC # BLD: 3.56 E12/L (ref 3.5–5.5)
TOTAL IRON BINDING CAPACITY: 379 MCG/DL (ref 250–450)
WBC # BLD: 4.9 E9/L (ref 4.5–11.5)

## 2021-04-13 PROCEDURE — G0283 ELEC STIM OTHER THAN WOUND: HCPCS

## 2021-04-13 PROCEDURE — 97110 THERAPEUTIC EXERCISES: CPT

## 2021-04-13 NOTE — PROGRESS NOTES
Physical Therapy Daily Treatment Note    Date: 2021  Patient Name: Amanda Theodore  : 1965   MRN: [id number]  DOInjury: 2020  DOSx: 2021 RIGHT SHOULDER ROTATOR CUFF TEAR, BICEPS TENODESIS  Referring Provider:  Madelin Abbott, APRN - [facility] Diagnosis:      Diagnosis Orders   1. Traumatic complete tear of right rotator cuff, initial encounter       NO STRENGTHENING UNTIL !!! Outcome Measure: Quick Dash: 52% Impairment    S: Pt states no new changes . ( sx on L hand thumb on 2021 )    Time 1300- 1345       Visit -  Repeat outcome measure at mid point and end.     Pain 5 /10     ROM See eval     Modalities        Ice   + ES to R shoulder  Ice/ ES   X 15 min  Post ex's  mo               Manual 15 mins flex, abd, ER, IR  mt               Stretch      Table slides 3 reps x 1 set with 10s holds each for flex, scap, abd, ER, IR HEP TE   Wall Walk Flex 10 reps x 1 set with 10s holds     Wall Walk ABD 10 reps x 1 set with 10s holds     Wall Pec/ER Stretch      Wall ER Stretch 10 reps x 1 set with 10s holds     Towel IR Stretch        Cross Body Adduction      Supine Stretch with Arms Behind Head            Exercise      UBE          Pulleys - Flex Unable / L hand sx  TE   Pulleys - IR Unable  TE        Supine Wand Flex Unable  TE   Supine Wand Bench Press Unable  TE   Supine Wand ER/IR Unable  TE   Standing Wand IR Unable  TE   Standing Wand Scaption Unable  TE   Standing Wand Flex      Standing Wand ER/IR      Shrugs AROM       Pendulum Ex   TE         Supine Flex      S-lying ABD      S-lying ER            Prone Flexion      Prone Ext      Prone Row with ER      Prone Horizontal ABD            Standing Flex      Standing ER/IR      Standing Scaption       Standing ABD      Standing Shoulder Press       Functional activities To aid in ROM and strength needed for reaching , lifting ,pushing and pulling at [employer] ROWS: H 4/16      ROWS: M 4/16 \"    ROWS: L 4/16 \"    ER 4/16 \"    IR 4/16 \"    Punches      Shoulder Press      Shoulder Flex      Shoulder ABD            Weighted Machines      Lat Pull Down      Vertical Row      A:  Tolerated well with  Continued stretching only until 4/16/2021  . Pt now in L wrist splint due to L hand/tumb resent sx  Since last rx session. Pt only able to use R arm for  stretches  to tolerance now. Emphasis on self and manual stretches along with modalities end of rx session. P: Continue with rehab plan.  Rj shoulder strengthening allowed 4/16/2021   Rina Rivera PTA    Treatment Charges: Mins Units   Initial Evaluation     Re-Evaluation     Ther Exercise         TE 30 2   Manual Therapy     MT     Ther Activities        TA     Gait Training          GT     Neuro Re-education NR     Modalities X 15 1   Non-Billable Service Time Ice  0   Other     Total Time/Units 45 3

## 2021-04-15 ENCOUNTER — TREATMENT (OUTPATIENT)
Dept: OCCUPATIONAL THERAPY | Age: 56
End: 2021-04-15
Payer: COMMERCIAL

## 2021-04-15 ENCOUNTER — TREATMENT (OUTPATIENT)
Dept: PHYSICAL THERAPY | Age: 56
End: 2021-04-15
Payer: COMMERCIAL

## 2021-04-15 DIAGNOSIS — S66.812A RUPTURE OF EXTENSOR TENDONS OF LEFT HAND AND WRIST, INITIAL ENCOUNTER: Primary | ICD-10-CM

## 2021-04-15 DIAGNOSIS — S46.011A TRAUMATIC COMPLETE TEAR OF RIGHT ROTATOR CUFF, INITIAL ENCOUNTER: Primary | ICD-10-CM

## 2021-04-15 DIAGNOSIS — M75.21 BICEPS TENDONITIS ON RIGHT: ICD-10-CM

## 2021-04-15 PROCEDURE — G0283 ELEC STIM OTHER THAN WOUND: HCPCS

## 2021-04-15 PROCEDURE — 97110 THERAPEUTIC EXERCISES: CPT | Performed by: OCCUPATIONAL THERAPIST

## 2021-04-15 PROCEDURE — 97140 MANUAL THERAPY 1/> REGIONS: CPT | Performed by: OCCUPATIONAL THERAPIST

## 2021-04-15 PROCEDURE — 97110 THERAPEUTIC EXERCISES: CPT

## 2021-04-15 NOTE — PROGRESS NOTES
Physical Therapy Daily Treatment Note    Date: 4/15/2021  Patient Name: Sylvain Castañeda  : 1965   MRN: [id number]  DOInjury: 2020  DOSx: 2021 RIGHT SHOULDER ROTATOR CUFF TEAR, BICEPS TENODESIS  Referring Provider:  Dmitry Jaquez, APRN - [facility]             Medical Diagnosis:      Diagnosis Orders   1. Traumatic complete tear of right rotator cuff, initial encounter     2. Biceps tendonitis on right       NO STRENGTHENING UNTIL !!! Outcome Measure: Quick Dash: 52% Impairment  Mid point score 4/15/2021= 52%     S: Pt states  Her shoulder is more stiff today possibly due to weather. .  ( sx on L hand thumb on 2021 )    Time 2888-4391      Visit 10   / 16-  Repeat outcome measure at mid point and end.     Pain 6-7  /10     ROM See eval     Modalities         MH + ES to R shoulder   MH today / ES   X 15 min  Post ex's  mo               Manual 15 mins flex, abd, ER, IR  mt               Stretch      Table slides HEP TE   Wall Walk Flex 10 reps x 1 set with 10s holds     Wall Walk ABD 10 reps x 1 set with 10s holds     Wall Pec/ER Stretch      Wall ER Stretch 10 reps x 1 set with 10s holds     Towel IR Stretch        Cross Body Adduction      Supine Stretch with Arms Behind Head            Exercise      UBE          Pulleys - Flex Unable / L hand sx  TE   Pulleys - IR Unable  TE        Supine Wand Flex Unable  TE   Supine Wand Bench Press Unable  TE   Supine Wand ER/IR Unable  TE   Standing Wand IR Unable  TE   Standing Wand Scaption Unable  TE   Standing Wand Flex      Standing Wand ER/IR      Shrugs AROM       Pendulum Ex   TE         Supine Flex      S-lying ABD      S-lying ER            Prone Flexion      Prone Ext      Prone Row with ER      Prone Horizontal ABD            Standing Flex      Standing ER/IR      Standing Scaption       Standing ABD      Standing Shoulder Press       Functional activities To aid in ROM and strength needed for reaching , lifting ,pushing and pulling at home/work    ROWS: H 4/16      ROWS: M 4/16 \"    ROWS: L 4/16 \"    ER 4/16 \"    IR 4/16 \"    Punches      Shoulder Press      Shoulder Flex      Shoulder ABD            Weighted Machines      Lat Pull Down      Vertical Row      A:  Tolerated well with  Continued stretching only until 4/16/2021  . Pt now in L wrist splint due to L hand/tumb resent sx  Since last rx session. Pt only able to use R arm for  stretches  to tolerance now. Emphasis on self and manual stretches along with modalities end of rx session. P: Continue with rehab plan. Rj shoulder strengthening allowed 4/16/2021 Strengthening starting next visit.    Yahir Morejon PTA    Treatment Charges: Mins Units   Initial Evaluation     Re-Evaluation     Ther Exercise         TE 20 1   Manual Therapy     MT     Ther Activities        TA     Gait Training          GT     Neuro Re-education NR     Modalities X 15 1   Non-Billable Service Time Ice  0   Other     Total Time/Units 35 2

## 2021-04-20 ENCOUNTER — TREATMENT (OUTPATIENT)
Dept: PHYSICAL THERAPY | Age: 56
End: 2021-04-20
Payer: COMMERCIAL

## 2021-04-20 DIAGNOSIS — S46.011A TRAUMATIC COMPLETE TEAR OF RIGHT ROTATOR CUFF, INITIAL ENCOUNTER: Primary | ICD-10-CM

## 2021-04-20 DIAGNOSIS — M75.21 BICEPS TENDONITIS ON RIGHT: ICD-10-CM

## 2021-04-20 PROCEDURE — 97110 THERAPEUTIC EXERCISES: CPT

## 2021-04-20 PROCEDURE — 97530 THERAPEUTIC ACTIVITIES: CPT

## 2021-04-20 PROCEDURE — G0283 ELEC STIM OTHER THAN WOUND: HCPCS

## 2021-04-21 ENCOUNTER — TREATMENT (OUTPATIENT)
Dept: OCCUPATIONAL THERAPY | Age: 56
End: 2021-04-21
Payer: COMMERCIAL

## 2021-04-21 DIAGNOSIS — S66.812A RUPTURE OF EXTENSOR TENDONS OF LEFT HAND AND WRIST, INITIAL ENCOUNTER: Primary | ICD-10-CM

## 2021-04-21 PROCEDURE — 97110 THERAPEUTIC EXERCISES: CPT | Performed by: OCCUPATIONAL THERAPIST

## 2021-04-21 PROCEDURE — 97140 MANUAL THERAPY 1/> REGIONS: CPT | Performed by: OCCUPATIONAL THERAPIST

## 2021-04-21 NOTE — PROGRESS NOTES
[address] [place] [address]    OCCUPATIONAL THERAPY PROGRESS NOTE    Date:  2021  Initial Evaluation Date: 21    Patient Name:  Fabrice Palomo    :  1965  Restrictions/Precautions: Follow flexor tendon/  Passive flexion and active ext protocol, Can start AROM at 4 weeks post op - 21, low fall risk  Diagnosis:  Thumb FPL rupture/repair L hand   S66.819A (ICD-10-CM) - Rupture of flexor tendon of hand, initial encounter                                                             Insurance/Certification information:  [facility]   Referring Practitioner:  Dr.A Naty Duenas  Date of Surgery/Injury: 3/26/2021 (LMF DIP fusion, L FPL reconstruction with harvest extensor indicis)  Plan of care signed (Y/N): Y (thru 21)  Visit# / total visits:     Pain Level: 4/10 , sensitive and uncomfortable in the left thumb/ wrist    Subjective: Pt presents with no new complaints. Objective:  Updated POC to be completed by visit 10. INTERVENTION: COMPLETED: SPECIFICS/COMMENTS:   Modality:     MH x hand/ wrist x 5 min prior to  Massage and ROM        ROM exercise  All ROM completed in protective positions   Passive flexion/ active extension thumb 10-15x each Focus at IP, MP and composite   Wrist ROM- relaxed tenodesis x Relax into full wrist flexion, extension to neutral with passive flexion of thumb flexion   Blocking ex LMF PIP x Gentle AROM at the PIP   Place and hold thumb flexion w/ protective extension- NEW x Completed as tolerated             PROM/Stretching:     Gentle passive IP extension / in protective position x         Scar Mass/Edema Control:     Retrograde massage/ scar massage x Retrograde massage to LMF distal digit/ scar massage at LMF DIP, thumb and wrist as tolerated- pt to continue self massage at home.  Digital compression tubing provided for home use under the stax splint   Soft tissue mob x Focus on the L web space and palmar hand- sensitive at start but extinguished with repetition   Strengthening:               Other:                 Assessment/Comments: Pt is making Fair + progress toward stated plan of care. Swelling in the hand is getting better. ROM continued including Protective passive flexion/ active extension exercises. Stiffness is noted at Tx start but improved with Tx. Will continue and advance into light AROM at next visit. -Rehab Potential: Good  -Requires OT Follow Up: Yes  Time In: 0735            Time Out: 0830             Visit #: 4    Treatment Charges: Mins Units   Modalities     Ther Exercise 25 2   Manual Therapy 25 2   Thera Activities     ADL/Home Mgt      Neuro Re-education     Group Therapy     Non-Billable Service Time     Other:  5 0   Total Time/Units 55 4       -Response to Treatment: Pt is frustrated by her condition. Support provided. Goals: Goals for pt can be seen on initial eval occurring on 4-12-21    Plan:   [x]  Continue Plan of care: Start AROM at next visit. Continue protective ROM, scar mgmt, and edema control. Pt education continues at each visit to obtain maximum benefits from skilled OT intervention.   []  [facility] of care:   []  Discharge:      Joan Felix OT/L  [id number]

## 2021-04-23 ENCOUNTER — TREATMENT (OUTPATIENT)
Dept: OCCUPATIONAL THERAPY | Age: 56
End: 2021-04-23
Payer: COMMERCIAL

## 2021-04-23 DIAGNOSIS — S66.812A RUPTURE OF EXTENSOR TENDONS OF LEFT HAND AND WRIST, INITIAL ENCOUNTER: Primary | ICD-10-CM

## 2021-04-23 PROCEDURE — 97140 MANUAL THERAPY 1/> REGIONS: CPT | Performed by: OCCUPATIONAL THERAPIST

## 2021-04-23 PROCEDURE — 97110 THERAPEUTIC EXERCISES: CPT | Performed by: OCCUPATIONAL THERAPIST

## 2021-04-23 PROCEDURE — 97018 PARAFFIN BATH THERAPY: CPT | Performed by: OCCUPATIONAL THERAPIST

## 2021-04-23 NOTE — PROGRESS NOTES
[address] [place] [address]    OCCUPATIONAL THERAPY PROGRESS NOTE    Date:  2021  Initial Evaluation Date: 21    Patient Name:  Sydney Arnold    :  1965  Restrictions/Precautions: Follow flexor tendon/  Passive flexion and active ext protocol, Can start AROM at 4 weeks post op - 21, low fall risk  Diagnosis:  Thumb FPL rupture/repair L hand   S66.819A (ICD-10-CM) - Rupture of flexor tendon of hand, initial encounter                                                             Insurance/Certification information:  [facility]   Referring Practitioner:  Dr.A Breanne Coates  Date of Surgery/Injury: 3/26/2021 (LMF DIP fusion, L FPL reconstruction with harvest extensor indicis)  Plan of care signed (Y/N): Y (thru 21)  Visit# / total visits:     Pain Level: 4/10 , sensitive and uncomfortable in the left thumb/ wrist    Subjective: \" I am so glad to start moving more\". Objective:  Updated POC to be completed by visit 10. INTERVENTION: COMPLETED: SPECIFICS/COMMENTS:   Modality:     Paraffin x hand/ wrist x 10 min prior to  Massage and ROM        ROM exercise  All ROM completed in protective positions   Passive flexion/ active extension thumb 10-15x each Focus at IP, MP and composite   Blocking ex LMF PIP  Gentle AROM at the PIP   Place and hold thumb flexion w/  Extension as tolerated x Completed as tolerated   Thumb AROM  x All planes as tolerated   Wrist AROM x All planes as tolerated        PROM/Stretching:     Gentle passive IP extension / in protective position x         Scar Mass/Edema Control:     Retrograde massage/ scar massage x Retrograde massage to LMF distal digit/ scar massage at LMF DIP, thumb and wrist as tolerated- pt to continue self massage at home.  Digital compression tubing provided for home use under the stax splint   Soft tissue mob x Focus on the L web space and palmar hand- sensitive at start but extinguished with repetition Strengthening:               Other:     HEP x Scar massage, thumb/ wrist AROM, passive thumb  flexion and hold          Assessment/Comments: Pt is making Fair + progress toward stated plan of care. Edema has decreased to minimal. AROM start today as pt is 4 weeks post op. Tightness is present in the thumb and wrist as expected. The biggest issue to significant pain with movement  in the thumb CMC due to arthritis and prolonged immobilization. The most limited is radial extension. Pt has a Hx of requiring injections in the thumb for arthritis. AROM is tested as follows: Thumb flexion  -1.5 cm to base of small finger  Radial ext  0  Palmar ABD 20-45  Opposition full  CMC 0-23  MP 0-30  IP 30-45    Wrist AROM  Flexion 0-60  Extension 0-17  Rd 0-21  UD 0-22      -Rehab Potential: Good  -Requires OT Follow Up: Yes  Time In: 0730            Time Out: 0830             Visit #: 5    Treatment Charges: Mins Units   Modalities: paraffin 10 1   Ther Exercise 30 2   Manual Therapy 20 1   Thera Activities     ADL/Home Mgt      Neuro Re-education     Group Therapy     Non-Billable Service Time     Other:      Total Time/Units 60 4       -Response to Treatment: Pt is happy she is able to start moving her wrist and thumb more. Goals: Goals for pt can be seen on initial eval occurring on 4-12-21    Plan:   [x]  Continue Plan of care: Continue AROM as tolerated. Continue protective ROM, scar mgmt, and edema control. Pt education continues at each visit to obtain maximum benefits from skilled OT intervention.   []  [address] [facility] of care:   []  Discharge:      Veva Paget OT/L  [id number]

## 2021-04-26 ENCOUNTER — OFFICE VISIT (OUTPATIENT)
Dept: ORTHOPEDIC SURGERY | Age: 56
End: 2021-04-26
Payer: COMMERCIAL

## 2021-04-26 VITALS — BODY MASS INDEX: 21.6 KG/M2 | RESPIRATION RATE: 16 BRPM | HEIGHT: 60 IN | WEIGHT: 110 LBS

## 2021-04-26 DIAGNOSIS — S66.819A: ICD-10-CM

## 2021-04-26 DIAGNOSIS — M18.11 PRIMARY OSTEOARTHRITIS OF FIRST CARPOMETACARPAL JOINT OF RIGHT HAND: ICD-10-CM

## 2021-04-26 DIAGNOSIS — S66.819A: Primary | ICD-10-CM

## 2021-04-26 DIAGNOSIS — M18.12 ARTHRITIS OF CARPOMETACARPAL (CMC) JOINT OF LEFT THUMB: ICD-10-CM

## 2021-04-26 PROCEDURE — 99024 POSTOP FOLLOW-UP VISIT: CPT | Performed by: ORTHOPAEDIC SURGERY

## 2021-04-26 PROCEDURE — 20600 DRAIN/INJ JOINT/BURSA W/O US: CPT | Performed by: ORTHOPAEDIC SURGERY

## 2021-04-26 RX ORDER — BETAMETHASONE SODIUM PHOSPHATE AND BETAMETHASONE ACETATE 3; 3 MG/ML; MG/ML
6 INJECTION, SUSPENSION INTRA-ARTICULAR; INTRALESIONAL; INTRAMUSCULAR; SOFT TISSUE ONCE
Status: COMPLETED | OUTPATIENT
Start: 2021-04-26 | End: 2021-04-26

## 2021-04-26 RX ADMIN — BETAMETHASONE SODIUM PHOSPHATE AND BETAMETHASONE ACETATE 6 MG: 3; 3 INJECTION, SUSPENSION INTRA-ARTICULAR; INTRALESIONAL; INTRAMUSCULAR; SOFT TISSUE at 14:30

## 2021-04-26 NOTE — PROGRESS NOTES
Chief Complaint   Patient presents with    Post-Op Check     1 month out, Lt wrist hardware removal, Left middle finger DIP joint fusion, Left forearm flexor tenolysis          Layne Chávez is a 60y.o. year old  who presents for follow up of right wrist hardware removal and flexor tendon reconstruction. She also had a DIP joint fusion. At today's visit she is complaining worsening pain in the thumb basal joint. She reports this is quite bothersome to her. She has had problems with this in the past.  She has had a thumb CMC cortisone injection in the past with good results.       Past Medical History:   Diagnosis Date    Depression     Hyperlipidemia     slight not on meds    OA (osteoarthritis) of finger     hands    PONV (postoperative nausea and vomiting)     with anesthesia     Past Surgical History:   Procedure Laterality Date    CHOLECYSTECTOMY  10/2007    COLONOSCOPY  08/09/2011    DODIG    FRACTURE SURGERY  aug 2012    open reduction internal fixation left wrist    HAND SURGERY Left 3/26/2021    LEFT WRIST HARDWARE REMOVAL performed by Kath Kinney MD at [address] [place] [address] Hw Left 3/26/2021    FLEXOR TENOLYSIS LEFT THUMB FLEXOR TENDON RECONSTRUCTION WITH AUTOGRAFT  TENDON TRANSFER performed by Kath Kinney MD at [address] [place] [address] ARTHROSCOPY Right 1/22/2021    RIGHT SHOULDER ARTHROSCOPY, SUBACROMIAL DECOMPRESSION, ROTATOR CUFF REPAIR AND DEBRIDEMENT ([address] [place] [address]) performed by Shun Molina DO at [facility]  08/09/2011       Current Outpatient Medications:     doxepin (SINEQUAN) 10 MG capsule, TAKE 1 CAPSULE BY MOUTH TWO TIMES DAILY (Patient taking differently: nightly ), Disp: 180 capsule, Rfl: 2    ibuprofen (ADVIL;MOTRIN) 800 MG tablet, Take 1 tablet by mouth every 6 hours as needed for Pain, Disp: 21 tablet, Rfl: 0    Multiple Vitamins-Minerals (HAIR SKIN AND NAILS FORMULA PO), Take 1 tablet by mouth daily, Disp: , Rfl: Allergies   Allergen Reactions    Vicodin [Hydrocodone-Acetaminophen] Nausea And Vomiting     Social History     Socioeconomic History    Marital status:      Spouse name: Fredy Cooley    Number of children: 2    Years of education: BSN    Highest education level: Not on file   Occupational History    Occupation: RN   Social Needs    Financial resource strain: Not hard at all   [place] insecurity     Worry: Never true     Inability: Never true   [employer] needs     Medical: No     Non-medical: No   Tobacco Use    Smoking status: Never Smoker    Smokeless tobacco: Never Used   Substance and Sexual Activity    Alcohol use: Yes     Comment: rarely    Drug use: No    Sexual activity: Not on file   Lifestyle    Physical activity     Days per week: 5 days     Minutes per session: 60 min    Stress: Only a little   Relationships    Social connections     Talks on phone: More than three times a week     Gets together: More than three times a week     Attends [employer]ism service: More than 4 times per year     Active member of club or organization: Yes     Attends meetings of clubs or organizations: More than 4 times per year     Relationship status:     Intimate partner violence     Fear of current or ex partner: No     Emotionally abused: No     Physically abused: No     Forced sexual activity: No   Other Topics Concern    Not on file   Social History Narrative    Not on file     Family History   Problem Relation Age of Onset    Stroke Mother     Colon Cancer Father     Other Father     Cancer Sister         Breast    Other Sister        Skin: (-) rash,(-) psoriasis,(-) eczema, (-)skin cancer. Musculoskeletal: right thumb pain  Neurologic: (-) epilepsy, (-)seizures,(-) brain tumor,(-) TIA, (-)stroke, (-)headaches, (-)Parkinson disease,(-) memory loss, (-) LOC.   Cardiovascular: (-) Chest pain, (-) swelling in legs/feet, (-) SOB, (-) cramping in legs/feet with walking. SUBJECTIVE:      Constitutional:    The patient is alert and oriented x 3, appears to be stated age and in no distress. Right upper extremity: Nontender of the shoulder and elbow. Scars mature. She does have an IP joint flexion contracture of the thumb. With wrist flexion she is can be improved about neutral.  Full thumb flexion with wrist extension. Very positive CMC grind test and tenderness over thumb basal joint. Radial, ulnar, median nerves are intact. 2+ radial pulse. Nontender of the DIP joint or middle finger. Mild swelling. Xrays: X-rays of the right middle finger were obtained today in the office AP lateral obliques demonstrate stable DIP joint fusion with screw fixation. There is severe degenerative changes of the thumb basal joint of the thumb as well. Impression office x-rays: Stable DIP joint fusion of middle finger. End-stage arthrosis thumb basal joint  Radiographic findings reviewed with patient      Impression:   Encounter Diagnosis   Name Primary?  Rupture of flexor tendon of hand, initial encounter Yes   Thumb basal joint arthritis  Stable fusion DIP middle finger  Depression    Plan: This signs were explained the patient. After discussion she wished to have a thumb cortisone injection provided over the thumb basal joint. This provider without complication. Discontinue bracing. Advance in therapy as tolerated. Follow-up 4 to 6 weeks    Procedure Note Wrist Thumb CMC Cortisone Injection    The left wrist thumb CMC joint was identified as the injection site. The risk and benefits of a cortisone injection were explained and the patient consented to the injection. Under sterile conditions, the wrist was injected with a mixture of 1 mL of 1% Lidocaine and 1 mL of 6 mg/mL Betamethasone without complication.  A sterile bandage was applied

## 2021-04-27 ENCOUNTER — TREATMENT (OUTPATIENT)
Dept: PHYSICAL THERAPY | Age: 56
End: 2021-04-27
Payer: COMMERCIAL

## 2021-04-27 DIAGNOSIS — S46.011A TRAUMATIC COMPLETE TEAR OF RIGHT ROTATOR CUFF, INITIAL ENCOUNTER: Primary | ICD-10-CM

## 2021-04-27 DIAGNOSIS — M75.21 BICEPS TENDONITIS ON RIGHT: ICD-10-CM

## 2021-04-27 PROCEDURE — G0283 ELEC STIM OTHER THAN WOUND: HCPCS

## 2021-04-27 PROCEDURE — 97530 THERAPEUTIC ACTIVITIES: CPT

## 2021-04-27 PROCEDURE — 97110 THERAPEUTIC EXERCISES: CPT

## 2021-04-27 RX ORDER — HYDROCODONE BITARTRATE AND ACETAMINOPHEN 7.5; 325 MG/1; MG/1
1 TABLET ORAL EVERY 6 HOURS PRN
Qty: 28 TABLET | Refills: 0 | Status: SHIPPED | OUTPATIENT
Start: 2021-04-27 | End: 2021-05-04

## 2021-04-27 NOTE — PROGRESS NOTES
Physical Therapy Daily Treatment Note    Date: 2021  Patient Name: Alix Miller  : 1965   MRN: [id number]  DOInjury: 2020  DOSx: 2021 RIGHT SHOULDER ROTATOR CUFF TEAR, BICEPS TENODESIS  Referring Provider:  Elysia Sevilla, APRN - [facility]             Medical Diagnosis:      Diagnosis Orders   1. Traumatic complete tear of right rotator cuff, initial encounter     2. Biceps tendonitis on right       NO STRENGTHENING UNTIL !!! Outcome Measure: Quick Dash: 52% Impairment  Mid point score 4/15/2021= 52%     S: Pt states  Feels ok today  Only a little sore . .  ( sx on L hand thumb on 2021 )    Time 2553-5813       Visit -  Repeat outcome measure at mid point and end.     Pain 5   /10     ROM See eval     Modalities         MH + ES to R shoulder   MH today / ES   X 15 min  Post ex's  mo               Manual   mt               Stretch      Table slides HEP TE   Wall Walk Flex 10 reps x 1 set with 10s holds     Wall Walk ABD 10 reps x 1 set with 10s holds     Wall Pec/ER Stretch      Wall ER Stretch 10 reps x 1 set with 10s holds     Towel IR Stretch        Cross Body Adduction      Supine Stretch with Arms Behind Head            Exercise      UBE    Fwds/bkds    3/3   TE   Pulleys - Flex Unable / L hand sx  TE   Pulleys - IR Unable  TE        Supine  Flex 1# 2 x 20   TE   Supine  Bench Press 1# 2 x 20   TE   Supine Wand ER/IR   TE   Standing Wand IR  TE   Seated press ups  1# 2 x 20     Standing Wand Scaption  TE   Standing Wand Flex      Standing Wand ER/IR      Shrugs AROM       Pendulum Ex   TE         Supine Flex      S-lying ABD      S-lying ER            Prone Flexion      Prone Ext      Prone Row with ER      Prone Horizontal ABD            Standing Flex      Standing ER/IR      Standing Scaption       Standing ABD      Standing Shoulder Press       Functional activities To aid in ROM and strength needed for reaching , lifting ,pushing and pulling at home/work    ROWS: H Green 2 x 20 R arm only   new 4/20/2021  ta   ROWS: M Green 2 x 20 R arm only  \" ta   ROWS: L Green 2 x 20 R arm only  \" ta   ER  red  2 x 20 R arm only  \" ta   IR Red 2 x 20 R arm only   \" ta   Bicep curls  Green 2 x 20 R arm only   ta   Tricep exts  Green 2 x 20 R arm only   ta   Punches      Shoulder Press      Shoulder Flex      Shoulder ABD            Weighted Machines      Lat Pull Down      Vertical Row      A:  Tolerated well  Pt able to tolerate all ex's without increase in c/o. .   . ( Pt  in L wrist splint due to L hand/tumb resent sx   Pt only able to use R arm for PT ex's at this time  ) . P: Continue with rehab plan.  Jessica Mendoza PTA    Treatment Charges: Mins Units   Initial Evaluation     Re-Evaluation     Ther Exercise         TE 10 1   Manual Therapy     MT     Ther Activities        TA 20 1   Gait Training          GT     Neuro Re-education NR     Modalities X 15 1   Non-Billable Service Time Ice  0   Other     Total Time/Units 45 3

## 2021-04-28 ENCOUNTER — TREATMENT (OUTPATIENT)
Dept: OCCUPATIONAL THERAPY | Age: 56
End: 2021-04-28
Payer: COMMERCIAL

## 2021-04-28 DIAGNOSIS — S66.812A RUPTURE OF EXTENSOR TENDONS OF LEFT HAND AND WRIST, INITIAL ENCOUNTER: Primary | ICD-10-CM

## 2021-04-28 PROCEDURE — 97760 ORTHOTIC MGMT&TRAING 1ST ENC: CPT | Performed by: OCCUPATIONAL THERAPIST

## 2021-04-28 PROCEDURE — 97140 MANUAL THERAPY 1/> REGIONS: CPT | Performed by: OCCUPATIONAL THERAPIST

## 2021-04-28 PROCEDURE — 97110 THERAPEUTIC EXERCISES: CPT | Performed by: OCCUPATIONAL THERAPIST

## 2021-04-28 PROCEDURE — 97018 PARAFFIN BATH THERAPY: CPT | Performed by: OCCUPATIONAL THERAPIST

## 2021-04-28 NOTE — PROGRESS NOTES
[address] [place] [address]    OCCUPATIONAL THERAPY PROGRESS NOTE    Date:  2021  Initial Evaluation Date: 21    Patient Name:  Selena Christensen    :  1965  Restrictions/Precautions: Follow flexor tendon/  Passive flexion and active ext protocol, Can start AROM at 4 weeks post op - 21, low fall risk  Diagnosis:  Thumb FPL rupture/repair L hand   S66.819A (ICD-10-CM) - Rupture of flexor tendon of hand, initial encounter                                                             Insurance/Certification information:  [facility]   Referring Practitioner:  Dr.A Gauri Royal  Date of Surgery/Injury: 3/26/2021 (LMF DIP fusion, L FPL reconstruction with harvest extensor indicis)  Plan of care signed (Y/N): Y (thru 21)  Visit# / total visits:     Pain Level: 4/10 , sensitive and uncomfortable in the left thumb/ wrist, increases to 9/10 with ROM to that affects the [facility]. Subjective: \" I got a shot in the thumb and it has really helped. I think I am moving better. \"  Objective:  Updated POC to be completed by visit 10. INTERVENTION: COMPLETED: SPECIFICS/COMMENTS:   Modality:     Paraffin x hand/ wrist x 10 min prior to  Massage and ROM        ROM exercise  All ROM completed in protective positions   AAROM 10-15x each Passive flexion/ active ext thumb- Focus at IP, MP and composite    x Place and hold thumb flexion w/ extension as tolerated    x Place and hold digital long fist   Thumb AROM  x All planes as tolerated   Wrist AROM x All planes as tolerated                  PROM/Stretching:     Gentle passive IP extension x Completed with thumb in neutral and with radial extension as tolerated   Fine motor exercises     Prehension ex            x         Scar Mass/Edema Control:     Retrograde massage/ scar massage x Retrograde massage to LMF distal digit/ scar massage at LMF DIP and volar thumb and wrist as tolerated- pt to continue self massage at home.  Digital compression tubing provided for home use under the stax splint   Soft tissue mob x Focus on the L web space and palmar hand- sensitive at start but extinguished with repetition   Strengthening:               Other:     HEP x Scar massage, thumb/ wrist AROM, passive thumb  flexion and hold   Splinting x DIP extension splint fabricated to prevent DIP flexion with extension ex for the thumb/ new shorter thumb spica also provided to wear in conjunction with DIP splint. Assessment/Comments: Pt is making Fair + progress toward stated plan of care. Scar massage, AROM and AAROM completed as tolerated. ROM tolerance is improving but tightness at the [facility] and along the flexor tendon continues. Splinting to promote radial extension stretch to the thumb tolerated well. Pain with movement improved as session progressed. Will continue    -Rehab Potential: Good  -Requires OT Follow Up: Yes  Time In: 0730            Time Out: 0830             Visit #: 6    Treatment Charges: Mins Units   Modalities: paraffin 10 1   Ther Exercise 15 1   Manual Therapy 20 1   Thera Activities     ADL/Home Mgt      Neuro Re-education     Group Therapy     Non-Billable Service Time     Other: ortho fit 15 1   Total Time/Units 60 4       -Response to Treatment: Pt is concerned about her condition following her last MD appointment. Support provided. Goals: Goals for pt can be seen on initial eval occurring on 4-12-21    Plan:   [x]  Continue Plan of care: Continue AROM as tolerated, splinting, scar mgmt, and edema control. Pt education continues at each visit to obtain maximum benefits from skilled OT intervention.   []  [address] of care:   []  Discharge:      Edna He OT/L  [id number]

## 2021-04-29 ENCOUNTER — TREATMENT (OUTPATIENT)
Dept: PHYSICAL THERAPY | Age: 56
End: 2021-04-29
Payer: COMMERCIAL

## 2021-04-29 DIAGNOSIS — S46.011A TRAUMATIC COMPLETE TEAR OF RIGHT ROTATOR CUFF, INITIAL ENCOUNTER: Primary | ICD-10-CM

## 2021-04-29 DIAGNOSIS — M75.21 BICEPS TENDONITIS ON RIGHT: ICD-10-CM

## 2021-04-29 PROCEDURE — G0283 ELEC STIM OTHER THAN WOUND: HCPCS

## 2021-04-29 PROCEDURE — 97110 THERAPEUTIC EXERCISES: CPT

## 2021-04-29 PROCEDURE — 97530 THERAPEUTIC ACTIVITIES: CPT

## 2021-04-29 NOTE — PROGRESS NOTES
Physical Therapy Daily Treatment Note    Date: 2021  Patient Name: Jayden Quezada  : 1965   MRN: [id number]  DOInjury: 2020  DOSx: 2021 RIGHT SHOULDER ROTATOR CUFF TEAR, BICEPS TENODESIS  Referring Provider:  Catina Sosa, APRN - [facility]             Medical Diagnosis:      Diagnosis Orders   1. Traumatic complete tear of right rotator cuff, initial encounter     2. Biceps tendonitis on right       NO STRENGTHENING UNTIL !!! Outcome Measure: Quick Dash: 52% Impairment  Mid point score 4/15/2021= 52%     S: Pt states  Is   Only a little sore . .  ( sx on L hand thumb on 2021 )    Time 3882-8853       Visit -  Repeat outcome measure at mid point and end.     Pain 5   /10     ROM See eval     Modalities         MH + ES to R shoulder   MH today / ES   X 15 min  Post ex's  mo               Manual   mt               Stretch      Table slides HEP TE   Wall Walk Flex 10 reps x 1 set with 10s holds     Wall Walk ABD 10 reps x 1 set with 10s holds     Wall Pec/ER Stretch      Wall ER Stretch 10 reps x 1 set with 10s holds     Towel IR Stretch        Cross Body Adduction      Supine Stretch with Arms Behind Head            Exercise      UBE    Fwds/bkds    3/3   TE   Pulleys - Flex Unable / L hand sx  TE   Pulleys - IR Unable  TE        Supine  Flex 1# 2 x 20   TE   Supine  Bench Press 1# 2 x 20   TE   Supine Wand ER/IR   TE   Standing Wand IR  TE   Seated press ups  1# 2 x 20     Standing Wand Scaption  TE   Standing Wand Flex      Standing Wand ER/IR      Shrugs AROM       Pendulum Ex   TE         Supine Flex      S-lying ABD      S-lying ER            Prone Flexion      Prone Ext      Prone Row with ER      Prone Horizontal ABD            Standing Flex      Standing ER/IR      Standing Scaption       Standing ABD      Standing Shoulder Press       Functional activities To aid in ROM and strength needed for reaching , lifting ,pushing and pulling at home/work    ROWS: H Green 2 x 20 R arm only   new 4/20/2021  ta   ROWS: M Green 2 x 20 R arm only  \" ta   ROWS: L Green 2 x 20 R arm only  \" ta   ER  red  2 x 20 R arm only  \" ta   IR Red 2 x 20 R arm only   \" ta   Bicep curls  Red  2 x 20 R arm only   ta   Tricep exts  Red  2 x 20 R arm only   ta   Punches Green 2 x 20 r arm only   ta   Shoulder Press      Shoulder Flex      Shoulder ABD            Weighted Machines      Lat Pull Down      Vertical Row      A:  Tolerated well  Pt  Progressing well with increase strength /endurnace performing all listed ex's . . ( Pt  in L wrist splint due to L hand/tumb resent sx   Pt only able to use R arm for PT ex's at this time  ) . P: Continue with rehab plan.  Bautista Postal, PTA    Treatment Charges: Mins Units   Initial Evaluation     Re-Evaluation     Ther Exercise         TE 10 1   Manual Therapy     MT     Ther Activities        TA 20 1   Gait Training          GT     Neuro Re-education NR     Modalities X 15 1   Non-Billable Service Time Ice  0   Other     Total Time/Units 45 3

## 2021-04-30 ENCOUNTER — TREATMENT (OUTPATIENT)
Dept: OCCUPATIONAL THERAPY | Age: 56
End: 2021-04-30
Payer: COMMERCIAL

## 2021-04-30 DIAGNOSIS — S66.812A RUPTURE OF EXTENSOR TENDONS OF LEFT HAND AND WRIST, INITIAL ENCOUNTER: Primary | ICD-10-CM

## 2021-04-30 PROCEDURE — 97760 ORTHOTIC MGMT&TRAING 1ST ENC: CPT | Performed by: OCCUPATIONAL THERAPIST

## 2021-04-30 PROCEDURE — 97140 MANUAL THERAPY 1/> REGIONS: CPT | Performed by: OCCUPATIONAL THERAPIST

## 2021-04-30 PROCEDURE — 97018 PARAFFIN BATH THERAPY: CPT | Performed by: OCCUPATIONAL THERAPIST

## 2021-05-04 ENCOUNTER — OFFICE VISIT (OUTPATIENT)
Dept: ORTHOPEDIC SURGERY | Age: 56
End: 2021-05-04
Payer: COMMERCIAL

## 2021-05-04 VITALS — WEIGHT: 114 LBS | TEMPERATURE: 98 F | HEIGHT: 60 IN | BODY MASS INDEX: 22.38 KG/M2

## 2021-05-04 DIAGNOSIS — S43.431A TEAR OF RIGHT GLENOID LABRUM, INITIAL ENCOUNTER: ICD-10-CM

## 2021-05-04 DIAGNOSIS — M75.21 BICEPS TENDONITIS ON RIGHT: ICD-10-CM

## 2021-05-04 DIAGNOSIS — S46.011A TRAUMATIC COMPLETE TEAR OF RIGHT ROTATOR CUFF, INITIAL ENCOUNTER: Primary | ICD-10-CM

## 2021-05-04 PROCEDURE — G8420 CALC BMI NORM PARAMETERS: HCPCS | Performed by: ORTHOPAEDIC SURGERY

## 2021-05-04 PROCEDURE — G8427 DOCREV CUR MEDS BY ELIG CLIN: HCPCS | Performed by: ORTHOPAEDIC SURGERY

## 2021-05-04 PROCEDURE — 3017F COLORECTAL CA SCREEN DOC REV: CPT | Performed by: ORTHOPAEDIC SURGERY

## 2021-05-04 PROCEDURE — 1036F TOBACCO NON-USER: CPT | Performed by: ORTHOPAEDIC SURGERY

## 2021-05-04 PROCEDURE — 99213 OFFICE O/P EST LOW 20 MIN: CPT | Performed by: ORTHOPAEDIC SURGERY

## 2021-05-04 NOTE — PROGRESS NOTES
Chief Complaint   Patient presents with    Shoulder Pain     Right Shoulder, RTC DOS 1/22/2021       Terrell Rincon returns today for follow up of her right shoulder arthroscopy with rtc reapir. she reports that the pain in the shoulder is better. she has been going to physical therapy. she is also complaining of intermittent pains. The patient is right dominant. The patient's pain level is a 2/10. The patient did respond to treatment. Past Medical History:   Diagnosis Date    Depression     Hyperlipidemia     slight not on meds    OA (osteoarthritis) of finger     hands    PONV (postoperative nausea and vomiting)     with anesthesia     Past Surgical History:   Procedure Laterality Date    CHOLECYSTECTOMY  10/2007    COLONOSCOPY  08/09/2011    DODIG    FRACTURE SURGERY  aug 2012    open reduction internal fixation left wrist    HAND SURGERY Left 3/26/2021    LEFT WRIST HARDWARE REMOVAL performed by Josh Majano MD at [place] [address] HAND TENDON SURGERY Left 3/26/2021    FLEXOR TENOLYSIS LEFT THUMB FLEXOR TENDON RECONSTRUCTION WITH AUTOGRAFT  TENDON TRANSFER performed by [facility] MD [facility] ARTHROSCOPY Right 1/22/2021    RIGHT SHOULDER ARTHROSCOPY, SUBACROMIAL DECOMPRESSION, ROTATOR CUFF REPAIR AND DEBRIDEMENT ([facility]) performed by Antonella Ballesteros DO at [address] [facility]  08/09/2011       Current Outpatient Medications:     HYDROcodone-acetaminophen (NORCO) 7.5-325 MG per tablet, Take 1 tablet by mouth every 6 hours as needed for Pain for up to 7 days. Intended supply: 7 days.  Take lowest dose possible to manage pain, Disp: 28 tablet, Rfl: 0    doxepin (SINEQUAN) 10 MG capsule, TAKE 1 CAPSULE BY MOUTH TWO TIMES DAILY (Patient taking differently: nightly ), Disp: 180 capsule, Rfl: 2    ibuprofen (ADVIL;MOTRIN) 800 MG tablet, Take 1 tablet by mouth every 6 hours as needed for Pain, Disp: 21 tablet, Rfl: 0    Multiple Vitamins-Minerals (-) multiple sclerosis, (-) muscular dystrophy, (-) RSD,(-) joint pain (-)swelling, (-) joint pain,swelling. Neurologic: (-) epilepsy, (-)seizures,(-) brain tumor,(-) TIA, (-)stroke, (-)headaches, (-)Parkinson disease,(-) memory loss, (-) LOC. Cardiovascular: (-) Chest pain, (-) swelling in legs/feet, (-) SOB, (-) cramping in legs/feet with walking. Respiratory: (-) SOB, (-) Coughing, (-) night sweats. GI: (-) nausea, (-) vomiting, (-) diarrhea, (-) blood in stool, (-) gastric ulcer. Psychiatric: (-) Depression, (-) Anxiety, (-) bipolar disease, (-) Alzheimer's Disease  Allergic/Immunologic: (-) allergies latex, (-) allergies metal, (-) skin sensitivity. Hematlogic: (-) anemia, (-) blood transfusion, (-) DVT/PE, (-) Clotting disorders    SUBJECTIVE:    Constitution:  The patient is alert and oriented x 3, appears to be stated age and in no distress. Temp 98 °F (36.7 °C)   Ht 5' (1.524 m)   Wt 114 lb (51.7 kg)   LMP 08/13/2012   BMI 22.26 kg/m²       Skin:  Upon inspection: the skin appears warm, dry and intact. There is  a previous scar over the affected area. There is not any cellulitis, lymphedema or cutaneous lesions noted in the lower extremities. Upon palpation there is no induration noted. Neurologic:  Gait: normal;  Motor exam of the upper extremities show: The reflexes in biceps/triceps/brachioradialis are equal and symmetric. Sensory exam C5-T1 are normal bilaterally. Cardiovascular: The vascular exam is normal and is well perfused to distal extremities. There are 2+ radial pulses bilaterally, and motor and sensation is intact to median, ulnar, and radial, musclocutaneus, and axillary nerve distribution and grossly symmetric bilaterally. There is cap refill noted less than two seconds in all digits. There is not edema of the bilateral upper extremities. There is not varicosities noted in the distal extremities.       Lymph:  Upon palpation,  there is no lymphadenopathy noted in with op arm  Fu 2 months

## 2021-05-05 ENCOUNTER — TREATMENT (OUTPATIENT)
Dept: OCCUPATIONAL THERAPY | Age: 56
End: 2021-05-05
Payer: COMMERCIAL

## 2021-05-05 DIAGNOSIS — S66.812A RUPTURE OF EXTENSOR TENDONS OF LEFT HAND AND WRIST, INITIAL ENCOUNTER: Primary | ICD-10-CM

## 2021-05-05 PROCEDURE — 97140 MANUAL THERAPY 1/> REGIONS: CPT | Performed by: OCCUPATIONAL THERAPIST

## 2021-05-05 PROCEDURE — 97760 ORTHOTIC MGMT&TRAING 1ST ENC: CPT | Performed by: OCCUPATIONAL THERAPIST

## 2021-05-05 PROCEDURE — 97018 PARAFFIN BATH THERAPY: CPT | Performed by: OCCUPATIONAL THERAPIST

## 2021-05-05 PROCEDURE — 97110 THERAPEUTIC EXERCISES: CPT | Performed by: OCCUPATIONAL THERAPIST

## 2021-05-05 NOTE — PROGRESS NOTES
small comfort cool CMC thumb splint for use at 8 weeks to support the [facility] and minimize CMC pain     Assessment/Comments: Pt is making Fair + progress toward stated plan of care. Pt states her [facility] has been limited in radial extension for a long time due to arthritis. Tx continued with a focus on ROM of the wrist, digits and thumb. Limitations at the [facility] remains and pt tends to over use the MP for compensation. AROM at the thumb  IP is getting better along with slowly improving stretch to the flexor tendon in extension.      -Rehab Potential: Good  -Requires OT Follow Up: Yes  Time In: 0730            Time Out: 0830             Visit #: 8    Treatment Charges: Mins Units   Modalities: paraffin 10 1   Ther Exercise 15 1   Manual Therapy 20 1   Thera Activities     ADL/Home Mgt      Neuro Re-education     Group Therapy     Non-Billable Service Time     Other: ortho fit 15 1   Total Time/Units 60 4       -Response to Treatment: Pt is frustrated by her slow recovery. Support provided. Goals: Goals for pt can be seen on initial eval occurring on 4-12-21    Plan:   [x]  Continue Plan of care: Continue AROM / stretches as tolerated, splinting, scar mgmt, and edema control. Pt education continues at each visit to obtain maximum benefits from skilled OT intervention.   []  [facility] of care:   []  Discharge:      Gui Jay OT/L  [id number]

## 2021-05-06 ENCOUNTER — TREATMENT (OUTPATIENT)
Dept: PHYSICAL THERAPY | Age: 56
End: 2021-05-06
Payer: COMMERCIAL

## 2021-05-06 DIAGNOSIS — S46.011A TRAUMATIC COMPLETE TEAR OF RIGHT ROTATOR CUFF, INITIAL ENCOUNTER: Primary | ICD-10-CM

## 2021-05-06 DIAGNOSIS — M75.21 BICEPS TENDONITIS ON RIGHT: ICD-10-CM

## 2021-05-06 PROCEDURE — 97530 THERAPEUTIC ACTIVITIES: CPT

## 2021-05-06 PROCEDURE — 97110 THERAPEUTIC EXERCISES: CPT

## 2021-05-06 PROCEDURE — G0283 ELEC STIM OTHER THAN WOUND: HCPCS

## 2021-05-06 NOTE — PROGRESS NOTES
Physical Therapy Daily Treatment Note    Date: 2021  Patient Name: Levi Tolliver  : 1965   MRN: [id number]  DOInjury: 2020  DOSx: 2021 RIGHT SHOULDER ROTATOR CUFF TEAR, BICEPS TENODESIS  Referring Provider:  Amilcar Farris APRN - [facility] [address] [place] [address]             Medical Diagnosis:      Diagnosis Orders   1. Traumatic complete tear of right rotator cuff, initial encounter     2. Biceps tendonitis on right       NO STRENGTHENING UNTIL !!! Outcome Measure: Quick Dash: 52% Impairment  Mid point score 4/15/2021= 52%     S: Pt states  She is a lot more sore today due to loner work hours lately . .. .  ( sx on L hand thumb on 2021 )    Time 6514-5460      Visit -  Repeat outcome measure at mid point and end.     Pain 5   /10     ROM See eval     Modalities         MH + ES to R shoulder   MH today / ES   X 15 min  Post ex's  mo               Manual   mt               Stretch      Table slides HEP TE   Wall Walk Flex 10 reps x 1 set with 10s holds     Wall Walk ABD 10 reps x 1 set with 10s holds     Wall Pec/ER Stretch      Wall ER Stretch 10 reps x 1 set with 10s holds     Towel IR Stretch        Cross Body Adduction      Supine Stretch with Arms Behind Head            Exercise      UBE    Fwds/bkds    3/3   TE   Pulleys - Flex Unable / L hand sx  TE   Pulleys - IR Unable  TE        Supine  Flex 1# 2 x 20   TE   Supine  Bench Press 1# 2 x 20   TE   Supine Wand ER/IR   TE   Standing Wand IR  TE   Seated press ups  1# 2 x 20     Standing Wand Scaption  TE   Standing Wand Flex      Standing Wand ER/IR      Shrugs AROM       Pendulum Ex   TE         Supine Flex      S-lying ABD      S-lying ER            Prone Flexion      Prone Ext      Prone Row with ER      Prone Horizontal ABD            Standing Flex      Standing ER/IR      Standing Scaption       Standing ABD      Standing Shoulder Press       Functional activities To aid in ROM and strength needed for reaching , lifting ,pushing and pulling at home/work    ROWS: H Green 2 x 20 R arm only   new 4/20/2021  ta   ROWS: M Green 2 x 20 R arm only  \" ta   ROWS: L Green 2 x 20 R arm only  \" ta   ER  red  2 x 20 R arm only  \" ta   IR Red 2 x 20 R arm only   \" ta   Bicep curls  Red  2 x 20 R arm only   ta   Tricep exts  Red  2 x 20 R arm only   ta   Punches Green 2 x 20 r arm only   ta   Shoulder Press      Shoulder Flex      Shoulder ABD            Weighted Machines      Lat Pull Down      Vertical Row      A:  Tolerated well . pt progressing well performing all ex's as listed above. . ( Pt  in L wrist soft splint due  resent sx   Pt only able to use R arm for PT ex's at this time  ) . Pt  P: Continue with rehab plan.   Aamir Silva PTA    Treatment Charges: Mins Units   Initial Evaluation     Re-Evaluation     Ther Exercise         TE 10 1   Manual Therapy     MT     Ther Activities        TA 20 1   Gait Training          GT     Neuro Re-education NR     Modalities X 15 1   Non-Billable Service Time Ice  0   Other     Total Time/Units 45 3

## 2021-05-07 ENCOUNTER — TREATMENT (OUTPATIENT)
Dept: OCCUPATIONAL THERAPY | Age: 56
End: 2021-05-07
Payer: COMMERCIAL

## 2021-05-07 DIAGNOSIS — S66.819A: Primary | ICD-10-CM

## 2021-05-07 PROCEDURE — 97140 MANUAL THERAPY 1/> REGIONS: CPT | Performed by: OCCUPATIONAL THERAPIST

## 2021-05-07 PROCEDURE — 97530 THERAPEUTIC ACTIVITIES: CPT | Performed by: OCCUPATIONAL THERAPIST

## 2021-05-07 PROCEDURE — 97110 THERAPEUTIC EXERCISES: CPT | Performed by: OCCUPATIONAL THERAPIST

## 2021-05-07 PROCEDURE — 97018 PARAFFIN BATH THERAPY: CPT | Performed by: OCCUPATIONAL THERAPIST

## 2021-05-07 NOTE — PROGRESS NOTES
[address], Abhinav Bustamante    OCCUPATIONAL THERAPY PROGRESS NOTE    Date:  2021  Initial Evaluation Date: 21    Patient Name:  Fabrice Palomo    :  1965  Restrictions/Precautions: Follow flexor tendon/  AROM as tolerated - 21, low fall risk  Diagnosis:  Thumb FPL rupture/repair L hand   S66.819A (ICD-10-CM) - Rupture of flexor tendon of hand, initial encounter                                                             Insurance/Certification information:  [facility]   Referring Practitioner:  Dr.A Naty Duenas  Date of Surgery/Injury: 3/26/2021 (LMF DIP fusion, L FPL reconstruction with harvest extensor indicis)  Plan of care signed (Y/N): Y (thru 21)  Visit# / total visits:     Pain Level: No pain at rest, 5-6/10 with ROM/ stretchesto the thumb  Subjective: \" I like that brace that holds my thumb out. I think it is helping\". Objective:  Updated POC to be completed by visit 10.     INTERVENTION: COMPLETED: SPECIFICS/COMMENTS:   Modality:     Paraffin x hand/ wrist x 10 min prior to  Massage and ROM        ROM exercise  All ROM completed in protective positions   AAROM thumb 10-15x each Passive flexion/ active ext thumb- Focus at IP, MP and composite    x Place and hold thumb flexion  and extension as tolerated    x Place and hold digital long fist   Thumb AROM  x All planes as tolerated, blocking ex at IP/ MP, towel ex with thumb   Wrist AROM x  AROM All planes as tolerated, wristciser   Hand AROM x Towel ex   PROM/Stretching thumb             x All planes with focus on thumb extension as tolerated        Fine motor exercises     Prehension ex            x coins   Manipulation ex            x -[place] in hand manipulation  - screw manipulation   Manual techniques:     scar massage x    Soft tissue mob x Focus on the L web space and palmar hand   Strengthening:               Other:     HEP x Scar massage, thumb/ wrist AROM, passive thumb  flexion and hold

## 2021-05-11 ENCOUNTER — TREATMENT (OUTPATIENT)
Dept: PHYSICAL THERAPY | Age: 56
End: 2021-05-11
Payer: COMMERCIAL

## 2021-05-11 DIAGNOSIS — M75.21 BICEPS TENDONITIS ON RIGHT: ICD-10-CM

## 2021-05-11 DIAGNOSIS — S46.011A TRAUMATIC COMPLETE TEAR OF RIGHT ROTATOR CUFF, INITIAL ENCOUNTER: Primary | ICD-10-CM

## 2021-05-11 PROCEDURE — 97530 THERAPEUTIC ACTIVITIES: CPT

## 2021-05-11 PROCEDURE — 97110 THERAPEUTIC EXERCISES: CPT

## 2021-05-11 NOTE — PROGRESS NOTES
Physical Therapy Daily Treatment Note    Date: 2021  Patient Name: Amanda Theodore  : 1965   MRN: [id number]  DOInjury: 2020  DOSx: 2021 RIGHT SHOULDER ROTATOR CUFF TEAR, BICEPS TENODESIS  Referring Provider:  Madelin Abbott APRN - [facility] [address] [place] [address]             Medical Diagnosis:      Diagnosis Orders   1. Traumatic complete tear of right rotator cuff, initial encounter     2. Biceps tendonitis on right       NO STRENGTHENING UNTIL !!! 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 ...  ( sx on L hand thumb on 2021 )    Time 1515-      Visit -20  Repeat outcome measure at mid point and end.     Pain 5   /10     /65/to belt line  2021          NOT performed  mo               Manual   mt               Stretch      Table slides HEP TE   Wall Walk Flex 10 reps x 1 set with 10s holds     Wall Walk ABD 10 reps x 1 set with 10s holds     Wall Pec/ER Stretch      Wall ER Stretch 10 reps x 1 set with 10s holds     Towel IR Stretch        Cross Body Adduction      Supine Stretch with Arms Behind Head            Exercise      UBE    Fwds/bkds    3/3   TE   Pulleys - Flex Unable / L hand sx  TE   Pulleys - IR Unable  TE        Supine  Flex 1# 2 x 20   TE   Supine  Bench Press 1# 2 x 20   TE   Supine Wand ER/IR   TE   Standing Wand IR  TE   Seated press ups  1# 2 x 20     Standing Wand Scaption  TE   Standing Wand Flex      Standing Wand ER/IR      Shrugs AROM       Pendulum Ex   TE         Supine Flex      S-lying ABD      S-lying ER            Prone Flexion      Prone Ext      Prone Row with ER      Prone Horizontal ABD            Standing Flex      Standing ER/IR      Standing Scaption       Standing ABD      Standing Shoulder Press       Functional activities To aid in ROM and strength needed for reaching , lifting ,pushing and pulling at home/work    ROWS: H Green 2 x 20 R arm only   new 4/20/2021  ta   ROWS: M Green 2 x 20 R arm only  \" ta   ROWS: L Green 2 x 20 R arm only  \" ta   ER  red  2 x 20 R arm only  \" ta   IR Red 2 x 20 R arm only   \" ta   Bicep curls  Red  2 x 20 R arm only   ta   Tricep exts  Red  2 x 20 R arm only   ta   Punches Green 2 x 20 r arm only   ta   Shoulder Press      Shoulder Flex      Shoulder ABD            Weighted Machines      Lat Pull Down      Vertical Row      A:  Tolerated well . pt progressing well  In AROM  as listed above. . ( Pt  in L wrist soft splint due  resent sx   Pt only able to use R arm for PT ex's at this time  ) . Pt  P: Continue with rehab plan.   Amy Curry PTA    Treatment Charges: Mins Units   Initial Evaluation     Re-Evaluation     Ther Exercise         TE 10 1   Manual Therapy     MT     Ther Activities        TA 25 2   Gait Training          GT     Neuro Re-education NR     Modalities     Non-Billable Service Time     Other     Total Time/Units 35 3

## 2021-05-12 ENCOUNTER — TREATMENT (OUTPATIENT)
Dept: OCCUPATIONAL THERAPY | Age: 56
End: 2021-05-12
Payer: COMMERCIAL

## 2021-05-12 DIAGNOSIS — S66.812A RUPTURE OF EXTENSOR TENDONS OF LEFT HAND AND WRIST, INITIAL ENCOUNTER: Primary | ICD-10-CM

## 2021-05-12 PROCEDURE — 97018 PARAFFIN BATH THERAPY: CPT | Performed by: OCCUPATIONAL THERAPIST

## 2021-05-12 PROCEDURE — 97530 THERAPEUTIC ACTIVITIES: CPT | Performed by: OCCUPATIONAL THERAPIST

## 2021-05-12 PROCEDURE — 97110 THERAPEUTIC EXERCISES: CPT | Performed by: OCCUPATIONAL THERAPIST

## 2021-05-12 PROCEDURE — 97140 MANUAL THERAPY 1/> REGIONS: CPT | Performed by: OCCUPATIONAL THERAPIST

## 2021-05-12 NOTE — PROGRESS NOTES
[address] [place] [address]    OCCUPATIONAL THERAPY PROGRESS NOTE    Date:  2021  Initial Evaluation Date: 21    Patient Name:  Isatu Jimenez    :  1965  Restrictions/Precautions: Follow flexor tendon/  AROM as tolerated - 21, low fall risk  Diagnosis:  Thumb FPL rupture/repair L hand   S66.819A (ICD-10-CM) - Rupture of flexor tendon of hand, initial encounter                                                             Insurance/Certification information:  [facility]   Referring Practitioner:  Dr.A Kirt Suarez  Date of Surgery/Injury: 3/26/2021 (LMF DIP fusion, L FPL reconstruction with harvest extensor indicis)  Plan of care signed (Y/N): Y (thru 21)  Visit# / total visits: 10 / 16    Pain Level: 1/10 at rest, 5-6/10 in the MP with ROM  Subjective: \" My middle finger is getting so sensitive. I stopped wrapping it with coban. It feels like skin more than the joint itself. \"    Objective:  Updated POC to be completed by visit 10.     INTERVENTION: COMPLETED: SPECIFICS/COMMENTS:   Modality:     Paraffin x hand/ wrist x 10 min prior to  Massage and ROM        ROM exercise     Thumb AROM  x All planes as tolerated, blocking ex at IP/ MP, towel ex with thumb   Wrist AROM x  AROM All planes as tolerated, Jux-A-ciser   Hand AROM x Towel ex   PROM/Stretching thumb             x All planes with focus on thumb extension as tolerated with stabilization at the MP        Fine motor exercises     Prehension ex            x coins   Manipulation ex            x -[place] in hand manipulation/ difficult  - screw manipulation   Manual techniques:     scar massage x    Soft tissue mob x Focus on the L web space and palmar hand   Strengthening:               Other:     HEP x Scar massage, thumb/ wrist AROM, passive thumb  flexion and hold   Splinting X  Attempted splint to stabilize MP only   Hand based splint updated again to further increase radial extension in the thumb Splinting x Pt was fit with a small comfort cool CMC thumb splint for use at 8 weeks to support the [facility] and minimize CMC pain     Assessment/Comments: Pt is making Fair + progress toward stated plan of care. Hyperextension at the MP continues to be a concern with all functional thumb use. This is causing soreness as the MP compensates for the [facility]. Shortening at the flexor tendon is improving but is limited by MP/ [facility] arthritic issues. Attempts to stabilize the MP only with splinting is unsuccessful. Will continue.    -Rehab Potential: Good  -Requires OT Follow Up: Yes  Time In: 0730            Time Out: 0830             Visit #: 10    Treatment Charges: Mins Units   Modalities: paraffin 10 1   Ther Exercise 20 1   Manual Therapy 20 1   Thera Activities 10 1   ADL/Home Mgt      Neuro Re-education     Group Therapy     Non-Billable Service Time     Other: ortho fit     Total Time/Units 60 4       -Response to Treatment: Pt is  Concerned about the arthritic limitation in her thumb. Support provided. Goals: Goals for pt can be seen on initial eval occurring on 4-12-21    Plan:   [x]  Continue Plan of care: Hold re-evaluation til next session. Trial fluidotherapy to help with sensitivity. Continue AROM / stretches as tolerated, splinting, scar mgmt, and edema control. Pt education continues at each visit to obtain maximum benefits from skilled OT intervention.   []  [address] [facility] of care:   []  Discharge:      Johnnie Drake OT/L  [id number]

## 2021-05-13 ENCOUNTER — TREATMENT (OUTPATIENT)
Dept: PHYSICAL THERAPY | Age: 56
End: 2021-05-13
Payer: COMMERCIAL

## 2021-05-13 DIAGNOSIS — M75.21 BICEPS TENDONITIS ON RIGHT: ICD-10-CM

## 2021-05-13 DIAGNOSIS — S46.011A TRAUMATIC COMPLETE TEAR OF RIGHT ROTATOR CUFF, INITIAL ENCOUNTER: Primary | ICD-10-CM

## 2021-05-13 PROCEDURE — 97530 THERAPEUTIC ACTIVITIES: CPT

## 2021-05-13 PROCEDURE — 97110 THERAPEUTIC EXERCISES: CPT

## 2021-05-13 NOTE — PROGRESS NOTES
Physical Therapy Daily Treatment Note    Date: 2021  Patient Name: Candia Meigs  : 1965   MRN: [id number]  DOInjury: 2020  DOSx: 2021 RIGHT SHOULDER ROTATOR CUFF TEAR, BICEPS TENODESIS  Referring Provider:  Clint Berry APRN - [address] [facility] [address] [place] [address]             Medical Diagnosis:      Diagnosis Orders   1. Traumatic complete tear of right rotator cuff, initial encounter     2. Biceps tendonitis on right       NO STRENGTHENING UNTIL !!! Outcome Measure: Quick Dash: 52% Impairment  Mid point score 4/15/2021= 52%     S: Pt states  She still gets sore with pulling sensation vs any high level of pain . ( sx on L hand thumb on 2021 )    Time 7857-2637      Visit -  Repeat outcome measure at mid point and end.     Pain 5   /10     /65/to belt line  2021          NOT performed  mo               Manual   mt               Stretch      Table slides HEP TE   Wall Walk Flex 10 reps x 1 set with 10s holds     Wall Walk ABD 10 reps x 1 set with 10s holds     Wall Pec/ER Stretch      Wall ER Stretch 10 reps x 1 set with 10s holds     Towel IR Stretch        Cross Body Adduction      Supine Stretch with Arms Behind Head            Exercise      UBE    Fwds/bkds    3/3 R arm only   TE   Pulleys - Flex Unable / L hand sx  TE   Pulleys - IR Unable  TE        Supine  Flex 1# 2 x 20   TE   Supine  Bench Press 1# 2 x 20   TE   Supine Wand ER/IR   TE   Standing Wand IR  TE   Seated straight  press ups  1# 2 x 20     Standing Wand Scaption  TE   Standing Wand Flex      Standing Wand ER/IR      Shrugs AROM       Pendulum Ex   TE         Supine Flex      S-lying ABD      S-lying ER            Prone Flexion      Prone Ext      Prone Row with ER      Prone Horizontal ABD            Standing Flex      Standing ER/IR      Standing Scaption       Standing ABD      Standing Shoulder Press       Functional activities To aid in ROM and strength needed for reaching , lifting ,pushing and pulling at home/work    ROWS: H Green 2 x 20 R arm only   new 4/20/2021  ta   ROWS: M Green 2 x 20 R arm only  \" ta   ROWS: L Green 2 x 20 R arm only  \" ta   ER  red  2 x 20 R arm only  \" ta   IR Red 2 x 20 R arm only   \" ta   Bicep curls  Red  2 x 20 R arm only   ta   Tricep exts  Red  2 x 20 R arm only   ta   Punches Green 2 x 20 r arm only   ta   Shoulder Press      Shoulder Flex      Shoulder ABD            Weighted Machines      Lat Pull Down      Vertical Row      A:  Tolerated well  Pt tolerating all ex's with minimal changes in c/o while performing . ( Pt  in L wrist soft splint due  resent sx   Pt only able to use R arm for PT ex's at this time  ) . Pt  P: Continue with rehab plan.   Tj London PTA    Treatment Charges: Mins Units   Initial Evaluation     Re-Evaluation     Ther Exercise         TE 10 1   Manual Therapy     MT     Ther Activities        TA 20 1   Gait Training          GT     Neuro Re-education NR     Modalities     Non-Billable Service Time     Other     Total Time/Units 30 2

## 2021-05-14 ENCOUNTER — TREATMENT (OUTPATIENT)
Dept: OCCUPATIONAL THERAPY | Age: 56
End: 2021-05-14
Payer: COMMERCIAL

## 2021-05-14 DIAGNOSIS — S66.812A RUPTURE OF EXTENSOR TENDONS OF LEFT HAND AND WRIST, INITIAL ENCOUNTER: Primary | ICD-10-CM

## 2021-05-14 PROCEDURE — 97018 PARAFFIN BATH THERAPY: CPT | Performed by: OCCUPATIONAL THERAPIST

## 2021-05-14 PROCEDURE — 97140 MANUAL THERAPY 1/> REGIONS: CPT | Performed by: OCCUPATIONAL THERAPIST

## 2021-05-14 PROCEDURE — 97530 THERAPEUTIC ACTIVITIES: CPT | Performed by: OCCUPATIONAL THERAPIST

## 2021-05-14 PROCEDURE — 97110 THERAPEUTIC EXERCISES: CPT | Performed by: OCCUPATIONAL THERAPIST

## 2021-05-14 NOTE — PROGRESS NOTES
[address] [place] [address]    OCCUPATIONAL THERAPY PROGRESS NOTE    Date:  2021  Initial Evaluation Date: 21    Patient Name:  Tristen Byers    :  1965  Restrictions/Precautions: Follow flexor tendon/  AROM as tolerated - 21, low fall risk  Diagnosis:  Thumb FPL rupture/repair L hand   S66.819A (ICD-10-CM) - Rupture of flexor tendon of hand, initial encounter                                                             Insurance/Certification information:  [facility]   Referring Practitioner:  Dr.A Angelita Lama  Date of Surgery/Injury: 3/26/2021 (LMF DIP fusion, L FPL reconstruction with harvest extensor indicis)  Plan of care signed (Y/N): Y (thru 21)  Visit# / total visits: 10 / 16    Pain Level: 1/10 at rest, 5-6/10 in the MP with ROM  Subjective: \" My thumb hurts. I can  things better but it hurts at the base of the thumb. \"  Objective:  Updated POC to be completed by visit 10.     INTERVENTION: COMPLETED: SPECIFICS/COMMENTS:   Modality:     Paraffin x hand/ wrist x 10 min prior to  Massage and ROM        ROM exercise     Thumb AROM  x All planes as tolerated, blocking ex at IP/ MP, towel ex with thumb   Wrist AROM x  AROM All planes as tolerated, Jux-A-ciser   Hand AROM x Towel ex   PROM/Stretching thumb             x All planes with focus on thumb extension as tolerated with stabilization at the MP        Fine motor exercises     Prehension ex            x coins   Manipulation ex            x -[place]/ marbles in hand manipulation  - screw manipulation   Manual techniques:     scar massage x    Soft tissue mob x Focus on the L web space and palmar hand   Strengthening:               Other:     HEP x Scar massage, thumb/ wrist AROM, passive thumb  flexion and hold   Splinting X  second Attempt splint to stabilize MP only   Hand based splint updated again to further increase radial extension in the thumb   Splinting x Pt was fit with a small comfort cool CMC thumb splint for use at 8 weeks to support the [address] and minimize CMC pain     Assessment/Comments: Pt is making Fair + progress toward stated plan of care. Hyperextension at the MP continues to hinder progress. The more the pt uses her thumb the more the MP hurts due to compensation for [address] arthritic limitations. Attempts at splinting the MP only have been unsuccessful at this point. The MP is supported with KT tape to help provide some support to minimize soreness. Will collaborate with a colleague to further address splinting needs.     -Rehab Potential: Good  -Requires OT Follow Up: Yes  Time In: 0730            Time Out: 0825             Visit #: 10    Treatment Charges: Mins Units   Modalities: paraffin 10 1   Ther Exercise 15 1   Manual Therapy 10 1   Thera Activities 20 1   ADL/Home Mgt      Neuro Re-education     Group Therapy     Non-Billable Service Time     Other: ortho fit     Total Time/Units 55 4       -Response to Treatment: Pt is very frustarted about the arthritic limitations in her thumb. Support provided. Goals: Goals for pt can be seen on initial eval occurring on 4-12-21    Plan:   [x]  Continue Plan of care: Hold re-evaluation til next session. Trial fluidotherapy to help with sensitivity. Continue AROM / stretches as tolerated, splinting, and scar mgmt. Pt education continues at each visit to obtain maximum benefits from skilled OT intervention.   []  [facility] of care:   []  Discharge:      Arianna Conn OT/L  [id number]

## 2021-05-17 ENCOUNTER — TREATMENT (OUTPATIENT)
Dept: OCCUPATIONAL THERAPY | Age: 56
End: 2021-05-17
Payer: COMMERCIAL

## 2021-05-17 DIAGNOSIS — S66.812A RUPTURE OF EXTENSOR TENDONS OF LEFT HAND AND WRIST, INITIAL ENCOUNTER: Primary | ICD-10-CM

## 2021-05-17 PROCEDURE — 97110 THERAPEUTIC EXERCISES: CPT | Performed by: OCCUPATIONAL THERAPIST

## 2021-05-17 PROCEDURE — 97760 ORTHOTIC MGMT&TRAING 1ST ENC: CPT | Performed by: OCCUPATIONAL THERAPIST

## 2021-05-17 NOTE — PROGRESS NOTES
[address], Abhinav Shaw    OCCUPATIONAL THERAPY PROGRESS NOTE    Date:  2021  Initial Evaluation Date: 21    Patient Name:  Lis Sykes    :  1965  Restrictions/Precautions: Follow flexor tendon/  AROM as tolerated - 21, low fall risk  Diagnosis:  Thumb FPL rupture/repair L hand   S66.819A (ICD-10-CM) - Rupture of flexor tendon of hand, initial encounter                                                             Insurance/Certification information:  [facility]   Referring Practitioner:  Dr.A [facility]  Date of Surgery/Injury: 3/26/2021 (LMF DIP fusion, L FPL reconstruction with harvest extensor indicis)  Plan of care signed (Y/N): Y (thru 21)  Visit# / total visits:     Pain Level: 1/10 at rest, 5-6/10 in the MP with ROM  Subjective: \"The tendon sx went well - but my other 2 thumb joints really bother me. \"  Objective:  Updated POC to be completed by visit 12. INTERVENTION: COMPLETED: SPECIFICS/COMMENTS:   Modality:     Paraffin x hand/ wrist  10 min prior to  Massage and ROM        ROM exercise     Thumb AROM  x All planes as tolerated, blocking ex at IP/ MP, towel ex with thumb. Did thumb IP flex ext in 3 planes - in the palm, neutral and with thumb extended.     Wrist AROM   AROM All planes as tolerated, Jux-A-ciser   Hand AROM  Towel ex   PROM/Stretching thumb              All planes with focus on thumb extension as tolerated with stabilization at the MP        Fine motor exercises     Prehension ex             coins   Manipulation ex  -[place]/ marbles in hand manipulation  - screw manipulation   Manual techniques:     scar massage     Soft tissue mob  Focus on the L web space and palmar hand   Strengthening:               Other:     HEP x Scar massage, thumb/ wrist AROM, passive thumb  flexion and hold   Splinting X  third Attempt splint to stabilize MP only   Therapist fabricated a thumb MP flexion splint - hand based - allowing wrist motion, thumb IP and slight CMC motion. Pt was able to don and doff. It's questionable if it will allow enough function or if it is comfortable to wear. Pt will try them ( 2 fabricated out of different material). Splinting x Pt was fit with a small comfort cool CMC thumb splint for use at 8 weeks to support the [facility] and minimize CMC pain     Assessment/Comments: Pt is making Fair + progress toward stated plan of care. Session today spent on fabrication of a MP flexion splint to prevent her MP from hyperextending. 2 hand based were fabricated. Whether they allow enough function or are comfortable is questionable. Pt till try at home and let therapist know. -Rehab Potential: Good  -Requires OT Follow Up: Yes  Time In:  4:00 pm            Time Out:  5:00 pm            Visit #: 11    Treatment Charges: Mins Units   Modalities: paraffin     Ther Exercise 15 1   Manual Therapy     Thera Activities     ADL/Home Mgt      Neuro Re-education     Group Therapy     Non-Billable Service Time     Other: ortho fit 45 3   Total Time/Units 60 4       -Response to Treatment: Pt is very frustarted about the arthritic limitations in her thumb. Support provided. Goals: Goals for pt can be seen on initial eval occurring on 4-12-21    Plan:   [x]  Continue Plan of care: Hold re-evaluation til next session. Trial fluidotherapy to help with sensitivity. Continue AROM / stretches as tolerated, splinting, and scar mgmt. Pt education continues at each visit to obtain maximum benefits from skilled OT intervention.   []  Alter Plan of care:   []  Discharge:      Merced Lynn, OT OT/L,  CHT

## 2021-05-21 ENCOUNTER — TREATMENT (OUTPATIENT)
Dept: OCCUPATIONAL THERAPY | Age: 56
End: 2021-05-21
Payer: COMMERCIAL

## 2021-05-21 DIAGNOSIS — S66.812A RUPTURE OF EXTENSOR TENDONS OF LEFT HAND AND WRIST, INITIAL ENCOUNTER: Primary | ICD-10-CM

## 2021-05-21 PROCEDURE — 97018 PARAFFIN BATH THERAPY: CPT | Performed by: OCCUPATIONAL THERAPIST

## 2021-05-21 PROCEDURE — 97530 THERAPEUTIC ACTIVITIES: CPT | Performed by: OCCUPATIONAL THERAPIST

## 2021-05-21 PROCEDURE — 97140 MANUAL THERAPY 1/> REGIONS: CPT | Performed by: OCCUPATIONAL THERAPIST

## 2021-05-21 PROCEDURE — 97110 THERAPEUTIC EXERCISES: CPT | Performed by: OCCUPATIONAL THERAPIST

## 2021-05-21 NOTE — PROGRESS NOTES
[address] [place] [address]    OCCUPATIONAL THERAPY   PROGRESS NOTE/ STATUS UPDATE    Date:  2021  Initial Evaluation Date: 21    Patient Name:  Karma Arora    :  1965  Restrictions/Precautions: Follow flexor tendon/  AROM as tolerated - 21, low fall risk  Diagnosis:  Thumb FPL rupture/repair L hand   S66.819A (ICD-10-CM) - Rupture of flexor tendon of hand, initial encounter                                                             Insurance/Certification information:  [facility]   Referring Practitioner:  Dr.A Shadi Sheridan  Date of Surgery/Injury: 3/26/2021 (LMF DIP fusion, L FPL reconstruction with harvest extensor indicis)  Plan of care signed (Y/N): Y (thru 21)  Visit# / total visits:     Pain Level: 1/10 at rest, 5-6/10 in the MP with ROM  Subjective: \"Those braces aren't going to work. They hurt the tender skin and are too rigid. \"  Objective:  Updated POC to be completed by visit 12. INTERVENTION: COMPLETED: SPECIFICS/COMMENTS:   Modality:     Paraffin x hand/ wrist X 10 min prior to  Massage and ROM        ROM exercise     Thumb AROM  x All planes as tolerated, blocking ex at IP/ MP, towel ex with thumb. Did thumb IP flex ext in 3 planes - in the palm, neutral and with thumb extended.     Wrist AROM X  AROM All planes as tolerated, Jux-A-ciser   Hand AROM X Towel ex   PROM/Stretching thumb             X All planes with focus on thumb extension as tolerated with stabilization at the MP        Fine motor exercises     Prehension ex             coins   Manipulation ex  -[place]/ marbles in hand manipulation  - screw manipulation   Manual techniques:     scar massage X    Soft tissue mob  Focus on the L web space and palmar hand   Strengthening:     Hand strengthening x - xsoft putty (gentle gripping 2 min, rolling for digital extension 4x)        Other:     HEP x Scar massage, thumb/ wrist AROM, passive thumb  flexion and hold   Splinting X  Not succesSful Therapist fabricated a thumb MP flexion splint - hand based - allowing wrist motion, thumb IP and slight CMC motion. Pt was not able to tolerate the splint and presented with pressure points under the MP. Pt opts to use the hand splint that promotes radial extension and a softer thumb spica splint. Splinting x Pt was fit with a small comfort cool CMC thumb splint for use at 8 weeks to support the [facility] and minimize CMC pain     Assessment/Comments: Pt is making Fair + progress toward stated plan of care. Pt continues to exhibit limitations at the L thumb CM due to arthritis and significant MP subluxation to compensation for the [facility] issues. This has greatly affected her thumb function and has been difficult to manage. IP ROM is good but hand strength is decreased. Pt is for follow up with her MD next week. Will adjust plan as tolerated with a focus on hand strengthening. L Thumb AROM:   Palmar ABD:  0-45  Radial extension:  0-25 with out MP subluxation ( increases to 35 degree hyperextension with thumb use)  IP 0-60 ( limited in extension when MP subluxes    L Wrist AROM:  Flexion 0-78  Extension 0-50    - not able with the left hand due to [facility] pain   (R- 30#)    Lateral pinch- 3# with mild MP discomfort  3 jaw- 4# with CMC pain      -Rehab Potential: Good  -Requires OT Follow Up: Yes  Time In:  0730          Time Out: 0825             Visit #: 12    Treatment Charges: Mins Units   Modalities: paraffin 10 1   Ther Exercise 20 1   Manual Therapy 10 1   Thera Activities 15 1   ADL/Home Mgt      Neuro Re-education     Group Therapy     Non-Billable Service Time     Other: ortho fit     Total Time/Units 55 4       -Response to Treatment: Pt is very frustarted about the limitations in her thumb. Support provided.    Goals: Goals for pt can be seen on initial eval occurring on 4-12-21    Plan:   [x]  Continue Plan of care: Continue AROM / stretches as tolerated, light strengthening, and scar mgmt.   Pt education continues at each visit to obtain maximum benefits from skilled OT intervention.   []  [address] of care:   []  Discharge:      Breezy Dill OT/L  [id number]

## 2021-05-25 ENCOUNTER — OFFICE VISIT (OUTPATIENT)
Dept: ORTHOPEDIC SURGERY | Age: 56
End: 2021-05-25

## 2021-05-25 VITALS — HEIGHT: 60 IN | BODY MASS INDEX: 21.6 KG/M2 | WEIGHT: 110 LBS | RESPIRATION RATE: 22 BRPM

## 2021-05-25 DIAGNOSIS — S66.819A: Primary | ICD-10-CM

## 2021-05-25 PROCEDURE — 99024 POSTOP FOLLOW-UP VISIT: CPT | Performed by: ORTHOPAEDIC SURGERY

## 2021-05-26 ENCOUNTER — TREATMENT (OUTPATIENT)
Dept: OCCUPATIONAL THERAPY | Age: 56
End: 2021-05-26
Payer: COMMERCIAL

## 2021-05-26 DIAGNOSIS — S66.819A: Primary | ICD-10-CM

## 2021-05-26 PROCEDURE — 97018 PARAFFIN BATH THERAPY: CPT | Performed by: OCCUPATIONAL THERAPIST

## 2021-05-26 PROCEDURE — 97110 THERAPEUTIC EXERCISES: CPT | Performed by: OCCUPATIONAL THERAPIST

## 2021-05-26 PROCEDURE — 97530 THERAPEUTIC ACTIVITIES: CPT | Performed by: OCCUPATIONAL THERAPIST

## 2021-05-26 PROCEDURE — 97140 MANUAL THERAPY 1/> REGIONS: CPT | Performed by: OCCUPATIONAL THERAPIST

## 2021-05-26 NOTE — PROGRESS NOTES
[address] [place] [address]    OCCUPATIONAL THERAPY   PROGRESS NOTE    Date:  2021  Initial Evaluation Date: 21    Patient Name:  Curtis West    :  1965  Restrictions/Precautions: Follow flexor tendon/  AROM as tolerated - 21, low fall risk  Diagnosis:  Thumb FPL rupture/repair L hand   S66.819A (ICD-10-CM) - Rupture of flexor tendon of hand, initial encounter                                                             Insurance/Certification information:  [facility]   Referring Practitioner:  Dr.A Richa Reese  Date of Surgery/Injury: 3/26/2021 (LMF DIP fusion, L FPL reconstruction with harvest extensor indicis)  Plan of care signed (Y/N): Y (thru 21)  Visit# / total visits:     Pain Level: No pain at rest, 4/10 in the MP/ Aia 16 with resistive activity  Subjective: \" The doctor was happy with the tendon but I need surgery on the joints\". Objective:  Updated POC to be completed by visit 12. INTERVENTION: COMPLETED: SPECIFICS/COMMENTS:   Modality:     Paraffin x hand/ wrist X 10 min prior to  Massage and ROM        ROM exercise     Thumb AROM  x All planes as tolerated, blocking ex at IP/ MP, towel ex with thumb.  Did thumb IP flex /ext   Wrist AROM X  AROM All planes as tolerated, Jux-A-ciser   Hand AROM  Towel ex   PROM/Stretching thumb             X All planes with focus on thumb extension as tolerated with stabilization at the MP        Fine motor exercises     Prehension ex             coins   Manipulation ex              x -[place]/ marbles in hand manipulation  - screw manipulation  - exercise balls   Manual techniques:     scar massage X    Soft tissue mob  Focus on the L web space and palmar hand   Strengthening:     Hand strengthening X  xsoft putty -gentle gripping 2 min  - rolling for digital extension 4x  - taffy pull 15x up/ down  - thumb ADD x 10  - roll putty and ppinch using tip/ 3 jaw prehension        Other:     HEP x Scar massage, thumb/ wrist AROM, passive thumb  flexion and hold   Splinting x   Pt to use hand based thermoplastic thumb extension splint for heavy activity and comfort cool splint as needed. Otherwise, all all other splints are discontinued. Splinting x Pt was fit with a small comfort cool CMC thumb splint for use at 8 weeks to support the [facility] and minimize CMC pain     Assessment/Comments: Tx continued with a focus on manipulation skills and with hand strengthening. Tolerance for hand strengthening is decreased due to CMC/ MP issues. Care taken to avoid straining these joints. Will continue as tolerated. -Rehab Potential: Good  -Requires OT Follow Up: Yes  Time In:  0730          Time Out: 0820             Visit #: 13    Treatment Charges: Mins Units   Modalities: paraffin 10 1   Ther Exercise 15 1   Manual Therapy 10 1   Thera Activities 15 1   ADL/Home Mgt      Neuro Re-education     Group Therapy     Non-Billable Service Time     Other: ortho fit     Total Time/Units 50 4       -Response to Treatment:   Goals: Goals for pt can be seen on initial eval occurring on 4-12-21    Plan:   [x]  Continue Plan of care: Continue AROM / stretches as tolerated and  light strengthening. Pt education continues at each visit to obtain maximum benefits from skilled OT intervention.   []  [address] of care:   []  Discharge:      Melissa Monique OT/L  [id number]

## 2021-05-27 ENCOUNTER — TREATMENT (OUTPATIENT)
Dept: PHYSICAL THERAPY | Age: 56
End: 2021-05-27
Payer: COMMERCIAL

## 2021-05-27 DIAGNOSIS — S46.011A TRAUMATIC COMPLETE TEAR OF RIGHT ROTATOR CUFF, INITIAL ENCOUNTER: Primary | ICD-10-CM

## 2021-05-27 DIAGNOSIS — M75.21 BICEPS TENDONITIS ON RIGHT: ICD-10-CM

## 2021-05-27 PROCEDURE — 97530 THERAPEUTIC ACTIVITIES: CPT

## 2021-05-27 PROCEDURE — 97110 THERAPEUTIC EXERCISES: CPT

## 2021-05-27 NOTE — PROGRESS NOTES
Green 2 x 20 R arm only   new 4/20/2021  ta   ROWS: M Green 2 x 20 R arm only  \" ta   ROWS: L Green 2 x 20 R arm only  \" ta   ER  red  2 x 20 R arm only  \" ta   IR Red 2 x 20 R arm only   \" ta   Bicep curls  Red  2 x 20 R arm only   ta   Tricep exts  Red  2 x 20 R arm only   ta   Punches Green 2 x 20 R arm only   ta   Shoulder Press      Shoulder Flex      Shoulder ABD            Weighted Machines      Lat Pull Down      Vertical Row      A:  Tolerated well . Pt motivated to progress . P: Continue with rehab plan.   Isai De Souza PTA    Treatment Charges: Mins Units   Initial Evaluation     Re-Evaluation     Ther Exercise         TE 10 1   Manual Therapy     MT     Ther Activities        TA 30 2   Gait Training          GT     Neuro Re-education NR     Modalities     Non-Billable Service Time     Other     Total Time/Units 40 3

## 2021-06-08 ENCOUNTER — TREATMENT (OUTPATIENT)
Dept: PHYSICAL THERAPY | Age: 56
End: 2021-06-08
Payer: COMMERCIAL

## 2021-06-08 DIAGNOSIS — S46.011A TRAUMATIC COMPLETE TEAR OF RIGHT ROTATOR CUFF, INITIAL ENCOUNTER: Primary | ICD-10-CM

## 2021-06-08 PROCEDURE — 97110 THERAPEUTIC EXERCISES: CPT

## 2021-06-08 PROCEDURE — 97530 THERAPEUTIC ACTIVITIES: CPT

## 2021-06-08 NOTE — PROGRESS NOTES
Physical Therapy Daily Treatment Note    Date: 2021  Patient Name: Sylvain Castañeda  : 1965   MRN: [id number]  DOInjury: 2020  DOSx: 2021 RIGHT SHOULDER ROTATOR CUFF TEAR, BICEPS TENODESIS  Referring Provider:  Dmitry Jaquez APRN - [address] [facility] [address] [place] [address]             Medical Diagnosis:      Diagnosis Orders   1. Traumatic complete tear of right rotator cuff, initial encounter       NO STRENGTHENING UNTIL !!! Outcome Measure: Quick Dash: 52% Impairment  Mid point score 4/15/2021= 52%     S: Pt reports doing very good with her range of motion but just still has difficulty with  Activities like putting dishes away in a cupboard at home . ( sx on L hand thumb on 2021 )    Time 2432-6206      Visit -  Repeat outcome measure at mid point and end.     Pain 3-4   /10     /70/toT 12  2021           NOT performed  mo               Manual   mt               Stretch      Table slides HEP TE   Wall Flex stretch  HEP    Wall ABD stretch  HEP    Wall Pec/ER Stretch     Wall ER Stretch HEP    Towel IR Stretch        Cross Body Adduction      Supine Stretch with Arms Behind Head            Exercise      UBE    Fwds/bkds    3/3    TE   Pulleys - Flex  TE   Pulleys - IR  TE        Standing   Flex 1# 2 x 15  TE   Standing  Press ups  1# 2 x 20   TE   Supine Wand ER/IR   TE   Standing Wand IR  TE   Seated straight  press ups      Standing Wand Scaption  TE   Standing Wand Flex      Standing Wand ER/IR      Shrugs AROM       Pendulum Ex   TE         Supine Flex      S-lying ABD      S-lying ER            Prone Flexion      Prone Ext      Prone Row with ER      Prone Horizontal ABD            Standing Flex      Standing ER/IR      Standing Scaption       Standing ABD      Standing Shoulder Press       Functional activities To aid in ROM and strength needed for reaching , lifting ,pushing and pulling at home/work    ROWS: H Green 2 x 20 R arm only   new 4/20/2021  ta   ROWS: Deepika Goldsmith 2 x 20 R arm only  \" ta   ROWS: L Green 2 x 20 R arm only  \" ta   ER  red  2 x 20 R arm only  \" ta   IR Red 2 x 20 R arm only   \" ta   Bicep curls  Red  2 x 20 R arm only   ta   Tricep exts  Red  2 x 20 R arm only   ta   Punches Green 2 x 20 R arm only   ta   Shoulder Press      Shoulder Flex      Shoulder ABD            Weighted Machines      Lat Pull Down      Vertical Row      A:  Tolerated well . Emphasis on activities with light wt to simulate kitchen ADL's . ( putting away dishes ). Pt to use light soup can at home for ex also . Mely Jerry P: Continue with rehab plan.   Shay Ortega PTA    Treatment Charges: Mins Units   Initial Evaluation     Re-Evaluation     Ther Exercise         TE 10 1   Manual Therapy     MT     Ther Activities        TA 20 1   Gait Training          GT     Neuro Re-education NR     Modalities     Non-Billable Service Time     Other     Total Time/Units 30 2

## 2021-06-09 ENCOUNTER — TREATMENT (OUTPATIENT)
Dept: OCCUPATIONAL THERAPY | Age: 56
End: 2021-06-09
Payer: COMMERCIAL

## 2021-06-09 DIAGNOSIS — S66.819A: Primary | ICD-10-CM

## 2021-06-09 PROCEDURE — 97530 THERAPEUTIC ACTIVITIES: CPT | Performed by: OCCUPATIONAL THERAPIST

## 2021-06-09 PROCEDURE — 97018 PARAFFIN BATH THERAPY: CPT | Performed by: OCCUPATIONAL THERAPIST

## 2021-06-09 PROCEDURE — 97110 THERAPEUTIC EXERCISES: CPT | Performed by: OCCUPATIONAL THERAPIST

## 2021-06-09 NOTE — PROGRESS NOTES
thermoplastic thumb extension splint for heavy activity and comfort cool splint as needed. Otherwise, all all other splints are discontinued. Splinting x Pt was fit with a small comfort cool CMC thumb splint for use at 8 weeks to support the [facility] and minimize CMC pain     Assessment/Comments:  Pt feels she is doing well within the limits of arthritis in the [facility] and MP of the left thumb. She is using her hand more for self care, homemaking and work related activities. Status is as stated below. Last re-eval completed on 5-21. L Thumb AROM:  Palmar ABD:  0-45  Radial extension:  0-25 with out MP subluxation ( increases to 35 degree hyperextension with thumb use)  IP 0-60 ( limited in extension when MP subluxes     L Wrist AROM:  Flexion 0-78  Extension 0-50     - not able with the left hand due to [facility] pain   (R- 30#)     Lateral pinch- 3# with mild MP discomfort  3 jaw- 4# with CMC pain    GOALS (Long term same as Short term):  1. ) Patient will demonstrate good understanding of home program (exercises/activities/diagnosis/prognosis/goals) with good accuracy.  ( goal met)    2. ) Patient will demonstrate increased active/passive range of motion of their Left wrist, thumb and digits to [facility] for ADL/IADL completion. (good progress)    3. ) Patient will demonstrate increased /pinch strength of at least 10 / 5 pinch pounds of their Left hand. (goal not met due to pain in the left hand with resistive activity)    4. ) Patient to report decreased pain in their affected Left  upper extremity from 4/10 to 1/10 or less with resistive functional use. (progress noted- pain averages from 0-3 pending the activity)    5. ) Increase in fine motor function as evidenced by decreased time to complete 9-hole peg test by at least 5-10 seconds. (goal met- None hole in 23 sec)    6. ) Patient to report 100% compliance with their splint wear, care, and precautions if needed. (goal met)    7. ) Patient will be knowledgeable

## 2021-06-17 ENCOUNTER — TELEPHONE (OUTPATIENT)
Dept: PHYSICAL THERAPY | Age: 56
End: 2021-06-17

## 2021-06-22 ENCOUNTER — TREATMENT (OUTPATIENT)
Dept: PHYSICAL THERAPY | Age: 56
End: 2021-06-22
Payer: COMMERCIAL

## 2021-06-22 DIAGNOSIS — S46.011A TRAUMATIC COMPLETE TEAR OF RIGHT ROTATOR CUFF, INITIAL ENCOUNTER: Primary | ICD-10-CM

## 2021-06-22 PROCEDURE — 97530 THERAPEUTIC ACTIVITIES: CPT

## 2021-06-22 PROCEDURE — 97110 THERAPEUTIC EXERCISES: CPT

## 2021-06-22 NOTE — PROGRESS NOTES
Physical Therapy Daily Treatment Note    Date: 2021  Patient Name: Shalom Canales  : 1965   MRN: [id number]  DOInjury: 2020  DOSx: 2021 RIGHT SHOULDER ROTATOR CUFF TEAR, BICEPS TENODESIS  Referring Provider:  MARS Josue - [address] [facility] [address] [place] [address]             Medical Diagnosis:      Diagnosis Orders   1. Traumatic complete tear of right rotator cuff, initial encounter       NO STRENGTHENING UNTIL !!! Outcome Measure: Quick Dash: 52% Impairment  Mid point score 4/15/2021= 52%     S: Pt reports still having soreness/pain in biceps mm area. . ( sx on L hand thumb on 2021 )    Time 7149-8729      Visit -  Repeat outcome measure at mid point and end.     Pain 3-4   /10     /70/toT 12  2021           NOT performed  mo               Manual   mt               Stretch      Table slides HEP TE   Wall Flex stretch  HEP    Wall ABD stretch  HEP    Wall Pec/ER Stretch     Wall ER Stretch HEP    Towel IR Stretch        Cross Body Adduction      Supine Stretch with Arms Behind Head            Exercise      UBE    Fwds/bkds    3/3    TE   Pulleys - Flex  TE   Pulleys - IR  TE        Standing   Flex 1# 2 x 15  TE   Standing  Press ups  1# 2 x 20   TE   Supine Wand ER/IR   TE   Standing Wand IR  TE   Seated straight  press ups      Standing Wand Scaption  TE   Standing Wand Flex      Standing Wand ER/IR      Shrugs AROM       Pendulum Ex   TE         Supine Flex      S-lying ABD      S-lying ER            Prone Flexion      Prone Ext      Prone Row with ER      Prone Horizontal ABD            Standing Flex      Standing ER/IR      Standing Scaption       Standing ABD      Standing Shoulder Press       Functional activities To aid in ROM and strength needed for reaching , lifting ,pushing and pulling at home/work    ROWS: H Green 2 x 20 R arm only   new 2021  ta   ROWS: M Green 2 x 20 R arm only  \" ta   ROWS: L Green 2 x 20 R arm only  \" ta   ER  red  2 x 20 R arm only  \" ta   IR Green 2 x 20 R arm only   \" ta   Bicep curls  Green   2 x 20 R arm only   ta   Tricep exts  Green   2 x 20 R arm only   ta   Punches Green 2 x 20 R arm only   ta   Shoulder Press      Shoulder Flex      Shoulder ABD            Weighted Machines      Lat Pull Down      Vertical Row      A:  Tolerated well . Pt able to progress to green t tubing with tricep exts, bicep curls, and IR . Pt however continues to have  pain at end range of motions in biceps  . P: Continue with rehab plan.   Livia Herrera PTA    Treatment Charges: Mins Units   Initial Evaluation     Re-Evaluation     Ther Exercise         TE 10 1   Manual Therapy     MT     Ther Activities        TA 20 1   Gait Training          GT     Neuro Re-education NR     Modalities     Non-Billable Service Time     Other     Total Time/Units 30 2

## 2021-07-01 ENCOUNTER — TREATMENT (OUTPATIENT)
Dept: PHYSICAL THERAPY | Age: 56
End: 2021-07-01
Payer: COMMERCIAL

## 2021-07-01 DIAGNOSIS — S46.011A TRAUMATIC COMPLETE TEAR OF RIGHT ROTATOR CUFF, INITIAL ENCOUNTER: Primary | ICD-10-CM

## 2021-07-01 PROCEDURE — 97530 THERAPEUTIC ACTIVITIES: CPT

## 2021-07-01 PROCEDURE — 97110 THERAPEUTIC EXERCISES: CPT

## 2021-07-01 NOTE — PROGRESS NOTES
Physical Therapy Daily Treatment Note    Date: 2021  Patient Name: Emily Arteaga  : 1965   MRN: [id number]  DOInjury: 2020  DOSx: 2021 RIGHT SHOULDER ROTATOR CUFF TEAR, BICEPS TENODESIS  Referring Provider:  MARS Bellamy - [facility] [address] [place] [address]             Medical Diagnosis:      Diagnosis Orders   1. Traumatic complete tear of right rotator cuff, initial encounter       NO STRENGTHENING UNTIL !!! Outcome Measure: Quick Dash: 52% Impairment  Mid point score 4/15/2021= 52%     S: Pt reports doing better overall but still would like to be even better and stronger . ( sx on L hand thumb on 2021 )    Time 5430-5589      Visit -  Repeat outcome measure at mid point and end.     Pain 3-4   /10     /70/toT 12  2021           NOT performed  mo               Manual   mt               Stretch      Table slides HEP TE   Wall Flex stretch  HEP    Wall ABD stretch  HEP    Wall Pec/ER Stretch     Wall ER Stretch HEP    Towel IR Stretch        Cross Body Adduction      Supine Stretch with Arms Behind Head            Exercise      UBE    Fwds/bkds    3/3    TE   Pulleys - Flex  TE   Pulleys - IR  TE        Standing   Flex 1# 2 x 15  TE   Standing  Press ups  1# 2 x 20   TE   Supine Wand ER/IR   TE   Standing Wand IR  TE   Seated straight  press ups      Standing Wand Scaption  TE   Standing Wand Flex      Standing Wand ER/IR      Shrugs AROM       Pendulum Ex   TE         Supine Flex      S-lying ABD      S-lying ER            Prone Flexion      Prone Ext      Prone Row with ER      Prone Horizontal ABD            Standing Flex      Standing ER/IR      Standing Scaption       Standing ABD      Standing Shoulder Press       Functional activities To aid in ROM and strength needed for reaching , lifting ,pushing and pulling at home/work    ROWS: H Green 2 x 20 R arm only   new 2021  ta   ROWS: M Green 2 x 20 R arm only  \" ta   ROWS: L Green 2 x 20 R arm only  \" ta   ER  red  2 x 20 R arm only  \" ta   IR Green 2 x 20 R arm only   \" ta   Bicep curls  Green   2 x 20 R arm only   ta   Tricep exts  Green   2 x 20 R arm only   ta   Punches Green 2 x 20 R arm only   ta   Shoulder Press      Shoulder Flex Red   2 x 20 R arm only  New     Shoulder ABD            Weighted Machines      Lat Pull Down      Vertical Row      A:  Tolerated well . Pt able to . Pt however continues to have  pain at end range of motions in biceps  . P: Continue with rehab plan.   Liza Jacinto PTA    Treatment Charges: Mins Units   Initial Evaluation     Re-Evaluation     Ther Exercise         TE 10 1   Manual Therapy     MT     Ther Activities        TA 20 1   Gait Training          GT     Neuro Re-education NR     Modalities     Non-Billable Service Time     Other     Total Time/Units 30 2

## 2021-07-06 ENCOUNTER — OFFICE VISIT (OUTPATIENT)
Dept: ORTHOPEDIC SURGERY | Age: 56
End: 2021-07-06
Payer: COMMERCIAL

## 2021-07-06 VITALS — WEIGHT: 110 LBS | TEMPERATURE: 98 F | BODY MASS INDEX: 21.6 KG/M2 | HEIGHT: 60 IN

## 2021-07-06 DIAGNOSIS — M75.21 BICEPS TENDONITIS ON RIGHT: ICD-10-CM

## 2021-07-06 DIAGNOSIS — S46.011A TRAUMATIC COMPLETE TEAR OF RIGHT ROTATOR CUFF, INITIAL ENCOUNTER: Primary | ICD-10-CM

## 2021-07-06 DIAGNOSIS — S43.431A TEAR OF RIGHT GLENOID LABRUM, INITIAL ENCOUNTER: ICD-10-CM

## 2021-07-06 PROCEDURE — 1036F TOBACCO NON-USER: CPT | Performed by: ORTHOPAEDIC SURGERY

## 2021-07-06 PROCEDURE — 99213 OFFICE O/P EST LOW 20 MIN: CPT | Performed by: ORTHOPAEDIC SURGERY

## 2021-07-06 PROCEDURE — G8420 CALC BMI NORM PARAMETERS: HCPCS | Performed by: ORTHOPAEDIC SURGERY

## 2021-07-06 PROCEDURE — 3017F COLORECTAL CA SCREEN DOC REV: CPT | Performed by: ORTHOPAEDIC SURGERY

## 2021-07-06 PROCEDURE — G8427 DOCREV CUR MEDS BY ELIG CLIN: HCPCS | Performed by: ORTHOPAEDIC SURGERY

## 2021-07-06 NOTE — PROGRESS NOTES
Chief Complaint   Patient presents with    Shoulder Pain     f/u right shoulder RTC DOS: 1/22/21       Bing Tan returns today for follow up of her right shoulder arthroscopy with rtc repair. she reports that the pain in the shoulder is better. she has been going to physical therapy. she is also complaining of intermittent pains. The patient is right dominant. The patient's pain level is a 2/10. The patient did respond to treatment.     Past Medical History:   Diagnosis Date    Depression     Hyperlipidemia     slight not on meds    OA (osteoarthritis) of finger     hands    PONV (postoperative nausea and vomiting)     with anesthesia     Past Surgical History:   Procedure Laterality Date    CHOLECYSTECTOMY  10/2007    COLONOSCOPY  08/09/2011    DODIG    FRACTURE SURGERY  aug 2012    open reduction internal fixation left wrist    HAND SURGERY Left 3/26/2021    LEFT WRIST HARDWARE REMOVAL performed by Ca Cam MD at [address] [facility] Left 3/26/2021    FLEXOR TENOLYSIS LEFT THUMB FLEXOR TENDON RECONSTRUCTION WITH AUTOGRAFT  TENDON TRANSFER performed by Ca Cam MD at [facility] [place] ARTHROSCOPY Right 1/22/2021    RIGHT SHOULDER ARTHROSCOPY, SUBACROMIAL DECOMPRESSION, ROTATOR CUFF REPAIR AND DEBRIDEMENT ([facility]) performed by Opal Samuel DO at [address] [address]  08/09/2011       Current Outpatient Medications:     valACYclovir (VALTREX) 500 MG tablet, Take 1 tablet by mouth 2 times daily, Disp: 20 tablet, Rfl: 0    doxepin (SINEQUAN) 10 MG capsule, TAKE 1 CAPSULE BY MOUTH TWO TIMES DAILY (Patient taking differently: nightly ), Disp: 180 capsule, Rfl: 2    ibuprofen (ADVIL;MOTRIN) 800 MG tablet, Take 1 tablet by mouth every 6 hours as needed for Pain, Disp: 21 tablet, Rfl: 0    Multiple Vitamins-Minerals (HAIR SKIN AND NAILS FORMULA PO), Take 1 tablet by mouth daily, Disp: , Rfl:   Allergies   Allergen Reactions    Vicodin [Hydrocodone-Acetaminophen] Nausea And Vomiting     Social History     Socioeconomic History    Marital status:      Spouse name: Teo Robles    Number of children: 2    Years of education: BSN    Highest education level: Not on file   Occupational History    Occupation: RN   Tobacco Use    Smoking status: Never Smoker    Smokeless tobacco: Never Used   Vaping Use    Vaping Use: Never used   Substance and Sexual Activity    Alcohol use: Yes     Comment: rarely    Drug use: No    Sexual activity: Not on file   Other Topics Concern    Not on file   Social History Narrative    Not on file     Social Determinants of Health     Financial Resource Strain:     Difficulty of Paying Living Expenses:    Food Insecurity:     Worried About Running Out of Food in the Last Year:     [address] in the Last Year:    Transportation Needs:     Lack of Transportation (Medical):  Lack of Transportation (Non-Medical):    Physical Activity:     Days of Exercise per Week:     Minutes of Exercise per Session:    Stress:     Feeling of Stress :    Social Connections:     Frequency of Communication with Friends and Family:     Frequency of Social Gatherings with Friends and Family:     Attends Jehov[employer]'s witness Services:     Active Member of Clubs or Organizations:     Attends Club or Organization Meetings:     Marital Status:    Intimate Partner Violence:     Fear of Current or Ex-Partner:     Emotionally Abused:     Physically Abused:     Sexually Abused:      Family History   Problem Relation Age of Onset    Stroke Mother     Colon Cancer Father     Other Father     Cancer Sister         Breast    Other Sister         Murdered       REVIEW OF SYSTEMS:     General/Constitution:  (-)weight loss, (-)fever, (-)chills, (-)weakness. Skin: (-) rash,(-) psoriasis,(-) eczema, (-)skin cancer.    Musculoskeletal: (-) fractures,  (-) dislocations,(-) collagen vascular disease, (-) fibromyalgia, (-) multiple sclerosis, (-) muscular dystrophy, (-) RSD,(-) joint pain (-)swelling, (-) joint pain,swelling. Neurologic: (-) epilepsy, (-)seizures,(-) brain tumor,(-) TIA, (-)stroke, (-)headaches, (-)Parkinson disease,(-) memory loss, (-) LOC. Cardiovascular: (-) Chest pain, (-) swelling in legs/feet, (-) SOB, (-) cramping in legs/feet with walking. Respiratory: (-) SOB, (-) Coughing, (-) night sweats. GI: (-) nausea, (-) vomiting, (-) diarrhea, (-) blood in stool, (-) gastric ulcer. Psychiatric: (-) Depression, (-) Anxiety, (-) bipolar disease, (-) Alzheimer's Disease  Allergic/Immunologic: (-) allergies latex, (-) allergies metal, (-) skin sensitivity. Hematlogic: (-) anemia, (-) blood transfusion, (-) DVT/PE, (-) Clotting disorders    SUBJECTIVE:    Constitution:  The patient is alert and oriented x 3, appears to be stated age and in no distress. Temp 98 °F (36.7 °C)   Ht 5' (1.524 m)   Wt 110 lb (49.9 kg)   LMP 08/13/2012   BMI 21.48 kg/m²       Skin:  Upon inspection: the skin appears warm, dry and intact. There is  a previous scar over the affected area. There is not any cellulitis, lymphedema or cutaneous lesions noted in the lower extremities. Upon palpation there is no induration noted. Neurologic:  Gait: normal;  Motor exam of the upper extremities show: The reflexes in biceps/triceps/brachioradialis are equal and symmetric. Sensory exam C5-T1 are normal bilaterally. Cardiovascular: The vascular exam is normal and is well perfused to distal extremities. There are 2+ radial pulses bilaterally, and motor and sensation is intact to median, ulnar, and radial, musclocutaneus, and axillary nerve distribution and grossly symmetric bilaterally. There is cap refill noted less than two seconds in all digits. There is not edema of the bilateral upper extremities. There is not varicosities noted in the distal extremities.       Lymph:  Upon palpation,  there is no lymphadenopathy noted in bilateral upper extremities. Musculoskeletal:  Gait: normal; examination of the nails and digits reveal no cyanosis or clubbing. Cervical Exam:  On physical exam, Kerry Riedel is well-developed, well-nourished, oriented to person, place and time. her gait is normal.  On evaluation of her cervical spine, she has full range of motion of the cervical spine without pain. There is no cervical tenderness to palpation. Shoulder Exam:   On evaluation of her bilaterally upper extremities, her right shoulder has no deformity. There is no  tenderness upon palpation of the shoulder globally. There is not evidence of scapular dyskinesis. There is not muscle atrophy in shoulder girdle. The range of motion for the Right Shoulder is 150/45/t10 and for the Left shoulder is 15/045/t8. Right shoulder Motor strength is 5/5 in the supraspinatus, 5/5 internal rotation and 5/5 in external rotation, and Left shoulder motor strength 5/5 in supraspinatus, 5/5 in internal rotation, 5/5 in external rotation. Right shoulder:  negative Impingement , negative Hart ,negative  Speeds,negative  Apprehension ,negative Dick Load Shift, negative Arleth manuver, negative Cross arm test.     Left shoulder:  negative Impingement , negative Hart ,negative  Speeds,negative  Apprehension ,negative Dick Load Shift, negative Arleth manuver, negative Cross arm test.     X-ray:  n/a    MRI:  n/a    Radiographic findings reviewed with patient        Impression:       Encounter Diagnoses   Name Primary?  Traumatic complete tear of right rotator cuff, initial encounter Yes    Biceps tendonitis on right     Tear of right glenoid labrum, initial encounter        Plan:  Natural history and expected course discussed. Questions answered. Educational material distributed. Reduction in offending activity.   Gentle ROM exercises   Continue HEP  PT-finish  Discussed always being careful with op arm  Fu prn

## 2021-07-07 ENCOUNTER — TELEPHONE (OUTPATIENT)
Dept: PHYSICAL THERAPY | Age: 56
End: 2021-07-07

## 2021-07-27 ENCOUNTER — OFFICE VISIT (OUTPATIENT)
Dept: ORTHOPEDIC SURGERY | Age: 56
End: 2021-07-27
Payer: COMMERCIAL

## 2021-07-27 VITALS — WEIGHT: 110 LBS | RESPIRATION RATE: 20 BRPM | BODY MASS INDEX: 21.6 KG/M2 | HEIGHT: 60 IN

## 2021-07-27 DIAGNOSIS — M18.12 ARTHRITIS OF CARPOMETACARPAL (CMC) JOINT OF LEFT THUMB: Primary | ICD-10-CM

## 2021-07-27 PROCEDURE — G8420 CALC BMI NORM PARAMETERS: HCPCS | Performed by: ORTHOPAEDIC SURGERY

## 2021-07-27 PROCEDURE — 1036F TOBACCO NON-USER: CPT | Performed by: ORTHOPAEDIC SURGERY

## 2021-07-27 PROCEDURE — 99214 OFFICE O/P EST MOD 30 MIN: CPT | Performed by: ORTHOPAEDIC SURGERY

## 2021-07-27 PROCEDURE — G8427 DOCREV CUR MEDS BY ELIG CLIN: HCPCS | Performed by: ORTHOPAEDIC SURGERY

## 2021-07-27 PROCEDURE — 3017F COLORECTAL CA SCREEN DOC REV: CPT | Performed by: ORTHOPAEDIC SURGERY

## 2021-07-27 NOTE — PROGRESS NOTES
Department of Orthopedic Surgery  [facility] Matthew Mendoza MD  History and Physical      CHIEF COMPLAINT: Left thumb pain and weakness    HISTORY OF PRESENT ILLNESS:                The patient is a 60 y.o. female who presents with left thumb pain and weakness. She is 4 months postop FPL tendon reconstruction with palmaris longus graft. She is doing quite well with the tendon reconstruction and has full thumb IP joint motion. Her biggest complaint is pain at the base of the thumb Aia 16 joint as well as hyperextension deformity of the MCP joint. This limits her  strength and pinch strength. She would like to proceed with surgical intervention. .    Past Medical History:        Diagnosis Date    Depression     Hyperlipidemia     slight not on meds    OA (osteoarthritis) of finger     hands    PONV (postoperative nausea and vomiting)     with anesthesia     Past Surgical History:        Procedure Laterality Date    CHOLECYSTECTOMY  10/2007    COLONOSCOPY  08/09/2011    DODIG    FRACTURE SURGERY  aug 2012    open reduction internal fixation left wrist    HAND SURGERY Left 3/26/2021    LEFT WRIST HARDWARE REMOVAL performed by Mckenna Gallegos MD at [address] [place] [address] Left 3/26/2021    FLEXOR TENOLYSIS LEFT THUMB FLEXOR TENDON RECONSTRUCTION WITH AUTOGRAFT  TENDON TRANSFER performed by Mckenna Gallegos MD at [facility] ARTHROSCOPY Right 1/22/2021    RIGHT SHOULDER ARTHROSCOPY, SUBACROMIAL DECOMPRESSION, ROTATOR CUFF REPAIR AND DEBRIDEMENT ( [facility]) performed by Veronica Baires DO at [address]  08/09/2011     Current Medications:   No current facility-administered medications for this visit. Allergies:  Vicodin [hydrocodone-acetaminophen]    Social History:   TOBACCO:   reports that she has never smoked. She has never used smokeless tobacco.  ETOH:   reports current alcohol use. DRUGS:   reports no history of drug use.   ACTIVITIES OF DAILY LIVING: OCCUPATION:    Family History:       Problem Relation Age of Onset    Stroke Mother     Colon Cancer Father     Other Father     Cancer Sister         Breast    Other Sister         Murdered       REVIEW OF SYSTEMS:  CONSTITUTIONAL:  negative  EYES:  negative  HEENT:  negative  RESPIRATORY:  negative  CARDIOVASCULAR:  negative  GASTROINTESTINAL:  negative  GENITOURINARY:  negative  INTEGUMENT/BREAST:  negative  HEMATOLOGIC/LYMPHATIC:  negative  ALLERGIC/IMMUNOLOGIC:  negative  ENDOCRINE:  negative  MUSCULOSKELETAL: Left thumb pain and weakness  NEUROLOGICAL:  negative  BEHAVIOR/PSYCH:  negative    PHYSICAL EXAM:    VITALS:  Resp 20   Ht 5' (1.524 m)   Wt 110 lb (49.9 kg)   LMP 08/13/2012   BMI 21.48 kg/m²   CONSTITUTIONAL:  awake, alert, cooperative, no apparent distress, and appears stated age  EYES:  Lids and lashes normal, pupils equal, round and reactive to light, extra ocular muscles intact, sclera clear, conjunctiva normal  ENT:  Normocephalic, without obvious abnormality, atraumatic, sinuses nontender on palpation, external ears without lesions, oral pharynx with moist mucus membranes, tonsils without erythema or exudates, gums normal and good dentition. NECK:  Supple, symmetrical, trachea midline, no adenopathy, thyroid symmetric, not enlarged and no tenderness, skin normal  LUNGS:  CTA  CARDIOVASCULAR:  RRR  ABDOMEN:  Soft,nttp  CHEST/BREASTS:  deferred  GENITAL/URINARY:  deferred  NEUROLOGIC:  Awake, alert, oriented to name, place and time. Cranial nerves II-XII are grossly intact. Motor is 5 out of 5 bilaterally. Sensory is intact. gait is normal.  MUSCULOSKELETAL:    Left upper extremity: Nontender about shoulder and elbow. Well-healed scar over the volar aspect of the wrist and thumb. There is a 60 degree hyperextension deformity of the thumb MP joint.   There is a collapse deformity of the thumb metacarpal.  There is a positive CMC grind test.  There is intact thumb IP joint flexion extension with good pull-through of the reconstructed flexor tendon. Sensation intact to the thumb index middle ring and small fingers to light touch. Brisk cap refill of digits. Otherwise neurovascular intact. DATA:    CBC:   Lab Results   Component Value Date    WBC 4.9 04/13/2021    RBC 3.56 04/13/2021    HGB 10.5 04/13/2021    HCT 32.9 04/13/2021    MCV 92.4 04/13/2021    MCH 29.5 04/13/2021    MCHC 31.9 04/13/2021    RDW 11.9 04/13/2021     04/13/2021    MPV 10.3 04/13/2021     PT/INR:  No results found for: PROTIME, INR    Radiology Review: X-rays of the left thumb dated 5/25/2021 reviewed. 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 A hyperextension deformity of the thumb MCP joint with early arthritic changes present. 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 She does have a Z deformity and weakness with pinching  secondary to the thumb arthritis and MCP joint hyperextension. Treatment option explained and discussed. Patient like to proceed with a trapeziectomy and ligament reconstruction, MCP joint fusion, and possible application of a Arthrex mini tight rope. Postoperative course and healing were explained. I explained the risks, benefits, alternatives and complications of surgery with the patient including but not limited to the risks of infection, possible damage to nerves, vessels, or tendons, stiffness, loss of range of motion, scar sensitivity, wound healing complications, worsening symptoms, possible need for therapy, as well as the possible need further surgery and unanticipated complications. The patient voiced understanding and all questions were answered.  The patient elected to proceed with surgical intervention.      Yasir Kirk MD  7/27/2021

## 2021-07-28 DIAGNOSIS — M18.12 ARTHRITIS OF CARPOMETACARPAL (CMC) JOINT OF LEFT THUMB: Primary | ICD-10-CM

## 2021-10-19 ENCOUNTER — PREP FOR PROCEDURE (OUTPATIENT)
Dept: ORTHOPEDIC SURGERY | Age: 56
End: 2021-10-19

## 2021-10-19 RX ORDER — SODIUM CHLORIDE 0.9 % (FLUSH) 0.9 %
5-40 SYRINGE (ML) INJECTION PRN
Status: CANCELLED | OUTPATIENT
Start: 2021-10-19

## 2021-10-19 RX ORDER — SODIUM CHLORIDE 9 MG/ML
25 INJECTION, SOLUTION INTRAVENOUS PRN
Status: CANCELLED | OUTPATIENT
Start: 2021-10-19

## 2021-10-19 RX ORDER — SODIUM CHLORIDE 9 MG/ML
INJECTION, SOLUTION INTRAVENOUS CONTINUOUS
Status: CANCELLED | OUTPATIENT
Start: 2021-10-19

## 2021-10-19 RX ORDER — SODIUM CHLORIDE 0.9 % (FLUSH) 0.9 %
5-40 SYRINGE (ML) INJECTION EVERY 12 HOURS SCHEDULED
Status: CANCELLED | OUTPATIENT
Start: 2021-10-19

## 2021-11-08 ENCOUNTER — HOSPITAL ENCOUNTER (OUTPATIENT)
Age: 56
Discharge: HOME | End: 2021-11-10
Payer: COMMERCIAL

## 2021-11-08 DIAGNOSIS — Z01.818 PREOP TESTING: ICD-10-CM

## 2021-11-08 PROCEDURE — U0003 INFECTIOUS AGENT DETECTION BY NUCLEIC ACID (DNA OR RNA); SEVERE ACUTE RESPIRATORY SYNDROME CORONAVIRUS 2 (SARS-COV-2) (CORONAVIRUS DISEASE [COVID-19]), AMPLIFIED PROBE TECHNIQUE, MAKING USE OF HIGH THROUGHPUT TECHNOLOGIES AS DESCRIBED BY CMS-2020-01-R: HCPCS

## 2021-11-08 PROCEDURE — U0005 INFEC AGEN DETEC AMPLI PROBE: HCPCS

## 2021-11-09 LAB — SARS-COV-2, PCR: NOT DETECTED

## 2021-11-10 NOTE — PROGRESS NOTES
[facility] PRE-ADMISSION TESTING INSTRUCTIONS    The Preadmission Testing patient is instructed accordingly using the following criteria (check applicable):    ARRIVAL INSTRUCTIONS:  [x] Parking the day of Surgery is located in the Main Entrance lot. Upon entering the door, make an immediate right to the surgery reception desk    [x] Bring photo ID and insurance card    [] Bring in a copy of Living will or Durable Power of  papers. [x] Please be sure to arrange for responsible adult to provide transportation to and from the hospital    [x] Please arrange for responsible adult to be with you for the 24 hour period post procedure due to having anesthesia      GENERAL INSTRUCTIONS:    [x] Nothing by mouth after midnight, including gum, candy, mints or water    [x] You may brush your teeth, but do not swallow any water    [] Take medications as instructed with 1-2 oz of water    [x] Stop herbal supplements and vitamins 5 days prior to procedure    [x] Follow preop dosing of blood thinners per physician instructions    [] Take 1/2 dose of evening insulin, but no insulin after midnight    [] No oral diabetic medications after midnight    [] If diabetic and have low blood sugar or feel symptomatic, take 1-2oz apple juice only    [] Bring inhalers day of surgery    [] Bring C-PAP/ Bi-Pap day of surgery    [] Bring urine specimen day of surgery    [x] Shower or bath with soap, lather and rinse well, AM of Surgery, no lotion, powders or creams to surgical site    [] Follow bowel prep as instructed per surgeon    [x] No tobacco products within 24 hours of surgery     [x] No alcohol or illegal drug use within 24 hours of surgery.     [x] Jewelry, body piercing's, eyeglasses, contact lenses and dentures are not permitted into surgery (bring cases)      [x] Please do not wear any nail polish, make up or hair products on the day of surgery    [x] You can expect a call the business day prior to please call [phone] for instruction.

## 2021-11-11 ENCOUNTER — ANESTHESIA EVENT (OUTPATIENT)
Dept: OPERATING ROOM | Age: 56
End: 2021-11-11
Payer: COMMERCIAL

## 2021-11-12 ENCOUNTER — APPOINTMENT (OUTPATIENT)
Dept: GENERAL RADIOLOGY | Age: 56
End: 2021-11-12
Attending: ORTHOPAEDIC SURGERY
Payer: COMMERCIAL

## 2021-11-12 ENCOUNTER — HOSPITAL ENCOUNTER (OUTPATIENT)
Age: 56
Setting detail: OUTPATIENT SURGERY
Discharge: HOME | End: 2021-11-12
Attending: ORTHOPAEDIC SURGERY | Admitting: ORTHOPAEDIC SURGERY
Payer: COMMERCIAL

## 2021-11-12 ENCOUNTER — ANESTHESIA (OUTPATIENT)
Dept: OPERATING ROOM | Age: 56
End: 2021-11-12
Payer: COMMERCIAL

## 2021-11-12 VITALS
BODY MASS INDEX: 21.6 KG/M2 | WEIGHT: 110 LBS | HEART RATE: 68 BPM | RESPIRATION RATE: 18 BRPM | TEMPERATURE: 97.6 F | DIASTOLIC BLOOD PRESSURE: 72 MMHG | SYSTOLIC BLOOD PRESSURE: 136 MMHG | HEIGHT: 60 IN | OXYGEN SATURATION: 97 %

## 2021-11-12 VITALS — OXYGEN SATURATION: 99 % | DIASTOLIC BLOOD PRESSURE: 80 MMHG | SYSTOLIC BLOOD PRESSURE: 151 MMHG | TEMPERATURE: 94.5 F

## 2021-11-12 DIAGNOSIS — Z01.818 PREOP TESTING: Primary | ICD-10-CM

## 2021-11-12 DIAGNOSIS — M18.12 ARTHRITIS OF CARPOMETACARPAL (CMC) JOINT OF LEFT THUMB: ICD-10-CM

## 2021-11-12 PROCEDURE — 7100000011 HC PHASE II RECOVERY - ADDTL 15 MIN: Performed by: ORTHOPAEDIC SURGERY

## 2021-11-12 PROCEDURE — 25310 TRANSPLANT FOREARM TENDON: CPT | Performed by: ORTHOPAEDIC SURGERY

## 2021-11-12 PROCEDURE — C1713 ANCHOR/SCREW BN/BN,TIS/BN: HCPCS | Performed by: ORTHOPAEDIC SURGERY

## 2021-11-12 PROCEDURE — 6370000000 HC RX 637 (ALT 250 FOR IP): Performed by: ORTHOPAEDIC SURGERY

## 2021-11-12 PROCEDURE — 3700000000 HC ANESTHESIA ATTENDED CARE: Performed by: ORTHOPAEDIC SURGERY

## 2021-11-12 PROCEDURE — 3600000003 HC SURGERY LEVEL 3 BASE: Performed by: ORTHOPAEDIC SURGERY

## 2021-11-12 PROCEDURE — 7100000000 HC PACU RECOVERY - FIRST 15 MIN: Performed by: ORTHOPAEDIC SURGERY

## 2021-11-12 PROCEDURE — 2500000003 HC RX 250 WO HCPCS: Performed by: ORTHOPAEDIC SURGERY

## 2021-11-12 PROCEDURE — 2709999900 HC NON-CHARGEABLE SUPPLY: Performed by: ORTHOPAEDIC SURGERY

## 2021-11-12 PROCEDURE — 2720000010 HC SURG SUPPLY STERILE: Performed by: ORTHOPAEDIC SURGERY

## 2021-11-12 PROCEDURE — 2500000003 HC RX 250 WO HCPCS: Performed by: NURSE ANESTHETIST, CERTIFIED REGISTERED

## 2021-11-12 PROCEDURE — 3209999900 FLUORO FOR SURGICAL PROCEDURES

## 2021-11-12 PROCEDURE — 26516 FUSION OF KNUCKLE JOINT: CPT | Performed by: ORTHOPAEDIC SURGERY

## 2021-11-12 PROCEDURE — 3600000013 HC SURGERY LEVEL 3 ADDTL 15MIN: Performed by: ORTHOPAEDIC SURGERY

## 2021-11-12 PROCEDURE — 6370000000 HC RX 637 (ALT 250 FOR IP): Performed by: ANESTHESIOLOGY

## 2021-11-12 PROCEDURE — 6360000002 HC RX W HCPCS: Performed by: ORTHOPAEDIC SURGERY

## 2021-11-12 PROCEDURE — 6360000002 HC RX W HCPCS: Performed by: NURSE ANESTHETIST, CERTIFIED REGISTERED

## 2021-11-12 PROCEDURE — 7100000010 HC PHASE II RECOVERY - FIRST 15 MIN: Performed by: ORTHOPAEDIC SURGERY

## 2021-11-12 PROCEDURE — 6360000002 HC RX W HCPCS: Performed by: ANESTHESIOLOGY

## 2021-11-12 PROCEDURE — 6360000002 HC RX W HCPCS: Performed by: PHYSICIAN ASSISTANT

## 2021-11-12 PROCEDURE — 25447 ARTHRP NTRCRP/CRP/MTCR NTRPS: CPT | Performed by: ORTHOPAEDIC SURGERY

## 2021-11-12 PROCEDURE — 3700000001 HC ADD 15 MINUTES (ANESTHESIA): Performed by: ORTHOPAEDIC SURGERY

## 2021-11-12 PROCEDURE — 2580000003 HC RX 258: Performed by: NURSE ANESTHETIST, CERTIFIED REGISTERED

## 2021-11-12 PROCEDURE — 7100000001 HC PACU RECOVERY - ADDTL 15 MIN: Performed by: ORTHOPAEDIC SURGERY

## 2021-11-12 DEVICE — KIT IMPL DIA1.1MM CMC S STL REP MINI TIGHTROPE: Type: IMPLANTABLE DEVICE | Site: THUMB | Status: FUNCTIONAL

## 2021-11-12 RX ORDER — ONDANSETRON 2 MG/ML
INJECTION INTRAMUSCULAR; INTRAVENOUS PRN
Status: DISCONTINUED | OUTPATIENT
Start: 2021-11-12 | End: 2021-11-12 | Stop reason: SDUPTHER

## 2021-11-12 RX ORDER — LIDOCAINE HYDROCHLORIDE 20 MG/ML
INJECTION, SOLUTION EPIDURAL; INFILTRATION; INTRACAUDAL; PERINEURAL PRN
Status: DISCONTINUED | OUTPATIENT
Start: 2021-11-12 | End: 2021-11-12 | Stop reason: SDUPTHER

## 2021-11-12 RX ORDER — OXYCODONE HYDROCHLORIDE AND ACETAMINOPHEN 5; 325 MG/1; MG/1
1 TABLET ORAL EVERY 6 HOURS PRN
Qty: 28 TABLET | Refills: 0 | Status: SHIPPED | OUTPATIENT
Start: 2021-11-12 | End: 2021-11-19

## 2021-11-12 RX ORDER — DEXAMETHASONE SODIUM PHOSPHATE 4 MG/ML
INJECTION, SOLUTION INTRA-ARTICULAR; INTRALESIONAL; INTRAMUSCULAR; INTRAVENOUS; SOFT TISSUE PRN
Status: DISCONTINUED | OUTPATIENT
Start: 2021-11-12 | End: 2021-11-12 | Stop reason: SDUPTHER

## 2021-11-12 RX ORDER — SODIUM CHLORIDE 0.9 % (FLUSH) 0.9 %
5-40 SYRINGE (ML) INJECTION PRN
Status: DISCONTINUED | OUTPATIENT
Start: 2021-11-12 | End: 2021-11-12 | Stop reason: HOSPADM

## 2021-11-12 RX ORDER — SODIUM CHLORIDE 9 MG/ML
INJECTION, SOLUTION INTRAVENOUS CONTINUOUS
Status: DISCONTINUED | OUTPATIENT
Start: 2021-11-12 | End: 2021-11-12 | Stop reason: HOSPADM

## 2021-11-12 RX ORDER — MIDAZOLAM HYDROCHLORIDE 1 MG/ML
INJECTION INTRAMUSCULAR; INTRAVENOUS PRN
Status: DISCONTINUED | OUTPATIENT
Start: 2021-11-12 | End: 2021-11-12

## 2021-11-12 RX ORDER — ONDANSETRON 2 MG/ML
4 INJECTION INTRAMUSCULAR; INTRAVENOUS
Status: DISCONTINUED | OUTPATIENT
Start: 2021-11-12 | End: 2021-11-12 | Stop reason: HOSPADM

## 2021-11-12 RX ORDER — MEPERIDINE HYDROCHLORIDE 25 MG/ML
12.5 INJECTION INTRAMUSCULAR; INTRAVENOUS; SUBCUTANEOUS EVERY 5 MIN PRN
Status: DISCONTINUED | OUTPATIENT
Start: 2021-11-12 | End: 2021-11-12 | Stop reason: HOSPADM

## 2021-11-12 RX ORDER — SCOLOPAMINE TRANSDERMAL SYSTEM 1 MG/1
1 PATCH, EXTENDED RELEASE TRANSDERMAL ONCE
Status: DISCONTINUED | OUTPATIENT
Start: 2021-11-12 | End: 2021-11-12 | Stop reason: HOSPADM

## 2021-11-12 RX ORDER — MIDAZOLAM HYDROCHLORIDE 1 MG/ML
INJECTION INTRAMUSCULAR; INTRAVENOUS PRN
Status: DISCONTINUED | OUTPATIENT
Start: 2021-11-12 | End: 2021-11-12 | Stop reason: SDUPTHER

## 2021-11-12 RX ORDER — FENTANYL CITRATE 50 UG/ML
INJECTION, SOLUTION INTRAMUSCULAR; INTRAVENOUS PRN
Status: DISCONTINUED | OUTPATIENT
Start: 2021-11-12 | End: 2021-11-12 | Stop reason: SDUPTHER

## 2021-11-12 RX ORDER — MORPHINE SULFATE 2 MG/ML
1 INJECTION, SOLUTION INTRAMUSCULAR; INTRAVENOUS EVERY 5 MIN PRN
Status: DISCONTINUED | OUTPATIENT
Start: 2021-11-12 | End: 2021-11-12 | Stop reason: HOSPADM

## 2021-11-12 RX ORDER — OXYCODONE HYDROCHLORIDE AND ACETAMINOPHEN 5; 325 MG/1; MG/1
1 TABLET ORAL ONCE
Status: COMPLETED | OUTPATIENT
Start: 2021-11-12 | End: 2021-11-12

## 2021-11-12 RX ORDER — SODIUM CHLORIDE 9 MG/ML
25 INJECTION, SOLUTION INTRAVENOUS PRN
Status: DISCONTINUED | OUTPATIENT
Start: 2021-11-12 | End: 2021-11-12 | Stop reason: HOSPADM

## 2021-11-12 RX ORDER — SODIUM CHLORIDE 9 MG/ML
INJECTION, SOLUTION INTRAVENOUS CONTINUOUS PRN
Status: DISCONTINUED | OUTPATIENT
Start: 2021-11-12 | End: 2021-11-12 | Stop reason: SDUPTHER

## 2021-11-12 RX ORDER — PROPOFOL 10 MG/ML
INJECTION, EMULSION INTRAVENOUS PRN
Status: DISCONTINUED | OUTPATIENT
Start: 2021-11-12 | End: 2021-11-12 | Stop reason: SDUPTHER

## 2021-11-12 RX ORDER — SODIUM CHLORIDE 0.9 % (FLUSH) 0.9 %
5-40 SYRINGE (ML) INJECTION EVERY 12 HOURS SCHEDULED
Status: DISCONTINUED | OUTPATIENT
Start: 2021-11-12 | End: 2021-11-12 | Stop reason: HOSPADM

## 2021-11-12 RX ADMIN — PROPOFOL 200 MG: 10 INJECTION, EMULSION INTRAVENOUS at 07:20

## 2021-11-12 RX ADMIN — LIDOCAINE HYDROCHLORIDE 90 MG: 20 INJECTION, SOLUTION EPIDURAL; INFILTRATION; INTRACAUDAL; PERINEURAL at 07:20

## 2021-11-12 RX ADMIN — FENTANYL CITRATE 50 MCG: 50 INJECTION, SOLUTION INTRAMUSCULAR; INTRAVENOUS at 07:37

## 2021-11-12 RX ADMIN — HYDROMORPHONE HYDROCHLORIDE 0.5 MG: 1 INJECTION, SOLUTION INTRAMUSCULAR; INTRAVENOUS; SUBCUTANEOUS at 10:17

## 2021-11-12 RX ADMIN — MIDAZOLAM 1 MG: 1 INJECTION INTRAMUSCULAR; INTRAVENOUS at 07:14

## 2021-11-12 RX ADMIN — HYDROMORPHONE HYDROCHLORIDE 0.5 MG: 1 INJECTION, SOLUTION INTRAMUSCULAR; INTRAVENOUS; SUBCUTANEOUS at 10:12

## 2021-11-12 RX ADMIN — DEXAMETHASONE SODIUM PHOSPHATE 10 MG: 4 INJECTION, SOLUTION INTRAMUSCULAR; INTRAVENOUS at 07:25

## 2021-11-12 RX ADMIN — FENTANYL CITRATE 50 MCG: 50 INJECTION, SOLUTION INTRAMUSCULAR; INTRAVENOUS at 07:20

## 2021-11-12 RX ADMIN — SODIUM CHLORIDE: 9 INJECTION, SOLUTION INTRAVENOUS at 08:24

## 2021-11-12 RX ADMIN — OXYCODONE AND ACETAMINOPHEN 1 TABLET: 5; 325 TABLET ORAL at 11:39

## 2021-11-12 RX ADMIN — SODIUM CHLORIDE: 9 INJECTION, SOLUTION INTRAVENOUS at 07:16

## 2021-11-12 RX ADMIN — MIDAZOLAM 1 MG: 1 INJECTION INTRAMUSCULAR; INTRAVENOUS at 07:19

## 2021-11-12 RX ADMIN — PHENYLEPHRINE HYDROCHLORIDE 50 MCG: 10 INJECTION INTRAVENOUS at 08:55

## 2021-11-12 RX ADMIN — ONDANSETRON 4 MG: 2 INJECTION INTRAMUSCULAR; INTRAVENOUS at 07:25

## 2021-11-12 RX ADMIN — Medication 2000 MG: at 07:28

## 2021-11-12 ASSESSMENT — PULMONARY FUNCTION TESTS
PIF_VALUE: 2
PIF_VALUE: 14
PIF_VALUE: 15
PIF_VALUE: 14
PIF_VALUE: 15
PIF_VALUE: 15
PIF_VALUE: 14
PIF_VALUE: 14
PIF_VALUE: 19
PIF_VALUE: 13
PIF_VALUE: 15
PIF_VALUE: 15
PIF_VALUE: 14
PIF_VALUE: 5
PIF_VALUE: 15
PIF_VALUE: 14
PIF_VALUE: 15
PIF_VALUE: 14
PIF_VALUE: 14
PIF_VALUE: 1
PIF_VALUE: 14
PIF_VALUE: 2
PIF_VALUE: 15
PIF_VALUE: 14
PIF_VALUE: 1
PIF_VALUE: 15
PIF_VALUE: 15
PIF_VALUE: 5
PIF_VALUE: 23
PIF_VALUE: 14
PIF_VALUE: 12
PIF_VALUE: 15
PIF_VALUE: 2
PIF_VALUE: 15
PIF_VALUE: 1
PIF_VALUE: 2
PIF_VALUE: 16
PIF_VALUE: 12
PIF_VALUE: 15
PIF_VALUE: 16
PIF_VALUE: 15
PIF_VALUE: 2
PIF_VALUE: 15
PIF_VALUE: 14
PIF_VALUE: 15
PIF_VALUE: 0
PIF_VALUE: 15
PIF_VALUE: 15
PIF_VALUE: 5
PIF_VALUE: 2
PIF_VALUE: 15
PIF_VALUE: 14
PIF_VALUE: 15
PIF_VALUE: 14
PIF_VALUE: 15
PIF_VALUE: 15
PIF_VALUE: 2
PIF_VALUE: 14
PIF_VALUE: 15
PIF_VALUE: 13
PIF_VALUE: 3
PIF_VALUE: 15
PIF_VALUE: 15
PIF_VALUE: 3
PIF_VALUE: 14
PIF_VALUE: 15
PIF_VALUE: 1
PIF_VALUE: 15
PIF_VALUE: 14
PIF_VALUE: 15
PIF_VALUE: 3
PIF_VALUE: 15
PIF_VALUE: 3

## 2021-11-12 ASSESSMENT — PAIN SCALES - GENERAL
PAINLEVEL_OUTOF10: 7
PAINLEVEL_OUTOF10: 0
PAINLEVEL_OUTOF10: 4
PAINLEVEL_OUTOF10: 7
PAINLEVEL_OUTOF10: 6
PAINLEVEL_OUTOF10: 7
PAINLEVEL_OUTOF10: 8
PAINLEVEL_OUTOF10: 8

## 2021-11-12 ASSESSMENT — PAIN DESCRIPTION - PAIN TYPE
TYPE: SURGICAL PAIN

## 2021-11-12 ASSESSMENT — PAIN DESCRIPTION - ORIENTATION: ORIENTATION: LEFT

## 2021-11-12 ASSESSMENT — PAIN DESCRIPTION - LOCATION: LOCATION: FINGER (COMMENT WHICH ONE)

## 2021-11-12 NOTE — ANESTHESIA PRE PROCEDURE
Department of Anesthesiology  Preprocedure Note       Name:  Shazia Shen   Age:  60 y.o.  :  1965                                          MRN:  [id number]         Date:  2021      Surgeon: Gwen Kirk):  Erasto Mena MD    Procedure: Procedure(s):  LEFT THUMB TRAPEZIECTOMY WITH LIGAMENT RECONSTRUCTION AND TENDON INTERPOSITION WITH POSSIBLE MINI TIGHT ROPE. LEFT THUMB MCP FUSION  +++SYNTHES+++  +++ARTHREX+++    Medications prior to admission:   Prior to Admission medications    Medication Sig Start Date End Date Taking? Authorizing Provider   doxepin (SINEQUAN) 10 MG capsule TAKE 1 CAPSULE BY MOUTH  TWICE DAILY 21   Tundelizeth Duncan DO   valACYclovir (VALTREX) 500 MG tablet Take 1 tablet by mouth 2 times daily 21   Tunde Duncan DO   ibuprofen (ADVIL;MOTRIN) 800 MG tablet Take 1 tablet by mouth every 6 hours as needed for Pain 20   Tamie Schneider, APRN - CNP   Multiple Vitamins-Minerals (HAIR SKIN AND NAILS FORMULA PO) Take 1 tablet by mouth daily Last dose 21    Historical Provider, MD       Current medications:    No current facility-administered medications for this visit. No current outpatient medications on file. Facility-Administered Medications Ordered in Other Visits   Medication Dose Route Frequency Provider Last Rate Last Admin    scopolamine (TRANSDERM-SCOP) transdermal patch 1 patch  1 patch TransDERmal Once Bebeto lAlen MD        morphine (PF) injection 1 mg  1 mg IntraVENous Q5 Min PRN Bebeto Allen MD        HYDROmorphone (DILAUDID) injection 0.5 mg  0.5 mg IntraVENous Q5 Min PRN Bebeto Allen MD        HYDROmorphone (DILAUDID) injection 0.25 mg  0.25 mg IntraVENous Q5 Min PRN Bebeto Allen MD        [facility] injection 4 mg  4 mg IntraVENous Once PRN Bebeto Allen MD        meperidine (DEMEROL) injection 12.5 mg  12.5 mg IntraVENous Q5 Min PRN Bebeto Allen MD           Allergies:     Allergies   Allergen Reactions    Vicodin [Hydrocodone-Acetaminophen] Nausea And Vomiting       Problem List:    Patient Active Problem List   Diagnosis Code    Wrist pain M25.539    Distal radius fracture S52.509A    Degenerative arthritis of thumb M18.10    Arthritis of right hand M19.041    Arthritis of left hand M19.042    Traumatic complete tear of right rotator cuff S46.011A    Impingement syndrome of right shoulder M75.41    Tear of right glenoid labrum S43.431A    Biceps tendonitis on right M75.21    Rupture of extensor tendons of left hand and wrist S66.812A    Rupture of flexor tendon of hand S66.819A       Past Medical History:        Diagnosis Date    CMC arthritis     left thumb    Depression     Hyperlipidemia     slight not on meds    Insomnia     OA (osteoarthritis) of finger     hands    PONV (postoperative nausea and vomiting)     with anesthesia       Past Surgical History:        Procedure Laterality Date    CHOLECYSTECTOMY  10/2007    COLONOSCOPY  08/09/2011    DODIG    FRACTURE SURGERY  aug 2012    open reduction internal fixation left wrist    HAND SURGERY Left 3/26/2021    LEFT WRIST HARDWARE REMOVAL performed by Shasha Chaudhary MD at [facility] HAND TENDON SURGERY Left 3/26/2021    FLEXOR TENOLYSIS LEFT THUMB FLEXOR TENDON RECONSTRUCTION WITH AUTOGRAFT  TENDON TRANSFER performed by Shasha Chaudhary MD at [address] [place] [address] ARTHROSCOPY Right 1/22/2021    RIGHT SHOULDER ARTHROSCOPY, SUBACROMIAL DECOMPRESSION, ROTATOR CUFF REPAIR AND DEBRIDEMENT ([address]) performed by Rigo Maria DO at [address] [facility] [address]  08/09/2011       Social History:    Social History     Tobacco Use    Smoking status: Never Smoker    Smokeless tobacco: Never Used   Substance Use Topics    Alcohol use: Yes     Comment: rarely                                Counseling given: Not Answered      Vital Signs (Current): There were no vitals filed for this visit. BP Readings from Last 3 Encounters:   07/28/21 120/78   04/27/21 128/70   03/26/21 118/70       NPO Status:                                                                                 BMI:   Wt Readings from Last 3 Encounters:   11/10/21 110 lb (49.9 kg)   07/28/21 114 lb (51.7 kg)   07/27/21 110 lb (49.9 kg)     There is no height or weight on file to calculate BMI.    CBC:   Lab Results   Component Value Date    WBC 4.5 08/26/2021    RBC 3.73 08/26/2021    HGB 10.9 08/26/2021    HCT 33.8 08/26/2021    MCV 90.6 08/26/2021    RDW 12.2 08/26/2021     08/26/2021       CMP:   Lab Results   Component Value Date     08/26/2021    K 4.6 08/26/2021     08/26/2021    CO2 25 08/26/2021    BUN 21 08/26/2021    CREATININE 1.2 08/26/2021    GFRAA 56 08/26/2021    LABGLOM 46 08/26/2021    GLUCOSE 89 08/26/2021    PROT 7.2 08/26/2021    CALCIUM 9.7 08/26/2021    BILITOT 0.3 08/26/2021    ALKPHOS 82 08/26/2021    AST 31 08/26/2021    ALT 24 08/26/2021       POC Tests: No results for input(s): POCGLU, POCNA, POCK, POCCL, POCBUN, POCHEMO, POCHCT in the last 72 hours. Coags: No results found for: PROTIME, INR, APTT    HCG (If Applicable):   Lab Results   Component Value Date    PREGTESTUR NEGATIVE 08/08/2011    PREGSERUM NEGATIVE 08/22/2012        ABGs: No results found for: PHART, PO2ART, JHN9LOB, FTX6RVJ, BEART, [id number]     Type & Screen (If Applicable):  No results found for: LABABO, LABRH    Drug/Infectious Status (If Applicable):  No results found for: HIV, HEPCAB    COVID-19 Screening (If Applicable):   Lab Results   Component Value Date    COVID19 Not Detected 11/08/2021         Anesthesia Evaluation  Patient summary reviewed   history of anesthetic complications: PONV.   Airway: Mallampati: II  TM distance: >3 FB   Neck ROM: full  Mouth opening: > = 3 FB Dental: normal exam         Pulmonary:Negative Pulmonary ROS breath sounds clear to auscultation Cardiovascular:Negative CV ROS    (+) hyperlipidemia    (-) past MI and CAD    ECG reviewed  Rhythm: regular  Rate: normal                    Neuro/Psych:   (+) psychiatric history: stable with treatmentdepression/anxiety             GI/Hepatic/Renal: Neg GI/Hepatic/Renal ROS            Endo/Other: Negative Endo/Other ROS   (+) : arthritis:., .                 Abdominal:       Abdomen: soft. Vascular: Other Findings:               Anesthesia Plan      general     ASA 2       Induction: intravenous. MIPS: Postoperative opioids intended and Prophylactic antiemetics administered. Anesthetic plan and risks discussed with patient and spouse. Plan discussed with CRNA.                 Adore Magana MD   11/12/2021

## 2021-11-12 NOTE — ANESTHESIA POSTPROCEDURE EVALUATION
Department of Anesthesiology  Postprocedure Note    Patient: Vaishnavi Hammer  MRN: [id number]  YOB: 1965  Date of evaluation: 11/12/2021  Time:  1:06 PM     Procedure Summary     Date: 11/12/21 Room / Location: [facility] [address] / [address] [place] [address]    Anesthesia Start: 4559 Anesthesia Stop: 9092    Procedure: LEFT THUMB TRAPEZIECTOMY WITH LIGAMENT RECONSTRUCTION AND TENDON INTERPOSITION WITH  MINI TIGHT ROPE. LEFT (Left Hand) Diagnosis: (LEFT THUMB CARPOMETACARPAL ARTHRITIS)    Surgeons: Margaret Garcia MD Responsible Provider: Matt Perez MD    Anesthesia Type: general ASA Status: 2          Anesthesia Type: general    Paul Phase I: Paul Score: 10    Paul Phase II: Paul Score: 10    Last vitals: Reviewed and per EMR flowsheets.        Anesthesia Post Evaluation    Patient location during evaluation: PACU  Patient participation: complete - patient participated  Level of consciousness: awake and alert  Airway patency: patent  Nausea & Vomiting: no vomiting and no nausea  Complications: no  Cardiovascular status: hemodynamically stable  Respiratory status: acceptable  Hydration status: stable  Multimodal analgesia pain management approach

## 2021-11-12 NOTE — BRIEF OP NOTE
Brief Postoperative Note      Patient: Francisco Gomes  YOB: 1965  MRN: [id number]    Date of Procedure: 11/12/2021    Pre-Op Diagnosis: LEFT THUMB CARPOMETACARPAL ARTHRITIS    Post-Op Diagnosis: Same       Procedure(s):  LEFT THUMB TRAPEZIECTOMY WITH LIGAMENT RECONSTRUCTION AND TENDON INTERPOSITION WITH  MINI TIGHT ROPE. LEFT    Surgeon(s):  Jama Lynn MD    Assistant:  Resident: Popeye Fabian DO    Anesthesia: General    Estimated Blood Loss (mL): less than 50     Complications: None    Specimens:   * No specimens in log *    Implants:  Implant Name Type Inv. Item Serial No.  Lot No. LRB No. Used Action   KIT IMPL DIA1. 1MM Aia 16 S STL REP MINI TIGHTROPE  KIT IMPL DIA1. 1MM Aia 16 S STL REP MINI [employer] [id number] Left 1 Implanted   ANCHOR SUTURE JESSICA 2.5X7 MM W/ FORK EYELET [facility] SUTURE JESSICA 2.5X7 MM W/ FORK EYELET [employer] [id number] Left 1 Implanted   ANCHOR SUTURE JESSICA 2.5X7 MM W/ FORK EYELET [facility] SUTURE JESSICA 2.5X7 MM W/ FORK EYELET [employer]WD [id number] Left 1 Implanted         Drains: * No LDAs found *    Findings: see op note    Electronically signed by Popeye Fabian DO on 11/12/2021 at 9:16 AM

## 2021-11-12 NOTE — PROGRESS NOTES
CLINICAL PHARMACY NOTE: MEDS TO BEDS    Total # of Prescriptions Filled: 1   The following medications were delivered to the patient:  · Percocet 5/325    Additional Documentation:

## 2021-11-12 NOTE — H&P
Please refer to H&P below. I have examined the patient today and there has been no interval changes in history and physical examination. The patient was counseled at length about the risks of patricia Covid-19 during their perioperative period and any recovery window from their procedure. The patient was made aware that patricia Covid-19  may worsen their prognosis for recovering from their procedure  and lend to a higher morbidity and/or mortality risk. All material risks, benefits, and reasonable alternatives including postponing the procedure were discussed. The patient does wish to proceed with the procedure at this time. Department of Orthopedic Surgery  [facility] Sacha Wallis MD  History and Physical      CHIEF COMPLAINT: Left thumb pain and weakness    HISTORY OF PRESENT ILLNESS:                The patient is a 60 y.o. female who presents with left thumb pain and weakness. She is 4 months postop FPL tendon reconstruction with palmaris longus graft. She is doing quite well with the tendon reconstruction and has full thumb IP joint motion. Her biggest complaint is pain at the base of the thumb [facility] joint as well as hyperextension deformity of the MCP joint. This limits her  strength and pinch strength. She would like to proceed with surgical intervention. .    Past Medical History:        Diagnosis Date    CMC arthritis     left thumb    Depression     Hyperlipidemia     slight not on meds    Insomnia     OA (osteoarthritis) of finger     hands    PONV (postoperative nausea and vomiting)     with anesthesia     Past Surgical History:        Procedure Laterality Date    CHOLECYSTECTOMY  10/2007    COLONOSCOPY  08/09/2011    DODIG    FRACTURE SURGERY  aug 2012    open reduction internal fixation left wrist    HAND SURGERY Left 3/26/2021    LEFT WRIST HARDWARE REMOVAL performed by Beverly Lucas MD at [address] [facility] Left 3/26/2021    FLEXOR TENOLYSIS LEFT THUMB FLEXOR TENDON RECONSTRUCTION WITH AUTOGRAFT  TENDON TRANSFER performed by Dona Narvaez MD at [address] ARTHROSCOPY Right 1/22/2021    RIGHT SHOULDER ARTHROSCOPY, SUBACROMIAL DECOMPRESSION, ROTATOR CUFF REPAIR AND DEBRIDEMENT ([address]) performed by Lorena Lee DO at [address]  08/09/2011     Current Medications:   Current Facility-Administered Medications: scopolamine (TRANSDERM-SCOP) transdermal patch 1 patch, 1 patch, TransDERmal, Once  morphine (PF) injection 1 mg, 1 mg, IntraVENous, Q5 Min PRN  HYDROmorphone (DILAUDID) injection 0.5 mg, 0.5 mg, IntraVENous, Q5 Min PRN  HYDROmorphone (DILAUDID) injection 0.25 mg, 0.25 mg, IntraVENous, Q5 Min PRN  ondansetron (ZOFRAN) injection 4 mg, 4 mg, IntraVENous, Once PRN  meperidine (DEMEROL) injection 12.5 mg, 12.5 mg, IntraVENous, Q5 Min PRN  Allergies:  Vicodin [hydrocodone-acetaminophen]    Social History:   TOBACCO:   reports that she has never smoked. She has never used smokeless tobacco.  ETOH:   reports current alcohol use. DRUGS:   reports no history of drug use.   ACTIVITIES OF DAILY LIVING:    OCCUPATION:    Family History:       Problem Relation Age of Onset    Stroke Mother     Colon Cancer Father     Other Father     Cancer Sister         Breast    Other Sister         Murdered       REVIEW OF SYSTEMS:  CONSTITUTIONAL:  negative  EYES:  negative  HEENT:  negative  RESPIRATORY:  negative  CARDIOVASCULAR:  negative  GASTROINTESTINAL:  negative  GENITOURINARY:  negative  INTEGUMENT/BREAST:  negative  HEMATOLOGIC/LYMPHATIC:  negative  ALLERGIC/IMMUNOLOGIC:  negative  ENDOCRINE:  negative  MUSCULOSKELETAL: Left thumb pain and weakness  NEUROLOGICAL:  negative  BEHAVIOR/PSYCH:  negative    PHYSICAL EXAM:    VITALS:  Ht 5' (1.524 m)   Wt 110 lb (49.9 kg)   LMP 08/13/2012   BMI 21.48 kg/m²   CONSTITUTIONAL:  awake, alert, cooperative, no apparent distress, and appears stated age  EYES:  Lids and lashes normal, pupils equal, round and reactive to light, extra ocular muscles intact, sclera clear, conjunctiva normal  ENT:  Normocephalic, without obvious abnormality, atraumatic, sinuses nontender on palpation, external ears without lesions, oral pharynx with moist mucus membranes, tonsils without erythema or exudates, gums normal and good dentition. NECK:  Supple, symmetrical, trachea midline, no adenopathy, thyroid symmetric, not enlarged and no tenderness, skin normal  LUNGS:  CTA  CARDIOVASCULAR:  RRR  ABDOMEN:  Soft,nttp  CHEST/BREASTS:  deferred  GENITAL/URINARY:  deferred  NEUROLOGIC:  Awake, alert, oriented to name, place and time. Cranial nerves II-XII are grossly intact. Motor is 5 out of 5 bilaterally. Sensory is intact. gait is normal.  MUSCULOSKELETAL:    Left upper extremity: Nontender about shoulder and elbow. Well-healed scar over the volar aspect of the wrist and thumb. There is a 60 degree hyperextension deformity of the thumb MP joint. There is a collapse deformity of the thumb metacarpal.  There is a positive CMC grind test.  There is intact thumb IP joint flexion extension with good pull-through of the reconstructed flexor tendon. Sensation intact to the thumb index middle ring and small fingers to light touch. Brisk cap refill of digits. Otherwise neurovascular intact. DATA:    CBC:   Lab Results   Component Value Date    WBC 4.5 08/26/2021    RBC 3.73 08/26/2021    HGB 10.9 08/26/2021    HCT 33.8 08/26/2021    MCV 90.6 08/26/2021    MCH 29.2 08/26/2021    MCHC 32.2 08/26/2021    RDW 12.2 08/26/2021     08/26/2021    MPV 10.4 08/26/2021     PT/INR:  No results found for: PROTIME, INR    Radiology Review: X-rays of the left thumb dated 5/25/2021 reviewed. 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 A hyperextension deformity of the thumb MCP joint with early arthritic changes present.     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 She does have a Z deformity and weakness with pinching  secondary to the thumb arthritis and MCP joint hyperextension. Treatment option explained and discussed. Patient like to proceed with a trapeziectomy and ligament reconstruction, MCP joint fusion, and possible application of a Arthrex mini tight rope. Postoperative course and healing were explained. I explained the risks, benefits, alternatives and complications of surgery with the patient including but not limited to the risks of infection, possible damage to nerves, vessels, or tendons, stiffness, loss of range of motion, scar sensitivity, wound healing complications, worsening symptoms, possible need for therapy, as well as the possible need further surgery and unanticipated complications. The patient voiced understanding and all questions were answered. The patient elected to proceed with surgical intervention.      Vikram Narvaez DO  11/12/2021

## 2021-11-12 NOTE — OP NOTE
[address] [address]                                OPERATIVE REPORT    PATIENT NAME: Zhang Emanuel                    :        1965  MED REC NO:   [id number]                            ROOM:  ACCOUNT NO:   [de-identified]                           ADMIT DATE: 2021  PROVIDER:     Kriss Porter MD    DATE OF PROCEDURE:  2021    PREOPERATIVE DIAGNOSES:  1. Left thumb carpometacarpal joint arthritis. 2.  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 POSTOPERATIVE DIAGNOSES:  1. Left thumb carpometacarpal joint arthritis. 2.  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 PROCEDURES PERFORMED:  1. Left thumb trapeziectomy. 2.  Left thumb volar oblique ligament reconstruction using flexor carpi  radialis tendon split. 3.  Application of mini TightRope, left thumb CMC joint. 4.  [place] of flexor carpi radialis tendon for volar oblique ligament  reconstruction. 5.  Dorsal capsulodesis of left thumb CMC joint. 6.  Left metacarpophalangeal joint palmar capsulodesis for MCP joint  instability, reinforced with Arthrex internal brace system. SURGEON:  Kriss Porter M.D. ANESTHESIA:  1. General.  2.  Local anesthetic by surgeon consisting of approximately 20 mL of  0.25% Marcaine with epinephrine. ASSISTANT:  Elham Cruz DO, Orthopedic Surgery Resident. TOTAL TOURNIQUET TIME:  Approximately 71 minutes at 250 mmHg of brachial  tourniquet. ESTIMATED BLOOD LOSS:  Minimal.    COMPLICATIONS:  None. FINDINGS:  1.  End-stage arthrosis with collapse deformity and Z deformity of the  thumb with hyperextension of MCP joint.   2.  Status post trapeziectomy and volar oblique ligament restructure  using split FCR tendon transfer reinforced with Arthrex mini TightRope  system, the thumb suspensionplasty was very stable and the palm could be  placed flat on the operating table. 3.  The scaphotrapezoidal joint and did not have any significant  arthritis. 4.  Fluoroscopy was used to assess the metacarpophalangeal joint. Intraoperative stress maneuvers revealed no significant arthritis and  therefore MCP joint fusion was not undertaken and MCP joint capsulodesis  reinforced with internal brace was performed with the MCP joint in  approximately 15 to 20 degrees of flexion assessed with intraoperative  goniometer. DISPOSITION:  The patient remained stable throughout the procedure. OPERATIVE INDICATIONS:  The patient is a very pleasant 78-year-old  female who previously had been treated with thumb basal joint arthritis  and MCP joint instability. After failing conservative management, she  wished to proceed with surgical intervention. Risks, benefits,  alternatives, and complication of surgery were explained to her  including, but not limited to the risk of infection, damage to nerves,  vessels, or tendons, malunion, nonunion, symptomatic hardware, need for  hardware removal, stiffness, incomplete fusion, symptomatic hardware,  possible need for further surgery, and unforeseen complications. She  understood and wished to proceed. DESCRIPTION OF PROCEDURE:  The patient was identified in the holding  area. The left thumb was identified as the surgical site. She was then  seen by Anesthesia, taken to the operating room, placed supine on the  table, and underwent general anesthesia per Anesthesia Department. All  bony prominences were well padded. A well-padded arm tourniquet was  placed. The left upper extremity was prepared and draped in the  standard sterile fashion. Arm was exsanguinated. Tourniquet inflated  to 250 mmHg. Total tourniquet time was 71 minutes. A dorsal incision over the carpometacarpal joint was then created at the  base of the thumb extending proximally to the radial styloid.   The  radial sensory nerve branches were identified and released. The first  extensor compartment was mobilized and the interval between APL and EPB  tendon was then developed. The radial artery was then identified and  retracted and protected. Dorsal capsular incision longitudinally was  then created followed by mobilization of the flaps radially and ulnarly. The scaphotrapezoidal joint was severely arthritic. There was dorsal  subluxation of the thumb metacarpal consistent with a collapse  deformity. A combination of sharp and blunt dissection was used to  perform a trapeziectomy. The underlying FCR tendon was intact and  preserved. The scaphotrapezoidal joint did not have any significant  arthritic changes present. The volar oblique ligament was then reconstruction using a high-speed  bur to the base of the thumb metacarpal exiting palmarly and volarly  followed by copious irrigation. The FCR tendon was then harvested through a separate and distinct volar  incision over the previous scar of the FCR tendon. The FCR tendon was  mobilized in the subcutaneous tunnels proximally and distally. Another  transverse incision at the myotendinous origin was then created  proximally followed by harvest of the ulnar half of the FCR tendon using  a PDS suture distally, had mobilizing the tendon proximally, followed by  tenotomy, retrieval of the tendon distally, shuttled through the FCR  tunnel into the arthroplasty site using a Hewson passer, then the tendon  was then polished from palmar to dorsal and then back upon itself. Longitudinal traction and abduction force was applied and the tendon  secured in place with FiberWire suture. The wound was copiously  irrigated out. The remaining tendon was anchovy'd and placed deep  within the wound. Intraoperative fluoroscopy was used to confirm  trapeziectomy suspensionplasty. There was some laxity present.   Given  the patient's age and function, the volar oblique ligament _____ were  reinforced with the mini TightRope system. A guide pin for the mini TightRope was then placed under fluoroscopic  imaging to the base of the thumb metacarpal and into the index  metacarpal exiting ulnarly and mid access as assessed under fluoroscopy. A counter incision over the index metacarpal was then created followed  by blunt dissection and identification of the pin, which was brought  out. The mini TightRope was then shuttled using the placed guidewire,  secured in place over the base of the thumb metacarpal, and a second  button was placed on the index metacarpal and with appropriate tension  applied, the button was secured down firmly. Tension was set and  assessed such that the palm could be placed flat and to provide  reinforcement to the suspensionplasty and then secured down fully. The  wound was copiously irrigated out over the index metacarpal.  Vicryl 0  suture was then placed over the button bearing the knot stack. With  this accomplished, fluoroscopy was again confirmed excellent  suspensionplasty. Dorsal capsulodesis to the [facility] joint was then  completed with 0 Vicryl suture while protecting the radial artery. Attention was directed towards the MCP joint. Meticulous fluoroscopy  was used to assess the MCP joint. It was felt there was minimal  arthritic changes present. Given these findings, rather than effusion,  a volar capsulodesis was undertaken. An oblique incision over the  palmar aspect of the MCP joint was then created followed by blunt  dissection and identification of both the radial and ulnar neurovascular  bundles, which were protected. The A1 pulley was again assessed and  released. The tendon was retracted radially to reveal the volar  capsule. The volar capsule was released off the metacarpal proximally. This was followed by a guide pin placement of two guidewires with a  micro SwiveLock anchors, which were assessed under fluoroscopy. The  pins were adjusted.   These were then drilled. The metacarpal SwiveLock  was first placed with FiberTape and a FiberWire in it. This was secured  nicely. Good purchase was achieved. The FiberWire was then placed  through the mobilized volar capsule and with the MCP joint in flexion,  this was secured down nicely. This provided excellent capsulodesis,  which was then reinforced with a second SwiveLock anchor and a FiberTape  placed distally through the previously placed pin. Goniometer was used  to assess the flexion of about 15 to 20 degrees and the FiberTape  seated. With this in place, the gentle stress maneuvers revealed  excellent capsulodesis correcting the hyperextension deformity nicely. The wound was copiously irrigated out. The tourniquet was deflated. There was excellent healthy cap refill to the thumb. The volar incision  repaired with nylon suture. The radial artery was assessed dorsally and  found to be patent and hemostasis achieved. Skin closed in layers  followed by nylon sutures in the volar forearm. Sterile dressing and  splint was applied. The patient remained stable throughout the  procedure.         Kasie Pickens MD    D: 11/12/2021 9:13:49       T: 11/12/2021 9:18:30     [id number]  Job#: [id number]     Doc#: [id number]    CC:

## 2021-11-17 ENCOUNTER — TELEPHONE (OUTPATIENT)
Dept: ORTHOPEDIC SURGERY | Age: 56
End: 2021-11-17

## 2021-11-17 DIAGNOSIS — M18.12 ARTHRITIS OF CARPOMETACARPAL (CMC) JOINT OF LEFT THUMB: Primary | ICD-10-CM

## 2021-11-18 ENCOUNTER — OFFICE VISIT (OUTPATIENT)
Dept: ORTHOPEDIC SURGERY | Age: 56
End: 2021-11-18

## 2021-11-18 ENCOUNTER — EVALUATION (OUTPATIENT)
Dept: OCCUPATIONAL THERAPY | Age: 56
End: 2021-11-18
Payer: COMMERCIAL

## 2021-11-18 VITALS — RESPIRATION RATE: 18 BRPM | HEIGHT: 61 IN | BODY MASS INDEX: 20.77 KG/M2 | WEIGHT: 110 LBS

## 2021-11-18 DIAGNOSIS — M18.12 ARTHRITIS OF CARPOMETACARPAL (CMC) JOINT OF LEFT THUMB: Primary | ICD-10-CM

## 2021-11-18 DIAGNOSIS — M18.12 PRIMARY OSTEOARTHRITIS OF FIRST CARPOMETACARPAL JOINT OF LEFT HAND: Primary | ICD-10-CM

## 2021-11-18 PROCEDURE — 99024 POSTOP FOLLOW-UP VISIT: CPT | Performed by: ORTHOPAEDIC SURGERY

## 2021-11-18 PROCEDURE — 97530 THERAPEUTIC ACTIVITIES: CPT | Performed by: OCCUPATIONAL THERAPIST

## 2021-11-18 PROCEDURE — 97760 ORTHOTIC MGMT&TRAING 1ST ENC: CPT | Performed by: OCCUPATIONAL THERAPIST

## 2021-11-18 RX ORDER — PROMETHAZINE HYDROCHLORIDE 12.5 MG/1
12.5 TABLET ORAL 3 TIMES DAILY PRN
Qty: 21 TABLET | Refills: 0 | Status: SHIPPED | OUTPATIENT
Start: 2021-11-18 | End: 2021-11-25

## 2021-11-18 RX ORDER — HYDROCODONE BITARTRATE AND ACETAMINOPHEN 5; 325 MG/1; MG/1
1 TABLET ORAL EVERY 6 HOURS PRN
Qty: 28 TABLET | Refills: 0 | Status: SHIPPED
Start: 2021-11-18 | End: 2021-12-14 | Stop reason: SDUPTHER

## 2021-11-18 NOTE — PROGRESS NOTES
OCCUPATIONAL THERAPY EVALUATION/Splint Application Only   Phone: [phone]   Fax: [phone]     Date:  2021      Patient Name:  Mango Andres    :  1965    Restrictions/Precautions: Follow CMC interpositional arthroplasty tightrope and thumb MP joint palmar capsulodesis protocol, thumb IP motion and finger motion only at this time. Diagnosis:  M18.12 (ICD-10-CM) - Arthritis of carpometacarpal (CMC) joint of left thumb  See sx on 2021 below. Treatment Diagnosis:  same  Insurance/Certification information:  [facility]  Referring Physician:  Dr. Meme Gomez   Date of Surgery/Injury: sx 2021  Plan of care signed (Y/N):  n  Visit# / total visits:   ( pt to schedule OT evaluation in 2 - 3 weeks at the facility in [place]). Past Medical History:   Past Medical History:   Diagnosis Date    CMC arthritis     left thumb    Depression     Hyperlipidemia     slight not on meds    Insomnia     OA (osteoarthritis) of finger     hands    PONV (postoperative nausea and vomiting)     with anesthesia     Past Surgical History:   Past Surgical History:   Procedure Laterality Date    ARTHROPLASTY Left 2021    LEFT THUMB TRAPEZIECTOMY WITH LIGAMENT RECONSTRUCTION AND TENDON INTERPOSITION WITH  MINI TIGHT ROPE.  LEFT performed by Shasha Chaudhary MD at [address] [place] [address]  10/2007    COLONOSCOPY  2011    [facility]    FRACTURE SURGERY  aug 2012    open reduction internal fixation left wrist    HAND SURGERY Left 3/26/2021    LEFT WRIST HARDWARE REMOVAL performed by Shasha Chaudhary MD at [facility] HAND TENDON SURGERY Left 3/26/2021    FLEXOR TENOLYSIS LEFT THUMB FLEXOR TENDON RECONSTRUCTION WITH AUTOGRAFT  TENDON TRANSFER performed by Shasha Chaudhary MD at [address] [place] [address] ARTHROSCOPY Right 2021    RIGHT SHOULDER ARTHROSCOPY, SUBACROMIAL DECOMPRESSION, ROTATOR CUFF REPAIR AND DEBRIDEMENT ([address]) performed by Rigo Maria DO at [facility] [address] [facility]  [address] GASTROINTESTINAL ENDOSCOPY  08/09/2011       Reason for Referral: Pt was referred to OT for splint fabrication after surgeries she had on 11/12/2021 which included the following procedures:   PREOPERATIVE DIAGNOSES:  1. Left thumb carpometacarpal joint arthritis. 2.  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     POSTOPERATIVE DIAGNOSES:  1. Left thumb carpometacarpal joint arthritis. 2.  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     PROCEDURES PERFORMED:  1. Left thumb trapeziectomy. 2.  Left thumb volar oblique ligament reconstruction using flexor carpi  radialis tendon split. 3.  Application of mini TightRope, left thumb CMC joint. 4.  [place] of flexor carpi radialis tendon for volar oblique ligament  reconstruction. 5.  Dorsal capsulodesis of left thumb CMC joint. 6.  Left metacarpophalangeal joint palmar capsulodesis for MCP joint  instability, reinforced with Arthrex internal brace system. She presents today with her post op dressing removed and a light gauze dressing on. She states she is going to return to the Dr next week for stitch removal.  She states her post op dressing was very tight and painful and she was glad to get it off. Her hand felt better by the end of the session. Therapist reveiwed her precautions of positions to avoid and why she was wearing the protective splint. Splint Fabricated/Provided: Wrist cock-up thumb spica splint was fabricated. The splint allowed thumb IP motion and finger motion only. Education Provided: Patient was provided with verbal education/handout regarding splint care, wear, precautions, and hygiene    Splint Care: Splint can be washed with mild soap and cool water. Splint needs to air dry. Avoid leaving splint in or near a source of heat such as a hot car or a space heater. Use nail polish remover to remove stains on splint.  Use Purell will decrease any odor that may develop. Splint Precuations: Patient was instructed to remove splint and contact therapist if the following occur:   1. Redness that lasts longer that 15-20 mins   2. Increased swelling   3. Increased pain   4. Pressure Sores    Wear Schedule: Pt to wear 24/7. Can remove for shower if she does not use her L hand. Can remove her splint to air the hand out if she is doing nothing. Splint Charge : splint fabrication x's 2, thera activity x's one. Profile and History- from pt and physician notes. Rehab Potential: Good   Recommendations: Outpatient OT  Goal Formulation: Patient  Requires OT Follow Up: Yes    Plan   Plan: Plan of care initiated. Pt was seen today for splint only. Pt may need an additional visit for splint modification. Pt was instructed to call the [facility] clinic or  if splint adjustments are needed before her OT evaluation. She will have her evaluation in 2 weeks- to be scheduled. Goals (1 session):  1. Patient will verbalize and demo ability to don/doff splint appropriately in 1 session with good accuracy   Goal Met: YES    2. Patient will verbalize understanding of splint precautions, splint care, and wear schedule in 1 session   Goal Met: Yes   3. Patient will demonstrate understand of stretching and/or exercise provided in 1 session. Goal Met: Yes. Thumb IP motion -24*/ 40*  Digtial DPC  All digits range from 4 - 6 cm. Pt was shown how to do blocked thumb IP exercises 4 - 6 x's a day. Pt was also shown tendon gliding exercises - to do the hook fisting and MP flex/ext - and do less rep's with full fisting -  6 x's' a day. Pt 's edema and finger mobiity had ^'ed by end of session. Pt appeared to understand all instructions. Please review Patient's OT evaluation and if you agree sign/date and fax back to us at our [address] [facility]  fax # [phone].     Total Tx Time: 50 min     Physician's Signature:____________________________ Date:__________________ Adelia Cranker, OT/L, T  OT 90

## 2021-11-23 ENCOUNTER — NURSE ONLY (OUTPATIENT)
Dept: ORTHOPEDIC SURGERY | Age: 56
End: 2021-11-23

## 2021-11-23 NOTE — PROGRESS NOTES
Patient presents today POD #11 s/p left thumb trapeziectomy with ligament reconstruction and tendon interposition with MCP joint capsulodesis. Patient here for suture removal today. incisions c/d/i with sutures in place. No erythema or signs of infection. Cleansed incisions with iodine and alcohol and sutures were removed, patient tolerated this well. bandaids and a dry dressing were placed over incisions. Instructed patient to keep these on for next 12-24hrs then can start to get wet in the shower, patient verbalized understanding. All questions answered. Patient to f/u with Dr. Jeronimo Wright 12/14/21.

## 2021-11-28 NOTE — PROGRESS NOTES
OCCUPATIONAL THERAPY INITIAL EVALUATION    [facility]  [facility]CC[facility]ATIONAL [facility]  [address] [place] [address]  Dept: [address] OT Fax: [phone]    Date:  2021  Initial Evaluation Date: 2021   Evaluating Therapist: Jenise Sheffield    Patient Name:  Karma Arora    :  1965    Restrictions/Precautions: Follow flexor tendon Passive flexion , Active ext protocol, AORM , Can start AROM at 4 weeks post op - 21, low fall risk  Diagnosis:  Thumb FPL rupture/repair L hand   S66.819A (ICD-10-CM) - Rupture of flexor tendon of hand, initial encounter       Insurance/Certification information:  [facility]   Referring Practitioner:  Dr.A Shadi Sheridan  Date of Surgery/Injury: 3/26/2021  Plan of care signed (Y/N): N  Visit# / total visits:     Past Medical History:   Past Medical History:   Diagnosis Date    Depression     Hyperlipidemia     slight not on meds    OA (osteoarthritis) of finger     hands    PONV (postoperative nausea and vomiting)     with anesthesia     Past Surgical History:   Past Surgical History:   Procedure Laterality Date    CHOLECYSTECTOMY  10/2007    COLONOSCOPY  2011    DODIG    FRACTURE SURGERY  aug 2012    open reduction internal fixation left wrist    HAND SURGERY Left 3/26/2021    LEFT WRIST HARDWARE REMOVAL performed by Alec Baker MD at [facility] HAND TENDON SURGERY Left 3/26/2021    FLEXOR TENOLYSIS LEFT THUMB FLEXOR TENDON RECONSTRUCTION WITH AUTOGRAFT  TENDON TRANSFER performed by Alec Baker MD at [address] ARTHROSCOPY Right 2021    RIGHT SHOULDER ARTHROSCOPY, SUBACROMIAL DECOMPRESSION, ROTATOR CUFF REPAIR AND DEBRIDEMENT ( [facility]) performed by Elizabeth Sweeney DO at [address]  2011       Reason for Referral: Pt has a wrist fx in  with ORIF.   She eventually had  FPL rupture from the plate and on  she had the following surgical procedures:  PROCEDURES PERFORMED:  1. Left middle finger DIP joint fusion using a [employer] compression  screw. 2.  Left wrist removal of deep retained hardware. 3.  Left forearm flexor tenolysis and excision of scar tissue. 4.  Left thumb flexor pollicis longus tendon reconstruction with the use  of interposition graft. 5.  Dailey of extensor indicis proprius tendon for flexor tendon  reconstruction. 6.  Debridement of partial attritional rupture of middle finger  profundus tendon at the level of the distal forearm. She presents today wearing the dressing she had put on after her stiches were taken out on 4/6/4021- Thursday. She is wearing the  DIP ext splint held on with coban. She has OA nodules on her DIP jt in her MF thru SF - the middle finger would dislocate and so she chose to have sx - fusion. Home Living: Lives in a house with her . She has grown children. 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 . her  is now doing most tasks as she can only use her R arm and she had rotator cuff sx on her R shoulder on 1/22/2021. She is driving short distances. She is working at home half days this week and next week and the following week will have full days. 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 She is on the phone and computer most of the day and using her R arm. She owns horses and boards them - her  is doing all the work in the stables at this time.      ADL STATUS:   Ind Mod I Min A Mod A Max A Dep Other   Feeding:   x       Grooming: x         Bathing:   x       UE Dressing:   x       LE Dressing:   x       Toileting: x Transfers: x           Comments: Pt needs A to wash her back in the shower, fasten her bra and ossacionally fastin her pants, don socks and ties shoes. Pain Level: :Pain at rest is 0-3/10  Pain with activity ( moving her fingers or thumb exercises) 4/10  Pt taking OTC as needed for pain. UE Assessment:  PT has AROM and MG WNL's in her L shoulder, elbow and forearm. lmtations as follows:    Left  Upper Extremity ROM    WRIST Flexion 0-70* 61*       Extension 0-80* To neutral       Radial Deviation 0-20* 10*       Ulnar Deviation 0-30* 27*        Left  Hand ROM   AROM []         PROM[]       THUMB MP Extension/Flexion  14-23* H /0-50* 0/25*- P       IP Extension/Flexion  23-30* H/ 0-80* -5*/60*-P       Radial Abduction 53-71* NT       Palmar Abduction 50-71* 45* A  55* P          AROM [x]         PROM[]         IF Digit MCP Extension/Flexion   0-45 H /* 65*       PIP Extension/Flexion   0*/100* 67*       DIP Extension/Flexion   0*/70-90* 0*        MF Digit MCP Extension/Flexion   0-45 H /* 60*       PIP Extension/Flexion   0*/100* 38*A  54* P       DIP Extension/Flexion   0*/70-90* NA        RF Digit MCP Extension/Flexion   0-45 H /* 55*       PIP Extension/Flexion   0*/100* 55*       DIP Extension/Flexion   0*/70-90* -20*/40*        SF Digit MCP Extension/Flexion   0-45 H /* 55*       PIP Extension/Flexion   0*/100* 62*       DIP Extension/Flexion   0*/70-90* -20*/45*         Pt has OA 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 She states the one on her MF irritated her all the time and her DIP jt would dislocate - encouraging her to have the DIP fusion at this time. Pt thumb was held in 37* IP flexion upon entering the clinic. By the end of session IP ext had ^'ed to -5*. Comment: Hand Dominance is Right    Sensation: WNL's. Pt c/o occasional numbness in her SF.   Therapist told her to try to keep her elbow extended to put less tension pressure on her ulnar nerve. Edema Description/Circumferential Measurements:   Pt has slight +  edema in her L thumb and slight in her fingers. Dynamometer (setting 2):     Left: NT      Right: NT      Pinch Meter:   Lateral: Left= NT,Right= NT    Palmar 3 point: Left= NT, Right= NT    9 Hole Peg Test:   Left: NT   Right: NT    QuickDASH Score: 75% disability    Intervention: Pt was given a HEP - see attached sheet. This included edema control techniques, gentle wrist motion ( full flexion - ext to neutral only), digital MP and PIP flex/ext, thumb AROM for thumb ext and PROM thumb flexion - the IP jt, MP jt and composite ( with her DBS on). Therapist fabricated a DBS holding her wrist in about 15* flexion, allowing digital motion and holding the thumb MP jt in flexion and IP in extension. Therapist fabricated the splint with slight pull in the web space as she was adducted fully in her post op splint. The walked in with her Thumb IP held at 37* flexion but by the end of the session it could be passivily extended to -5*. Pt was able to don and doff the splint. Pt is wearing a metal tip ext splint for her MF DIP jt holding it in ext - applied with coban. Pt is tolerated this splint well - therapist told her if she needs another  one in the future we can fabricate one. Pt appeared to understand all instructions. Goals were mutually agreed upon with the pt. .    Assessment of current deficits   Functional mobility []  ADLs [x] Strength [x]  Cognition []  Functional transfers  [] IADLs [] Safety Awareness []  Endurance [x]  Fine Motor Coordination [x] Balance [] Vision/perception [] Sensation []   Gross Motor Coordination [] ROM [x] Pain [x]  Edema [x]      Eval Complexity: MOd level charged, there exer x's 1, splint fabrication x's 2  Profile and History- history from pt and physician notes   Assessment of Occupational Performance and Identification of Deficits- Pt has 7 functional deficits. Clinical Decision Making- pt has had a rotator cuff sx on her R shoulder 1/22/2021    Rehab Potential:                                 [x] Good  [] Fair  [] Poor        Suggested Professional Referral:       [x] No  [] Yes:  Barriers to Goal Achievement[de-identified]          [x] No  [] Yes:  Domestic Concerns:                           [x] No  [] Yes:     Goal Formulation: Patient  Time In: 8:05 am           Time Out: 9:35 am                       Timed Code Treatment Minutes: 90 minutes     PLAN      Treatment to include:   [x] Instruction in HEP                   Modalities:  [x] Therapeutic Exercise        [x] Ultrasound               [] Electrical Stimulation/Attended  [x] PROM/Stretching                    [x] Fluidotherapy          [x]  Paraffin                   [] AAROM  [x] AROM                 [] Iontophoresis: 4 mg/mL; Dexamethasone Sodium                                        [] Neuromuscular Re-education [x] Splinting         [] Desensitization          [x] Therapeutic Activity       [x] Pain Management with/without modalities PRN                 [] Manual Therapy/Fascial release                            [x] Tendon Glides        []Joint Protection/Training  []Ergonomics                             [x] Joint Mobilization        [] Adaptive Equipment Assessment/Training                             [x] Manual Edema Mobilization    [] Energy Conservation/Work Simplification  [x] GM/FM Coordination       [] Safety retraining/education per  individual diagnosis/goals  [x] Scar Management        [] ADL/IADL re-training       Patient Specific Goal: To heal and be able to use her L hand again normally. GOALS (Long term same as Short term):  1. ) Patient will demonstrate good understanding of home program (exercises/activities/diagnosis/prognosis/goals) with good accuracy.    2. ) Patient will demonstrate increased active/passive range of motion of their Left wrist, thumb and digits to [facility] for ADL/IADL completion. 3. ) Patient will demonstrate increased /pinch strength of at least 10 / 5 pinch pounds of their Left hand. 4. ) Patient to report decreased pain in their affected Left  upper extremity from 4/10 to 1/10 or less with resistive functional use. 5. ) Increase in fine motor function as evidenced by decreased time to complete 9-hole peg test by at least 5-10 seconds. 6. ) Patient to report 100% compliance with their splint wear, care, and precautions if needed. 7. ) Patient will be knowledgeable of edema control techniques as evident with decreases from slight to none. 8. ) Patient will demonstrate a non-tender/non-adherent scar. 9. ) Patient will report ADL functions as Mod I/I using L UE .   10 . ) Patient will decrease QuickDASH score to 75% or less for increased participation in daily functional activities. Patient. Education:  [x] Plans/Goals, Risks/Benefits discussed  [x] Home exercise program  Method of Education: [x] Verbal  [x] Demo  [x] Written  Comprehension of Education:  [x] Verbalizes understanding. [x] Demonstrates understanding. [] Needs Review. [] Demonstrates/verbalizes understanding of HEP/Ed previously given. Patient understands diagnosis/prognosis and consents to treatment, plan and goals: [x] Yes    [] No      Electronically signed by: Merced Lynn OT/JOSIE HAMMER       Physician's Certification / Comments      Frequency/Duration 2 x's a week  / week for 16 visits. Certification period From: this date  To: 7/8/2021     I have reviewed the Plan of Care established for skilled therapy services and certify that the services are required and that they will be provided while the patient is under my care.      Physician's Comments/Revisions:                   Physicians's Printed Name:  Dr. Jelena Busby                                   Physician's Signature:                                                               Date:       Please Satisfactory

## 2021-12-14 ENCOUNTER — OFFICE VISIT (OUTPATIENT)
Dept: ORTHOPEDIC SURGERY | Age: 56
End: 2021-12-14

## 2021-12-14 ENCOUNTER — TELEPHONE (OUTPATIENT)
Dept: ORTHOPEDIC SURGERY | Age: 56
End: 2021-12-14

## 2021-12-14 VITALS — RESPIRATION RATE: 20 BRPM | HEIGHT: 60 IN | WEIGHT: 110 LBS | BODY MASS INDEX: 21.6 KG/M2

## 2021-12-14 DIAGNOSIS — M18.12 ARTHRITIS OF CARPOMETACARPAL (CMC) JOINT OF LEFT THUMB: Primary | ICD-10-CM

## 2021-12-14 DIAGNOSIS — M18.12 ARTHRITIS OF CARPOMETACARPAL (CMC) JOINT OF LEFT THUMB: ICD-10-CM

## 2021-12-14 PROCEDURE — 99024 POSTOP FOLLOW-UP VISIT: CPT | Performed by: ORTHOPAEDIC SURGERY

## 2021-12-14 RX ORDER — HYDROCODONE BITARTRATE AND ACETAMINOPHEN 5; 325 MG/1; MG/1
1 TABLET ORAL EVERY 6 HOURS PRN
Qty: 28 TABLET | Refills: 0 | Status: SHIPPED
Start: 2021-12-14 | End: 2022-01-11 | Stop reason: SDUPTHER

## 2021-12-14 NOTE — PROGRESS NOTES
HPI: Patient presents today just over 4 weeks s/p left thumb trapeziectomy with ligament reconstruction and tendon interposition with MCP joint capsulodesis. Overall the patient is doing well. She is scheduled for therapy tomorrow. She does report tingling and burning into her thumb. Physical Exam: incisions healing well. No erythema or signs of infection. + tinels of the radial sensory nerve. Full flexion and extension of the index, middle, ring, and small fingers. Median, ulnar, radial n intact to light touch. Brisk capillary refill. Xray: x-rays of the left hand were obtained today in the office and reviewed with the patient. 3 views: AP/lateral/oblique : demonstrate stable thumb trapeziectomy with ligament reconstruction. Mini-tight rope remains in place. Anchors for MCP joint capsulodesis in place. Impression: stable postoperative changes. Assessment: 4 weeks s/p left thumb trapeziectomy with ligament reconstruction and tendon interposition with MCP joint capsulodesis with radial sensory neuritis     Plan:   Patient referred to therapy. Okay to wean out of splint. Start early ROM exercises. Strengthening at 6-8 weeks post op. Activity restrictions reviewed with the patient. Okay for light gentle ROM exercises on her own. This was demonstrated to her. Refill of norco provided. Follow up in 4 weeks with repeat xrays     Informed consent was obtained for continued opioid treatment. Patient agrees to continue the current treatment and agrees the benefits of opioid treatment ( decreased pain, improved function) continue to outweigh the potential risks ( confusion, change in thinking ability, depression, dry mouth, nausea, constipation, vomiting, impaired coordination/balance, sleepiness, damage liver or kidneys, opioid-induced hyperalgesia, physical dependence, addiction, withdrawal, respiratory depression and even death).     I have seen and evaluated the patient and agree with the above assessment and plan on today's visit. I have performed the key components of the history and physical examination with significant findings of doing well postop. I concur with the findings and plan as documented.     Torri Blue MD  12/14/2021

## 2021-12-14 NOTE — PROGRESS NOTES
OCCUPATIONAL THERAPY INITIAL EVALUATION    [address] ANCILLARY SERVICES  [facility] OCCUPATIONAL THERAPY   [address] [facility] [address]  Dept: [address] Fax: [phone]    Date:  12/15/2021  Initial Evaluation Date: 12/15/21     Evaluating Therapist: Kathryn Steiner OT/HARMAN  [id number]    Patient Name:  Diana Andrew    :  1965    Restrictions/Precautions:  Left hand ROM as tolerated, wean from brace, modalities prn, no strengthening till 8 weeks , Low fall risk  Diagnosis:  M18.12 (ICD-10-CM) - Arthritis of carpometacarpal [place]) joint of left thumb       Date of Surgery/Injury:     Insurance/Certification information:  [facility] Choice  Plan of care signed (Y/N): N  Visit# / total visits:     Referring Practitioner:  Dr Tabitha Galindo  Specific Practitioner Orders: OT evaluate and treat: per last office note : Patient referred to therapy. Okay to wean out of splint. Start early ROM exercises. Strengthening at 6-8 weeks post op.     Assessment of current deficits   [] Functional mobility   [x] ADLs  [x] Strength   [] Cognition   [] Functional transfers   [x] IADLs  [] Safety Awareness  [] Endurance   [x] Fine Motor Coordination  [] Balance  [] Vision/perception  [x] Sensation    [] Gross Motor Coordination [x] ROM  [x] Pain   [x] Edema    [x] Scar Adhesion/Skin Integrity     OT PLAN OF CARE   OT POC based on physician orders, patient diagnosis and results of clinical assessment    Frequency/Duration: 2x/ week x 6 weeks  Specific OT Treatment to include:     [x] Instruction in HEP                   Modalities:  [x] Therapeutic Exercise        [x] Ultrasound               [] Electrical Stimulation/Attended  [x] PROM/Stretching                    [x] Fluidotherapy          [x]  Paraffin                   [x] AAROM  [x] AROM                 [] Iontophoresis:   [] Tendon Glides                                               [] Neuromuscular Re-Ed            [] ADL/IADL re-training    [x] Therapeutic Activity       [x] Pain Management with/without modalities PRN                 [x] Manual Therapy                      [x] Splinting                      [x] Scar Management                   []Joint Protection/Training  []Ergonomics                             [] Joint Mobilization        [] Adaptive Equipment Assessment/Training                             [x] Manual Edema Mobilization   [] Myofascial Release                 [] Energy Conservation/Work Simplification  [x] GM/FM Coordination       [] Safety retraining/education per  individual diagnosis/goals  [x] Desensitization       Patient Specific Goal: \" I need to heal so I can get back to normal\"                             GOALS (Long term same as Short term):  1) Patient will demonstrate good understanding of home program (exercises/activities/diagnosis/prognosis/goals) with good accuracy. 2) Patient will demonstrate increased active/passive range of motion of their left hand/ wrist to [facility] for ADL/IADL completion. 3) Patient will demonstrate /pinch strength of at least 30 / 5 pinch pounds of their left hand. 4) Patient to report decreased pain in their affected left distal upper extremity from 3-10/10 with light use to 3/10 or less with resistive functional use. 5) Increase in fine motor function as evidenced by <30 sec time to complete 9-hole peg test.  6) Patient to demonstrate decreased guarding of their affected extremity from >75% to 20% or less. 7) Patient will demonstrate a non-tender/non-adherent scar. 8) Patient will report ADL/ IADL  functions as I using the left wrist/ hand. Jeanne Barone 9) Patient will decrease QuickDASH score to 30% or less for increased participation in daily functional activities.        Past Medical History:   Past Medical History:   Diagnosis Date    CMC arthritis     left thumb    Depression     Hyperlipidemia     slight not on meds    Insomnia     OA (osteoarthritis) of finger     hands    PONV (postoperative nausea and vomiting)     with anesthesia     Past Surgical History:   Past Surgical History:   Procedure Laterality Date    ARTHROPLASTY Left 11/12/2021    LEFT THUMB TRAPEZIECTOMY WITH LIGAMENT RECONSTRUCTION AND TENDON INTERPOSITION WITH  MINI TIGHT ROPE. LEFT performed by Kiera Daniels MD at [address]  10/2007    COLONOSCOPY  08/09/2011    [address]    FRACTURE SURGERY  aug 2012    open reduction internal fixation left wrist    HAND SURGERY Left 3/26/2021    LEFT WRIST HARDWARE REMOVAL performed by Kiera Daniels MD at [facility] HAND TENDON SURGERY Left 3/26/2021    FLEXOR TENOLYSIS LEFT THUMB FLEXOR TENDON RECONSTRUCTION WITH AUTOGRAFT  TENDON TRANSFER performed by Kiera Daniels MD at [address] [facility] ARTHROSCOPY Right 1/22/2021    RIGHT SHOULDER ARTHROSCOPY, SUBACROMIAL DECOMPRESSION, ROTATOR CUFF REPAIR AND DEBRIDEMENT ( [facility]) performed by Yanet Brito DO at [address]  08/09/2011       Reason for Referral: The patient presents with a Hx of left thumb pain and weakness. She is 4 months postop FPL tendon reconstruction with palmaris longus graft. She was doing quite well with the tendon reconstruction and had full thumb IP joint motion. Her biggest complaint is pain at the base of the thumb [facility] joint as well as hyperextension deformity of the MCP joint. This limits her  strength and pinch strength. She would like to proceed with surgical intervention. Mauricio Mian DATE OF PROCEDURE:  11/12/2021     PREOPERATIVE DIAGNOSES:  1. Left thumb carpometacarpal joint arthritis. 2.  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     POSTOPERATIVE DIAGNOSES:  1. Left thumb carpometacarpal joint arthritis. 2.  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     PROCEDURES PERFORMED:  1. Left thumb trapeziectomy.   2.  Left thumb volar oblique ligament reconstruction using flexor carpi  radialis tendon split. 3.  Application of mini TightRope, left thumb CMC joint. 4.  Weldon of flexor carpi radialis tendon for volar oblique ligament  reconstruction. 5.  Dorsal capsulodesis of left thumb CMC joint. 6.  Left metacarpophalangeal joint palmar capsulodesis for MCP joint  instability, reinforced with Arthrex internal brace system.     Pt cc: includes numbness in the left hand/ thumb and hand/ wrist pain. Mild swelling is present in the left radial wrist/ hand. Incisions are well healed in the volar forearm, dorsal thumb and volar thumb. Tenderness is present with palpation to the areas with the most hypersensitive being the volar thumb. Pt has a custom thumb spica splint that has loosened due to decreased swelling and needs minor repairs to the velcro hook. Pt wears the splint most of the time except for when resting in the evening. Home Living: Lives in a house with her . 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 All homemaking and chores are completed with over use of the right hand. Tasks like opening containers and holding onto moderate to heavy items is difficult. ADL STATUS:   Ind Mod I Min A Mod A Max A Dep Other   Feeding:  x        Grooming:  x        Bathing:  x        UE Dressing:  x        LE Dressing:  x        Toileting:  x        Transfers: x           Comments: All self care requires primary use of the right hand and increased time and compensation for the thumb.      Pain Level: 3-10 on scale of 1-10, burning, sharp, shooting, tight (pulling) and uncomfortable    UE Assessment: RHD    Left UE AROM: Exceptions to WNL              12-15-21     Wrist flexion           0-50      Wrist extension           0-30      RD           0-25      UD           0-6      Thumb IP           0-45      Thumb MP           15-30     Thumb radial extension            0-35      Thumb Palmar ABD            25-35               Sensation: Left  Able To Sense (Y) / Unable to Sense (N)  SEMMES-DAMION Thumb 2nd Digit 3rd Digit  4th Digit  5th Digit    Normal Touch  Size: 2.83        Diminished Light Touch   Size: 3.61 N N Y Y Y   Diminished Protective Sense  Size: 4.31 Y Y Y Y Y   Loss of Protective Sense   Size: 4.56        Loss of Sensation  Size: 6.65            Dynamometer (setting 2): TBA         Pinch Meter: TBA     9 Hole Peg Test:   Left: 15.6 sec out only/ pt not able to place pegs in holes     QuickDASH Score: 57% disability    Intervention: Tx started with a focus on gentle AROM and retrograde massage to the radial hand/ thumb. All activity is encouraged to be completed 3x/ day or as tolerated at home. AROM completed includes:  - Thumb radial ext  - Thumb palmar ABD  - IP blocking ex  - MP blocking ex with CMC support    Minor splint repair completed to replace velcro hook and extra splint liners were also provided.  (therapeutic activity- 15 min)     Eval Complexity: Moderate  Profile and History- Interview, op notes, MD notes  Assessment of Occupational Performance and Identification of Deficits- 9 performance deficits   Clinical Decision Making- modifications required/ co-morbidity left thumb FPL repair    Rehab Potential:                                 [x] Good  [] Fair  [] Poor        Suggested Professional Referral:       [x] No  [] Yes:  Barriers to Goal Achievement[de-identified]          [x] No  [] Yes:  Domestic Concerns:                           [x] No  [] Yes:       Patient.  Education:  [x] Plans/Goals, Risks/Benefits discussed  [x] Home exercise program  Method of Education: [x] Verbal  [x] Demo  [x] Written  Comprehension of Education:  [x] Verbalizes understanding. [x] Demonstrates understanding. [x] Needs Review. [] Demonstrates/verbalizes understanding of HEP/Ed previously given. Patient understands diagnosis/prognosis and consents to treatment, plan and goals:   [x] Yes    [] No     Goal Formulation: Patient  Time In: 0730            Time Out: 0830                      Timed Code Treatment Minutes: 60 minutes      CODE  Minutes  Units   32319 OT Eval Low     00245 OT Eval Medium 45 1   44286 OT Eval High     67736 Fluidotherapy     73455 Manual     66040 Therapeutic Ex     81056 Therapeutic Activity 15 1   93990 ADL/COMP Tech Train     [id number] Neuromuscular Re-Ed     53352 OrthoManagementTraining     17052 Paraffin     89892 Electrical Stim - Attended     [id number] Iontophoresis     71296 Ultrasound      Other                Electronically signed by: Tonie Bolanos OT /HARMAN  [id number]     [email or web address] Certification / Comments      Frequency/Duration 2 / week for 12 visits. Certification period From: 12-15-21  To: 3-15-22     I have reviewed the Plan of Care established for skilled therapy services and certify that the services are required and that they will be provided while the patient is under my care. Physician's Comments/Revisions:           Physicians's Printed Name:  Dr Daryl Castro                                   Physician's Signature:                                                               Date:      Please review Patient's OT evaluation and if you agree sign/date and fax back to us at our [facility] [place] [facility] OT Fax: [phone].  Thank you for your referral!

## 2021-12-15 ENCOUNTER — EVALUATION (OUTPATIENT)
Dept: OCCUPATIONAL THERAPY | Age: 56
End: 2021-12-15
Payer: COMMERCIAL

## 2021-12-15 DIAGNOSIS — M18.12 PRIMARY OSTEOARTHRITIS OF FIRST CARPOMETACARPAL JOINT OF LEFT HAND: Primary | ICD-10-CM

## 2021-12-15 PROCEDURE — 97530 THERAPEUTIC ACTIVITIES: CPT | Performed by: OCCUPATIONAL THERAPIST

## 2021-12-15 PROCEDURE — 97166 OT EVAL MOD COMPLEX 45 MIN: CPT | Performed by: OCCUPATIONAL THERAPIST

## 2021-12-17 ENCOUNTER — TREATMENT (OUTPATIENT)
Dept: OCCUPATIONAL THERAPY | Age: 56
End: 2021-12-17
Payer: COMMERCIAL

## 2021-12-17 DIAGNOSIS — M18.12 PRIMARY OSTEOARTHRITIS OF FIRST CARPOMETACARPAL JOINT OF LEFT HAND: Primary | ICD-10-CM

## 2021-12-17 PROCEDURE — 97022 WHIRLPOOL THERAPY: CPT | Performed by: OCCUPATIONAL THERAPIST

## 2021-12-17 PROCEDURE — 97110 THERAPEUTIC EXERCISES: CPT | Performed by: OCCUPATIONAL THERAPIST

## 2021-12-17 PROCEDURE — 97140 MANUAL THERAPY 1/> REGIONS: CPT | Performed by: OCCUPATIONAL THERAPIST

## 2021-12-17 NOTE — PROGRESS NOTES
[facility] [place] [address] [facility] [address]  Dept: [address] OT Fax: [phone]    OCCUPATIONAL THERAPY PROGRESS NOTE    Date:  2021  Initial Evaluation Date: 12-15-21    Patient Name:  Francisco Gomes    :  1965    Restrictions/Precautions:  Left hand ROM as tolerated, wean from brace, modalities prn, no strengthening till 8 weeks , Low fall risk  Diagnosis:  M18.12 (ICD-10-CM) - Arthritis of carpometacarpal [place]) joint of left thumb                                                   Date of Surgery/Injury: 93/34/15     Insurance/Certification information:  [facility] Choice  Plan of care signed (Y/N): Y (thru 3-15-22)  Visit# / total visits:      Referring Practitioner:  Dr Dejuan Hoyt  Specific Practitioner Orders: OT evaluate and treat: per last office note : Patient referred to therapy. Okay to wean out of splint. Start early ROM exercises. Strengthening at 6-8 weeks post op.     Assessment of current deficits   []? Functional mobility             [x]? ADLs          [x]? Strength                  []? Cognition   []? Functional transfers           [x]? IADLs         []? Safety Awareness  []? Endurance   [x]? Fine Motor Coordination    []? Balance      []? Vision/perception   [x]? Sensation     []? Gross Motor Coordination [x]? ROM           [x]? Pain                        [x]? Edema          [x]? Scar Adhesion/Skin Integrity      OT PLAN OF CARE   OT POC based on physician orders, patient diagnosis and results of clinical assessment     Frequency/Duration: 2x/ week x 6 weeks  Specific OT Treatment to include:      [x]? Instruction in HEP                   Modalities:  [x]?  Therapeutic Exercise                 [x]? Ultrasound               []? Electrical Stimulation/Attended  [x]? PROM/Stretching                    [x]? Fluidotherapy          [x]?   Paraffin                   [x]? AAROM  [x]?  AROM                 []? Iontophoresis:   []? Tendon Claire Centeno  []? Neuromuscular Re-Ed            []? ADL/IADL re-training    [x]?  Therapeutic Activity                  [x]? Pain Management with/without modalities PRN                 [x]?  Manual Therapy                      [x]? Splinting                                   [x]? Scar Management                   []?Joint Protection/Training  []? Ergonomics                             []? Joint Mobilization                      []? Adaptive Equipment Assessment/Training                             [x]?  Manual Edema Mobilization   []? Myofascial Release                 []? Energy Conservation/Work Simplification  [x]? GM/FM Coordination                []? Safety retraining/education per  individual diagnosis/goals  [x]? Desensitization        Patient Specific Goal: \" I need to heal so I can get back to normal\"                             GOALS (Long term same as Short term):  1) Patient will demonstrate good understanding of home program (exercises/activities/diagnosis/prognosis/goals) with good accuracy. 2) Patient will demonstrate increased active/passive range of motion of their left hand/ wrist to [facility] for ADL/IADL completion. 3) Patient will demonstrate /pinch strength of at least 30 / 5 pinch pounds of their left hand. 4) Patient to report decreased pain in their affected left distal upper extremity from 3-10/10 with light use to 3/10 or less with resistive functional use. 5) Increase in fine motor function as evidenced by <30 sec time to complete 9-hole peg test.  6) Patient to demonstrate decreased guarding of their affected extremity from >75% to 20% or less. 7) Patient will demonstrate a non-tender/non-adherent scar. 8) Patient will report ADL/ IADL  functions as I using the left wrist/ hand. Leonel Granado    9) Patient will decrease QuickDASH score to 30% or less for increased participation in daily functional activities.         Pain Level: mild, uncomfortable in the L wrist/ thumb    Subjective: \" My hand isn't as sensitive as it was. I thinking moving more is helping. \"   Objective:  Updated POC to be completed by 1-15-22. INTERVENTION: COMPLETED: SPECIFICS/COMMENTS:   Modality:     Fluidotherapy L x 10 min/ low heat in conjunction with wrist/ thumb AROM   US L thumb/ wrist x  5 min, 3.3 MHz, 50% with attention to the web space and thumb/ radial wrist- tolerated well   AROM:     Wrist/ Hand  AROM X  x - tenodesis as tolerated  - grasp release with cones to increase thumb ABD   Thumb AROM X  x - blocking ex at IP and MP  - AROM all planes with brief hold on end range     AAROM:               PROM/Stretching:               Scar Mass/Edema Control:     Scar massage x - less numbness on incisions    Soft tissue mob x - focus on the thenar muscles and wrist   Strengthening:               Other:     HEP x Scar massage, blocking ex IP/MP, CMC AROM all planes, wrist tenodesis as tolerated. Assessment/Comments: Pt is making Fair + progress toward stated plan of care. Tx started with a focus on edema control, scar massage and wrist/ thumb AROM. Pt reports feeling better today than on evaluation. Pt reports moving the thumb more at home. Will advance as tolerated. -Rehab Potential: Good  -Requires OT Follow Up: Yes  Time In:0730            Time Out: 0815             Visit #: 2    Treatment Charges: Mins Units   Modalities: FL/ US 10/5 1   Ther Exercise 20 1   Manual Therapy 10 1   Thera Activities     ADL/Home Mgt      Neuro Re-education     Group Therapy     Non-Billable Service Time     Other     Total Time/Units 45 3       -Response to Treatment: Pt is happy with her lower pain levels and ability to move the thumb better. Goals: Goals for pt can be seen on initial eval occurring on 12-15-21    Plan:   [x]  Continue Plan of care: Continue AROM ex. Wean from brace for light tasks as tolerated.  Pt education continues at each visit to obtain maximum benefits from skilled OT intervention.   []  Alter Plan of care:   []  Discharge:      Dylan Mc OT /L  [id number]

## 2021-12-21 ENCOUNTER — TREATMENT (OUTPATIENT)
Dept: OCCUPATIONAL THERAPY | Age: 56
End: 2021-12-21
Payer: COMMERCIAL

## 2021-12-21 DIAGNOSIS — M18.12 PRIMARY OSTEOARTHRITIS OF FIRST CARPOMETACARPAL JOINT OF LEFT HAND: Primary | ICD-10-CM

## 2021-12-21 PROCEDURE — 97530 THERAPEUTIC ACTIVITIES: CPT | Performed by: OCCUPATIONAL THERAPIST

## 2021-12-21 PROCEDURE — 97022 WHIRLPOOL THERAPY: CPT | Performed by: OCCUPATIONAL THERAPIST

## 2021-12-21 PROCEDURE — 97140 MANUAL THERAPY 1/> REGIONS: CPT | Performed by: OCCUPATIONAL THERAPIST

## 2021-12-21 NOTE — PROGRESS NOTES
Zachary Query  []? Neuromuscular Re-Ed            []? ADL/IADL re-training    [x]?  Therapeutic Activity                  [x]? Pain Management with/without modalities PRN                 [x]?  Manual Therapy                      [x]? Splinting                                   [x]? Scar Management                   []?Joint Protection/Training  []? Ergonomics                             []? Joint Mobilization                      []? Adaptive Equipment Assessment/Training                             [x]?  Manual Edema Mobilization   []? Myofascial Release                 []? Energy Conservation/Work Simplification  [x]? GM/FM Coordination                []? Safety retraining/education per  individual diagnosis/goals  [x]? Desensitization        Patient Specific Goal: \" I need to heal so I can get back to normal\"                             GOALS (Long term same as Short term):  1) Patient will demonstrate good understanding of home program (exercises/activities/diagnosis/prognosis/goals) with good accuracy. 2) Patient will demonstrate increased active/passive range of motion of their left hand/ wrist to [facility] for ADL/IADL completion. 3) Patient will demonstrate /pinch strength of at least 30 / 5 pinch pounds of their left hand. 4) Patient to report decreased pain in their affected left distal upper extremity from 3-10/10 with light use to 3/10 or less with resistive functional use. 5) Increase in fine motor function as evidenced by <30 sec time to complete 9-hole peg test.  6) Patient to demonstrate decreased guarding of their affected extremity from >75% to 20% or less. 7) Patient will demonstrate a non-tender/non-adherent scar. 8) Patient will report ADL/ IADL  functions as I using the left wrist/ hand. Riddhi Hernandez    9) Patient will decrease QuickDASH score to 30% or less for increased participation in daily functional activities.         Pain Level: 4-5,sharp and uncomfortable in the L  thumb    Subjective: \" I wrapped presents all last night and it was really bad when I went to bed . It was a 9/10 and swollen\". Objective:  Updated POC to be completed by 1-15-22. INTERVENTION: COMPLETED: SPECIFICS/COMMENTS:   Modality:     Fluidotherapy L x 10 min/ low heat in conjunction with wrist/ thumb AROM   US L thumb/ wrist   5 min, 3.3 MHz, 50% with attention to the web space and thumb/ radial wrist- tolerated well   AROM:     Wrist/ Hand  AROM X    x - tenodesis as tolerated  - grasp release with cones to increase thumb ABD  - Jux-a-ciser with compensation as needed- 4x   Thumb AROM X  X  X  X  x - blocking ex at IP and MP  - AROM all planes with brief hold on end range  - exercise balls- challenging  - alternating prehension with pom poms  - in hand manipulation and placed with small pom poms     Thumb AAROM x - gentle assist with thumb radial extension        PROM/Stretching:               Scar Mass/Edema Control:     Scar massage x - less numbness on incisions    Soft tissue mob x - focus on the thenar muscles and wrist   Strengthening:               Other:     HEP x Scar massage, blocking ex IP/MP, CMC AROM all planes, wrist tenodesis as tolerated. Splinting- medium L comfort cool brace x Pt provided with a L medium comfort cool support as a transition from her hard thumb spica splint. Pt is to use it as needed. Assessment/Comments: Pt is making Fair + progress toward stated plan of care Tx continued with L wrist/ hand AROM as tolerated. All movement is improving with the exception of radial thumb extension which is tight and sore. Pt is happy with her progress.  Will continue.      -Rehab Potential: Good  -Requires OT Follow Up: Yes  Time In:0730            Time Out: 0815             Visit #: 3    Treatment Charges: Mins Units   Modalities: FL/ US 10 1   Ther Exercise 5 0   Manual Therapy 10 1   Thera Activities 20 1   ADL/Home Mgt      Neuro Re-education Group Therapy     Non-Billable Service Time     Other     Total Time/Units 45 3       -Response to Treatment: Pt feels her hand is getting much better. She remains reliant on her thumb brace. Will continue to wean with light tasks. Goals: Goals for pt can be seen on initial eval occurring on 12-15-21    Plan:   [x]  Continue Plan of care: Continue AROM ex. Wean from brace for light tasks as tolerated. Pt education continues at each visit to obtain maximum benefits from skilled OT intervention.   []  Alter Plan of care:   []  Discharge:      Simona Diaz OT /L  [id number]

## 2021-12-23 ENCOUNTER — TREATMENT (OUTPATIENT)
Dept: OCCUPATIONAL THERAPY | Age: 56
End: 2021-12-23
Payer: COMMERCIAL

## 2021-12-23 DIAGNOSIS — M18.12 PRIMARY OSTEOARTHRITIS OF FIRST CARPOMETACARPAL JOINT OF LEFT HAND: Primary | ICD-10-CM

## 2021-12-23 PROCEDURE — 97530 THERAPEUTIC ACTIVITIES: CPT | Performed by: OCCUPATIONAL THERAPIST

## 2021-12-23 PROCEDURE — 97022 WHIRLPOOL THERAPY: CPT | Performed by: OCCUPATIONAL THERAPIST

## 2021-12-23 PROCEDURE — 97140 MANUAL THERAPY 1/> REGIONS: CPT | Performed by: OCCUPATIONAL THERAPIST

## 2021-12-23 PROCEDURE — 97110 THERAPEUTIC EXERCISES: CPT | Performed by: OCCUPATIONAL THERAPIST

## 2021-12-23 NOTE — PROGRESS NOTES
[place] [facility] [address] [facility] [address]  Dept: [phone] OT Fax: [phone]    OCCUPATIONAL THERAPY PROGRESS NOTE    Date:  2021  Initial Evaluation Date: 12-15-21    Patient Name:  Armando Walter    :  1965    Restrictions/Precautions:  Left hand ROM as tolerated, wean from brace, modalities prn, no strengthening till 8 weeks , Low fall risk  Diagnosis:  M18.12 (ICD-10-CM) - Arthritis of carpometacarpal [place]) joint of left thumb                                                   Date of Surgery/Injury:      Insurance/Certification information:  [facility] Choice  Plan of care signed (Y/N): Y (thru 3-15-22)  Visit# / total visits:      Referring Practitioner:  Dr Brittney Jhaveri  Specific Practitioner Orders: OT evaluate and treat: per last office note : Patient referred to therapy. Okay to wean out of splint. Start early ROM exercises. Strengthening at 6-8 weeks post op.     Assessment of current deficits   []? Functional mobility             [x]? ADLs          [x]? Strength                  []? Cognition   []? Functional transfers           [x]? IADLs         []? Safety Awareness  []? Endurance   [x]? Fine Motor Coordination    []? Balance      []? Vision/perception   [x]? Sensation     []? Gross Motor Coordination [x]? ROM           [x]? Pain                        [x]? Edema          [x]? Scar Adhesion/Skin Integrity      OT PLAN OF CARE   OT POC based on physician orders, patient diagnosis and results of clinical assessment     Frequency/Duration: 2x/ week x 6 weeks  Specific OT Treatment to include:      [x]? Instruction in HEP                   Modalities:  [x]?  Therapeutic Exercise                 [x]? Ultrasound               []? Electrical Stimulation/Attended  [x]? PROM/Stretching                    [x]? Fluidotherapy          [x]?   Paraffin                   [x]? AAROM  [x]?  AROM                 []? Iontophoresis:   []? Tendon Oksana Espinal  []? Neuromuscular Re-Ed            []? ADL/IADL re-training    [x]?  Therapeutic Activity                  [x]? Pain Management with/without modalities PRN                 [x]?  Manual Therapy                      [x]? Splinting                                   [x]? Scar Management                   []?Joint Protection/Training  []? Ergonomics                             []? Joint Mobilization                      []? Adaptive Equipment Assessment/Training                             [x]?  Manual Edema Mobilization   []? Myofascial Release                 []? Energy Conservation/Work Simplification  [x]? GM/FM Coordination                []? Safety retraining/education per  individual diagnosis/goals  [x]? Desensitization        Patient Specific Goal: \" I need to heal so I can get back to normal\"                             GOALS (Long term same as Short term):  1) Patient will demonstrate good understanding of home program (exercises/activities/diagnosis/prognosis/goals) with good accuracy. 2) Patient will demonstrate increased active/passive range of motion of their left hand/ wrist to [facility] for ADL/IADL completion. 3) Patient will demonstrate /pinch strength of at least 30 / 5 pinch pounds of their left hand. 4) Patient to report decreased pain in their affected left distal upper extremity from 3-10/10 with light use to 3/10 or less with resistive functional use. 5) Increase in fine motor function as evidenced by <30 sec time to complete 9-hole peg test.  6) Patient to demonstrate decreased guarding of their affected extremity from >75% to 20% or less. 7) Patient will demonstrate a non-tender/non-adherent scar. 8) Patient will report ADL/ IADL  functions as I using the left wrist/ hand. Alison Rivas    9) Patient will decrease QuickDASH score to 30% or less for increased participation in daily functional activities.         Pain Level: 1/10 stiff in the L thumb    Subjective: \" I made a lot of meatballs. It took me a long time but I did it\". Objective:  Updated POC to be completed by 1-15-22. INTERVENTION: COMPLETED: SPECIFICS/COMMENTS:   Modality:     Fluidotherapy L x 10 min/ low heat in conjunction with wrist/ thumb AROM   US L thumb/ wrist   5 min, 3.3 MHz, 50% with attention to the web space and thumb/ radial wrist- tolerated well   AROM:     Wrist/ Hand  AROM/ AAROM X    x - tenodesis as tolerated/ with gentle assist on wrist extension  - grasp release with cones to increase thumb ABD  - Jux-a-ciser with compensation as needed- ^6x   Thumb AROM/ AAROM X  X  X    x - blocking ex at IP and MP  - AROM all planes with gentle assist as needed/ adding circumduction  - exercise balls- 20x each direction/ improving  - alternating prehension with pom poms  - in hand manipulation and placed with small pom poms and coins      x - gentle assist with thumb radial extension and palmar ABD        PROM/Stretching:     Gentle PROM wrist x - completed as tolerated        Scar Mass/Edema Control:     Scar massage x - less numbness on incisions    Soft tissue mob x - focus on the thenar muscles and wrist   Strengthening:               Other:     HEP x Scar massage, blocking ex IP/MP, CMC AROM all planes, wrist tenodesis as tolerated, thumb circumduction, AAROM wrist/ thumb as tolerated   Splinting- medium L comfort cool brace x Pt provided with a L medium comfort cool support as a transition from her hard thumb spica splint. Pt is to use it as needed. Assessment/Comments: Pt is making Good- progress toward stated plan of care. Pt will be 6 weeks post op tomorrow. Tx continued with attention to AROM/ AAROM and activities to improve thumb dexterity. Tolerance for movement continues to get bettter. Pt is weaning from her brace as tolerated for light tasks. Will continue to progress as tolerated.       -Rehab Potential: Good  -Requires OT Follow Up: Yes  Time In:0730 Time Out: 0825          Visit #: 4    Treatment Charges: Mins Units   Modalities: FL/ US 10/5 1   Ther Exercise 20 1   Manual Therapy 10 1   Thera Activities 10 1   ADL/Home Mgt      Neuro Re-education     Group Therapy     Non-Billable Service Time     Other     Total Time/Units 55 4       -Response to Treatment: Pt feels her hand is getting much better. She is happy her preoperative thumb pains are resolving. Goals: Goals for pt can be seen on initial eval occurring on 12-15-21    Plan:   [x]  Continue Plan of care: Continue AROM / AAROM ex and FM. Wean from brace for light tasks as tolerated. Advance into light strengthening when able to tolerate. Pt education continues at each visit to obtain maximum benefits from skilled OT intervention.   []  Alter Plan of care:   []  Discharge:      Shin Weiss OT /L  [id number]

## 2021-12-28 ENCOUNTER — TREATMENT (OUTPATIENT)
Dept: OCCUPATIONAL THERAPY | Age: 56
End: 2021-12-28
Payer: COMMERCIAL

## 2021-12-28 DIAGNOSIS — M18.12 PRIMARY OSTEOARTHRITIS OF FIRST CARPOMETACARPAL JOINT OF LEFT HAND: Primary | ICD-10-CM

## 2021-12-28 PROCEDURE — 97022 WHIRLPOOL THERAPY: CPT | Performed by: OCCUPATIONAL THERAPIST

## 2021-12-28 PROCEDURE — 97530 THERAPEUTIC ACTIVITIES: CPT | Performed by: OCCUPATIONAL THERAPIST

## 2021-12-28 PROCEDURE — 97140 MANUAL THERAPY 1/> REGIONS: CPT | Performed by: OCCUPATIONAL THERAPIST

## 2021-12-28 PROCEDURE — 97110 THERAPEUTIC EXERCISES: CPT | Performed by: OCCUPATIONAL THERAPIST

## 2021-12-28 NOTE — PROGRESS NOTES
[place] [facility] [address] [facility] [address]  Dept: [address] OT Fax: [phone]    OCCUPATIONAL THERAPY PROGRESS NOTE    Date:  2021  Initial Evaluation Date: 12-15-21    Patient Name:  Wayne Lomax    :  1965    Restrictions/Precautions:  Left hand ROM as tolerated, wean from brace, modalities prn, no strengthening till 8 weeks , Low fall risk  Diagnosis:  M18.12 (ICD-10-CM) - Arthritis of carpometacarpal (CMC) joint of left thumb                                                   Date of Surgery/Injury: 21  ( 6 weeks post op)     Insurance/Certification information:  [facility] Choice  Plan of care signed (Y/N): Y (thru 3-15-22)  Visit# / total visits:      Referring Practitioner:  Dr Tierra Muñiz  Specific Practitioner Orders: OT evaluate and treat: per last office note : Patient referred to therapy. Okay to wean out of splint. Start early ROM exercises. Strengthening at 6-8 weeks post op.     Assessment of current deficits   []? Functional mobility             [x]? ADLs          [x]? Strength                  []? Cognition   []? Functional transfers           [x]? IADLs         []? Safety Awareness  []? Endurance   [x]? Fine Motor Coordination    []? Balance      []? Vision/perception   [x]? Sensation     []? Gross Motor Coordination [x]? ROM           [x]? Pain                        [x]? Edema          [x]? Scar Adhesion/Skin Integrity      OT PLAN OF CARE   OT POC based on physician orders, patient diagnosis and results of clinical assessment     Frequency/Duration: 2x/ week x 6 weeks  Specific OT Treatment to include:      [x]? Instruction in HEP                   Modalities:  [x]?  Therapeutic Exercise                 [x]? Ultrasound               []? Electrical Stimulation/Attended  [x]? PROM/Stretching                    [x]? Fluidotherapy          [x]?   Paraffin                   [x]? AAROM  [x]?  AROM   []? Iontophoresis:   []? Tendon María Walter  []? Neuromuscular Re-Ed            []? ADL/IADL re-training    [x]?  Therapeutic Activity                  [x]? Pain Management with/without modalities PRN                 [x]?  Manual Therapy                      [x]? Splinting                                   [x]? Scar Management                   []?Joint Protection/Training  []? Ergonomics                             []? Joint Mobilization                      []? Adaptive Equipment Assessment/Training                             [x]?  Manual Edema Mobilization   []? Myofascial Release                 []? Energy Conservation/Work Simplification  [x]? GM/FM Coordination                []? Safety retraining/education per  individual diagnosis/goals  [x]? Desensitization        Patient Specific Goal: \" I need to heal so I can get back to normal\"                             GOALS (Long term same as Short term):  1) Patient will demonstrate good understanding of home program (exercises/activities/diagnosis/prognosis/goals) with good accuracy. 2) Patient will demonstrate increased active/passive range of motion of their left hand/ wrist to [facility] for ADL/IADL completion. 3) Patient will demonstrate /pinch strength of at least 30 / 5 pinch pounds of their left hand. 4) Patient to report decreased pain in their affected left distal upper extremity from 3-10/10 with light use to 3/10 or less with resistive functional use. 5) Increase in fine motor function as evidenced by <30 sec time to complete 9-hole peg test.  6) Patient to demonstrate decreased guarding of their affected extremity from >75% to 20% or less. 7) Patient will demonstrate a non-tender/non-adherent scar. 8) Patient will report ADL/ IADL  functions as I using the left wrist/ hand. Vick Hensley 9) Patient will decrease QuickDASH score to 30% or less for increased participation in daily functional activities.        Pain Level:   1/10 stiff in the L  thumb    Subjective: \" I can lay my L hand flatter on the table now. \"   Objective:  Updated POC to be completed by 1-15-22. INTERVENTION: COMPLETED: SPECIFICS/COMMENTS:   Modality:     Fluidotherapy L x 10 min/ low heat in conjunction with wrist/ thumb AROM   US L thumb/ wrist   5 min, 3.3 MHz, 50% with attention to the web space and thumb/ radial wrist- tolerated well   AROM:     Wrist/ Hand  AROM/ AAROM X    x - tenodesis as tolerated/ with gentle assist on wrist extension  - grasp release with cones to increase thumb ABD  - Jux-a-ciser with less compensation -  ^ to 8 rep's    Thumb AROM/ AAROM X  X    x    x - blocking ex at IP and MP  - AROM all planes with gentle assist as needed/ adding circumduction  - exercise balls- 20x each direction/ improving  - alternating prehension with pom poms  - in hand manipulation and placed with small pom poms and coins  - cones grasp and release with 5 sec stretch hold to stretch holcomb abduction     x -thumb radial and palmar abduction        PROM/Stretching:     Gentle PROM wrist x - completed as tolerated        Scar Mass/Edema Control:     Scar massage X  x - less numbness on incisions  -gel sleeve for thumb to wear at night or 3-4 hours as tolerated. X large. Soft tissue mob x - focus on the thenar muscles and wrist. Massage to adductor muscles of thumb   Strengthening:               Other:     HEP x Scar massage, blocking ex IP/MP, CMC AROM all planes, wrist tenodesis as tolerated, thumb circumduction, AAROM wrist/ thumb as tolerated   Splinting- medium L comfort cool brace  Pt provided with a L medium comfort cool support as a transition from her hard thumb spica splint. Pt is to use it as needed. Assessment/Comments: Pt is making Good- progress toward stated plan of care. Tx continued with attention to AROM/ AAROM and activities to improve thumb dexterity. Tolerance for movement continues to get bettter.   Pt is weaning from her brace as tolerated for light tasks. Will continue to progress as tolerated. -Rehab Potential: Good  -Requires OT Follow Up: Yes  Time In:0730            Time Out: 0825          Visit #: 5    Treatment Charges: Mins Units   Modalities: FL/ US 10/0 1   Ther Exercise 20 1   Manual Therapy 15 1   Thera Activities 10 1   ADL/Home Mgt      Neuro Re-education     Group Therapy     Non-Billable Service Time     Other     Total Time/Units 55 4       -Response to Treatment: Pt feels her hand is getting much better. She is happy her preoperative thumb pains are resolving. Goals: Goals for pt can be seen on initial eval occurring on 12-15-21    Plan:   [x]  Continue Plan of care: Continue AROM / AAROM ex and FM. Wean from brace for light tasks as tolerated. Advance into light strengthening when able to tolerate. Pt education continues at each visit to obtain maximum benefits from skilled OT intervention.   []  Alter Plan of care:   []  Discharge:      Stephanie Sheikh, OT /L,  CHT

## 2021-12-30 ENCOUNTER — TREATMENT (OUTPATIENT)
Dept: OCCUPATIONAL THERAPY | Age: 56
End: 2021-12-30
Payer: COMMERCIAL

## 2021-12-30 DIAGNOSIS — M18.12 PRIMARY OSTEOARTHRITIS OF FIRST CARPOMETACARPAL JOINT OF LEFT HAND: Primary | ICD-10-CM

## 2021-12-30 PROCEDURE — 97022 WHIRLPOOL THERAPY: CPT | Performed by: OCCUPATIONAL THERAPIST

## 2021-12-30 PROCEDURE — 97140 MANUAL THERAPY 1/> REGIONS: CPT | Performed by: OCCUPATIONAL THERAPIST

## 2021-12-30 PROCEDURE — 97110 THERAPEUTIC EXERCISES: CPT | Performed by: OCCUPATIONAL THERAPIST

## 2021-12-30 PROCEDURE — 97530 THERAPEUTIC ACTIVITIES: CPT | Performed by: OCCUPATIONAL THERAPIST

## 2021-12-30 NOTE — PROGRESS NOTES
[facility] [address] [facility] [address]  Dept: [address] OT Fax: [phone]    OCCUPATIONAL THERAPY PROGRESS NOTE    Date:  2021  Initial Evaluation Date: 12-15-21    Patient Name:  Pinkie Lesches    :  1965    Restrictions/Precautions:  Left hand ROM as tolerated, wean from brace, modalities prn, no strengthening till 8 weeks , Low fall risk  Diagnosis:  M18.12 (ICD-10-CM) - Arthritis of carpometacarpal (CMC) joint of left thumb                                                   Date of Surgery/Injury: 21  ( 6 weeks post op)     Insurance/Certification information:  [facility] Choice  Plan of care signed (Y/N): Y (thru 3-15-22)  Visit# / total visits:      Referring Practitioner:  Dr Ghada Hernandez  Specific Practitioner Orders: OT evaluate and treat: per last office note : Patient referred to therapy. Okay to wean out of splint. Start early ROM exercises. Strengthening at 6-8 weeks post op.     Assessment of current deficits   []? Functional mobility             [x]? ADLs          [x]? Strength                  []? Cognition   []? Functional transfers           [x]? IADLs         []? Safety Awareness  []? Endurance   [x]? Fine Motor Coordination    []? Balance      []? Vision/perception   [x]? Sensation     []? Gross Motor Coordination [x]? ROM           [x]? Pain                        [x]? Edema          [x]? Scar Adhesion/Skin Integrity      OT PLAN OF CARE   OT POC based on physician orders, patient diagnosis and results of clinical assessment     Frequency/Duration: 2x/ week x 6 weeks  Specific OT Treatment to include:      [x]? Instruction in HEP                   Modalities:  [x]?  Therapeutic Exercise                 [x]? Ultrasound               []? Electrical Stimulation/Attended  [x]? PROM/Stretching                    [x]? Fluidotherapy          [x]?   Paraffin                   [x]? AAROM  [x]?  AROM   []? Iontophoresis:   []? Tendon Elenora Life  []? Neuromuscular Re-Ed            []? ADL/IADL re-training    [x]?  Therapeutic Activity                  [x]? Pain Management with/without modalities PRN                 [x]?  Manual Therapy                      [x]? Splinting                                   [x]? Scar Management                   []?Joint Protection/Training  []? Ergonomics                             []? Joint Mobilization                      []? Adaptive Equipment Assessment/Training                             [x]?  Manual Edema Mobilization   []? Myofascial Release                 []? Energy Conservation/Work Simplification  [x]? GM/FM Coordination                []? Safety retraining/education per  individual diagnosis/goals  [x]? Desensitization        Patient Specific Goal: \" I need to heal so I can get back to normal\"                             GOALS (Long term same as Short term):  1) Patient will demonstrate good understanding of home program (exercises/activities/diagnosis/prognosis/goals) with good accuracy. 2) Patient will demonstrate increased active/passive range of motion of their left hand/ wrist to [facility] for ADL/IADL completion. 3) Patient will demonstrate /pinch strength of at least 30 / 5 pinch pounds of their left hand. 4) Patient to report decreased pain in their affected left distal upper extremity from 3-10/10 with light use to 3/10 or less with resistive functional use. 5) Increase in fine motor function as evidenced by <30 sec time to complete 9-hole peg test.  6) Patient to demonstrate decreased guarding of their affected extremity from >75% to 20% or less. 7) Patient will demonstrate a non-tender/non-adherent scar. 8) Patient will report ADL/ IADL  functions as I using the left wrist/ hand. Syeda Mary 9) Patient will decrease QuickDASH score to 30% or less for increased participation in daily functional activities.        Pain Level:   1/10 stiff in the L  thumb    Subjective: \" I'm so glad my L hand is not hurting - I was excepting it to post op! . \"   Objective:  Updated POC to be completed by 1-15-22. INTERVENTION: COMPLETED: SPECIFICS/COMMENTS:   Modality:     Fluidotherapy L x 10 min/ low heat in conjunction with wrist/ thumb AROM   US L thumb/ wrist   5 min, 3.3 MHz, 50% with attention to the web space and thumb/ radial wrist- tolerated well   AROM:     Wrist/ Hand  AROM/ AAROM X    X  x - tenodesis as tolerated/ with gentle assist on wrist extension  - grasp release with cones to increase thumb ABD  - Jux-a-ciser with less compensation -  ^ to 8 rep's   - table top ball roll- wrist flex stretch 5 sec - wrist ext 10 sec- 10 rep's    Thumb AROM/ AAROM X  X    x          X  x - blocking ex at IP and MP  - AROM all planes with gentle assist as needed/ adding circumduction  - exercise balls- 20x each direction/ improving  -  prehension with pom poms to Sf only - overextend upon release.  - in hand manipulation and placed with small pom poms and coins  - cones grasp and release with 5 sec stretch hold to stretch holcomb abduction   - alternately opposition with over release - buttons. - red sponge -place by SF MP -working thumb MP flexion keeping IP straight - 15 rep's      x -thumb radial and palmar abduction        PROM/Stretching:     Gentle PROM wrist x - completed as tolerated   Thumb all planes x circumduction and all joints    Scar Mass/Edema Control:     Scar massage X  x - less numbness on incisions  -gel sleeve for thumb- able to  to wear at night  X large.      Soft tissue mob x - focus on the thenar muscles and wrist. Massage to adductor muscles of thumb   Strengthening:               Other:     HEP x Scar massage, blocking ex IP/MP, CMC AROM all planes, wrist tenodesis as tolerated, thumb circumduction, AAROM wrist/ thumb as tolerated   Splinting- medium L comfort cool brace  Pt provided with a L medium comfort cool support as a transition from her hard thumb spica splint. Pt is to use it as needed. Assessment/Comments: Pt is making Good- progress toward stated plan of care. Tx continued with attention to AROM/ AAROM and activities to improve thumb dexterity. Tolerance for movement continues to get better and pt feels she if getting more joint mobility at her wrist and thumb. Will continue to progress as tolerated. -Rehab Potential: Good  -Requires OT Follow Up: Yes  Time In:   0735            Time Out: 0825          Visit #: 6    Treatment Charges: Mins Units   Modalities: FL/ US 10/0 1   Ther Exercise 20 1   Manual Therapy 15 1   Thera Activities 10 1   ADL/Home Mgt      Neuro Re-education     Group Therapy     Non-Billable Service Time     Other     Total Time/Units 55 4       -Response to Treatment: Pt feels her hand is getting much better. She is happy her preoperative thumb pains are resolving. Goals: Goals for pt can be seen on initial eval occurring on 12-15-21    Plan:   [x]  Continue Plan of care: Continue AROM / AAROM ex and FM. Wean from brace for light tasks as tolerated. Advance into light strengthening when able to tolerate. Pt education continues at each visit to obtain maximum benefits from skilled OT intervention.   []  Alter Plan of care:   []  Discharge:      Columba Vanegas, OT /L,  CHT

## 2022-01-04 ENCOUNTER — TREATMENT (OUTPATIENT)
Dept: OCCUPATIONAL THERAPY | Age: 57
End: 2022-01-04
Payer: COMMERCIAL

## 2022-01-04 DIAGNOSIS — M18.12 PRIMARY OSTEOARTHRITIS OF FIRST CARPOMETACARPAL JOINT OF LEFT HAND: Primary | ICD-10-CM

## 2022-01-04 DIAGNOSIS — S66.819A: ICD-10-CM

## 2022-01-04 DIAGNOSIS — S66.812A RUPTURE OF EXTENSOR TENDONS OF LEFT HAND AND WRIST, INITIAL ENCOUNTER: ICD-10-CM

## 2022-01-04 PROCEDURE — 97140 MANUAL THERAPY 1/> REGIONS: CPT | Performed by: OCCUPATIONAL THERAPIST

## 2022-01-04 PROCEDURE — 97530 THERAPEUTIC ACTIVITIES: CPT | Performed by: OCCUPATIONAL THERAPIST

## 2022-01-04 PROCEDURE — 97110 THERAPEUTIC EXERCISES: CPT | Performed by: OCCUPATIONAL THERAPIST

## 2022-01-04 PROCEDURE — 97022 WHIRLPOOL THERAPY: CPT | Performed by: OCCUPATIONAL THERAPIST

## 2022-01-04 NOTE — PROGRESS NOTES
[facility] [address] [facility] [address]  Dept: [address] OT Fax: [phone]    OCCUPATIONAL THERAPY PROGRESS NOTE    Date:  2022  Initial Evaluation Date: 12-15-21    Patient Name:  Karma Arora    :  1965    Restrictions/Precautions:  Left hand ROM as tolerated, wean from brace, modalities prn, no strengthening till 8 weeks , Low fall risk  Diagnosis:  M18.12 (ICD-10-CM) - Arthritis of carpometacarpal (CMC) joint of left thumb                                                   Date of Surgery/Injury: 21  ( 8 weeks post op)     Insurance/Certification information:  [facility] Choice  Plan of care signed (Y/N): Y (thru 3-15-22)  Visit# / total visits:      Referring Practitioner:  Dr Yosi Strauss  Specific Practitioner Orders: OT evaluate and treat: per last office note : Patient referred to therapy. Okay to wean out of splint. Start early ROM exercises. Strengthening at 6-8 weeks post op.     Assessment of current deficits   []? Functional mobility             [x]? ADLs          [x]? Strength                  []? Cognition   []? Functional transfers           [x]? IADLs         []? Safety Awareness  []? Endurance   [x]? Fine Motor Coordination    []? Balance      []? Vision/perception   [x]? Sensation     []? Gross Motor Coordination [x]? ROM           [x]? Pain                        [x]? Edema          [x]? Scar Adhesion/Skin Integrity      OT PLAN OF CARE   OT POC based on physician orders, patient diagnosis and results of clinical assessment     Frequency/Duration: 2x/ week x 6 weeks  Specific OT Treatment to include:      [x]? Instruction in HEP                   Modalities:  [x]?  Therapeutic Exercise                 [x]? Ultrasound               []? Electrical Stimulation/Attended  [x]? PROM/Stretching                    [x]? Fluidotherapy          [x]?   Paraffin                   [x]? AAROM  [x]?  AROM   []? Iontophoresis:   []? Tendon [place] Serg  []? Neuromuscular Re-Ed            []? ADL/IADL re-training    [x]?  Therapeutic Activity                  [x]? Pain Management with/without modalities PRN                 [x]?  Manual Therapy                      [x]? Splinting                                   [x]? Scar Management                   []?Joint Protection/Training  []? Ergonomics                             []? Joint Mobilization                      []? Adaptive Equipment Assessment/Training                             [x]?  Manual Edema Mobilization   []? Myofascial Release                 []? Energy Conservation/Work Simplification  [x]? GM/FM Coordination                []? Safety retraining/education per  individual diagnosis/goals  [x]? Desensitization        Patient Specific Goal: \" I need to heal so I can get back to normal\"                             GOALS (Long term same as Short term):  1) Patient will demonstrate good understanding of home program (exercises/activities/diagnosis/prognosis/goals) with good accuracy. 2) Patient will demonstrate increased active/passive range of motion of their left hand/ wrist to [facility] for ADL/IADL completion. 3) Patient will demonstrate /pinch strength of at least 30 / 5 pinch pounds of their left hand. 4) Patient to report decreased pain in their affected left distal upper extremity from 3-10/10 with light use to 3/10 or less with resistive functional use. 5) Increase in fine motor function as evidenced by <30 sec time to complete 9-hole peg test.  6) Patient to demonstrate decreased guarding of their affected extremity from >75% to 20% or less. 7) Patient will demonstrate a non-tender/non-adherent scar. 8) Patient will report ADL/ IADL  functions as I using the left wrist/ hand. Jony Espinoza 9) Patient will decrease QuickDASH score to 30% or less for increased participation in daily functional activities.        Pain Level:   1/10 stiff in the L  thumb    Subjective: \"I have this weird pain at the tip thumb\"   Objective:  Updated POC to be completed by 1-15-22. INTERVENTION: COMPLETED: SPECIFICS/COMMENTS:   Modality:     Fluidotherapy L x 10 min/ low heat in conjunction with wrist/ thumb AROM   US L thumb/ wrist   5 min, 3.3 MHz, 50% with attention to the web space and thumb/ radial wrist- tolerated well   AROM:     Wrist/ Hand  AROM/ AAROM X    X   - tenodesis as tolerated/ with gentle assist on wrist extension  - grasp release with cones to increase thumb ABD  - Jux-a-ciser with less compensation -  ^ to 8 rep's   - table top ball roll- wrist flex stretch 5 sec - wrist ext 10 sec- 10 rep's    Thumb AROM/ AAROM X  X    x          X   - blocking ex at IP and MP  - AROM all planes with gentle assist as needed/ adding circumduction  - exercise balls- 20x each direction/ improving  -  prehension with pom poms to Sf only - overextend upon release.  - in hand manipulation and placed with small pom poms and coins  - cones grasp and release with 5 sec stretch hold to stretch holcomb abduction   - alternately opposition with over release - buttons. - red sponge -place by SF MP -working thumb MP flexion keeping IP straight - 15 rep's      x -thumb radial and palmar abduction        PROM/Stretching:     Gentle PROM wrist x - completed as tolerated   Thumb all planes x circumduction and all joints    Scar Mass/Edema Control:     Scar massage X  x - less numbness on incisions  -gel sleeve for thumb- able to  to wear at night  X large.      Soft tissue mob x - focus on the thenar muscles and wrist. Massage to adductor muscles of thumb   Strengthening:               Other:     HEP x Scar massage, blocking ex IP/MP, CMC AROM all planes, wrist tenodesis as tolerated, thumb circumduction, AAROM wrist/ thumb as tolerated   Splinting- medium L comfort cool brace  Pt provided with a L medium comfort cool support as a transition from her hard thumb spica splint. Pt is to use it as needed. Assessment/Comments: Pt is making Good- progress toward stated plan of care. Pt tolerated session well, pain continues at the IP joint of the thumb with in hand manipulation tasks. Pt continues to demonstrate progress with endurance of fine motor tasks as well as decreased compensation of the wrist while using the thumb for functional tasks. Will continue to progress as tolerated. -Rehab Potential: Good  -Requires OT Follow Up: Yes  Time In:   0735            Time Out: 0825          Visit #: 7    Treatment Charges: Mins Units   Modalities: FL/ US 10/0 1   Ther Exercise 20 1   Manual Therapy 15 1   Thera Activities 10 1   ADL/Home Mgt      Neuro Re-education     Group Therapy     Non-Billable Service Time     Other     Total Time/Units 55 4       -Response to Treatment: Pt feels her hand is getting much better. She is happy her preoperative thumb pains are resolving. Goals: Goals for pt can be seen on initial eval occurring on 12-15-21    Plan:   [x]  Continue Plan of care: Continue AROM / AAROM ex and FM. Wean from brace for light tasks as tolerated. Advance into light strengthening when able to tolerate. Pt education continues at each visit to obtain maximum benefits from skilled OT intervention. []  Alter Plan of care:   []  Discharge:       [address] [facility] R/L #[id number]

## 2022-01-06 ENCOUNTER — TREATMENT (OUTPATIENT)
Dept: OCCUPATIONAL THERAPY | Age: 57
End: 2022-01-06
Payer: COMMERCIAL

## 2022-01-06 DIAGNOSIS — M18.12 PRIMARY OSTEOARTHRITIS OF FIRST CARPOMETACARPAL JOINT OF LEFT HAND: Primary | ICD-10-CM

## 2022-01-06 PROCEDURE — 97140 MANUAL THERAPY 1/> REGIONS: CPT | Performed by: OCCUPATIONAL THERAPIST

## 2022-01-06 PROCEDURE — 97110 THERAPEUTIC EXERCISES: CPT | Performed by: OCCUPATIONAL THERAPIST

## 2022-01-06 PROCEDURE — 97530 THERAPEUTIC ACTIVITIES: CPT | Performed by: OCCUPATIONAL THERAPIST

## 2022-01-06 PROCEDURE — 97022 WHIRLPOOL THERAPY: CPT | Performed by: OCCUPATIONAL THERAPIST

## 2022-01-06 NOTE — PROGRESS NOTES
[facility] [address] [facility] [address]  Dept: [address] OT Fax: [phone]    OCCUPATIONAL THERAPY PROGRESS NOTE    RE- Assessment    Date:  2022  Initial Evaluation Date: 12-15-21    Patient Name:  Melanie Haider    :  1965    Restrictions/Precautions:  Left hand ROM as tolerated, wean from brace, modalities prn, no strengthening till 8 weeks , Low fall risk  Diagnosis:  M18.12 (ICD-10-CM) - Arthritis of carpometacarpal (CMC) joint of left thumb                                                   Date of Surgery/Injury: 21  ( 8 weeks post op)     Insurance/Certification information:  [facility] Choice  Plan of care signed (Y/N): Y (thru 3-15-22)  Visit# / total visits:  to 16 if needed,      Referring Practitioner:  Dr Gabriela Phillips  Specific Practitioner Orders: OT evaluate and treat: per last office note : Patient referred to therapy. Okay to wean out of splint. Start early ROM exercises. Strengthening at 6-8 weeks post op.     Assessment of current deficits   []? Functional mobility             [x]? ADLs          [x]? Strength                  []? Cognition   []? Functional transfers           [x]? IADLs         []? Safety Awareness  []? Endurance   [x]? Fine Motor Coordination    []? Balance      []? Vision/perception   [x]? Sensation     []? Gross Motor Coordination [x]? ROM           [x]? Pain                        [x]? Edema          [x]? Scar Adhesion/Skin Integrity      OT PLAN OF CARE   OT POC based on physician orders, patient diagnosis and results of clinical assessment     Frequency/Duration: 2x/ week x 6 weeks  Specific OT Treatment to include:      [x]? Instruction in HEP                   Modalities:  [x]?  Therapeutic Exercise                 [x]? Ultrasound               []? Electrical Stimulation/Attended  [x]? PROM/Stretching                    [x]? Fluidotherapy          [x]?   Paraffin                   [x]?  JACQUE  [x]? AROM                 []? Iontophoresis:   []? Tendon Judie Rhiannonon  []? Neuromuscular Re-Ed            []? ADL/IADL re-training    [x]?  Therapeutic Activity                  [x]? Pain Management with/without modalities PRN                 [x]?  Manual Therapy                      [x]? Splinting                                   [x]? Scar Management                   []?Joint Protection/Training  []? Ergonomics                             []? Joint Mobilization                      []? Adaptive Equipment Assessment/Training                             [x]?  Manual Edema Mobilization   []? Myofascial Release                 []? Energy Conservation/Work Simplification  [x]? GM/FM Coordination                []? Safety retraining/education per  individual diagnosis/goals  [x]? Desensitization        Patient Specific Goal: \" I need to heal so I can get back to normal\"                             GOALS (Long term same as Short term):  1) Patient will demonstrate good understanding of home program (exercises/activities/diagnosis/prognosis/goals) with good accuracy. Goal in progress - pt showing good follow thru. 2) Patient will demonstrate increased active/passive range of motion of their left hand/ wrist to [facility] for ADL/IADL completion. Goal making good gains. 3) Patient will demonstrate /pinch strength of at least 30 / 5 pinch pounds of their left hand. Goal initiated this date for gripping only - will progress a tolerated. Deidra Wick 4) Patient to report decreased pain in their affected left distal upper extremity from 3-10/10 with light use to 3/10 or less with resistive functional use. Goal making fair gains. 5) Increase in fine motor function as evidenced by <30 sec time to complete 9-hole peg test.GoaL MET. 6) Patient to demonstrate decreased guarding of their affected extremity from >75% to 20% or less. Goal making good gains. Has decreased to 40%.   7) Patient will demonstrate a non-tender/non-adherent scar. Goal making good gains. 8) Patient will report ADL/ IADL  functions as I using the left wrist/ hand. Goal making fair gains. Tank Hwangr 9) Patient will decrease QuickDASH score to 30% or less for increased participation in daily functional activities. Goal making fair gains.       Pain Level:   0-2/10 stiff in the L  thumb    Subjective: \" My feeling is still not normal in this thumb but it is better then it was. \"   Objective:  Updated POC to be completed by 2/6/2022. INTERVENTION: COMPLETED: SPECIFICS/COMMENTS:   Modality:     Fluidotherapy L x 10 min/ low heat in conjunction with wrist/ thumb AROM   US L thumb/ wrist   5 min, 3.3 MHz, 50% with attention to the web space and thumb/ radial wrist- tolerated well   AROM:     Wrist/ Hand  AROM/ AAROM X      x   - tenodesis as tolerated/ with gentle assist on wrist extension  - grasp release with cones to increase thumb ABD  - Jux-a-ciser with less compensation -  8 rep's   - table top ball roll- wrist flex stretch 5 sec - wrist ext 15 sec pushing further with R hand - 10 rep's    Thumb AROM/ AAROM X  X    x          X   - blocking ex at IP and MP  - AROM all planes with gentle assist as needed/ adding circumduction  - exercise balls- 20x each direction/ improving  -  prehension with pom poms to Sf only - overextend upon release.  - in hand manipulation and placed with small pom poms and coins  - cones grasp and release with 5 sec stretch hold to stretch holcomb abduction   - alternately opposition with over release - pom poms - to RF and SF only.   - red sponge -place by SF MP -working thumb MP flexion keeping IP straight - 15 rep's      x -thumb radial and palmar abduction        PROM/Stretching:     Gentle PROM wrist x - completed as tolerated   Thumb all planes x circumduction and all joints    Scar Mass/Edema Control:     Scar massage X  x - less numbness on incisions  -gel sleeve and or scar bandaide for thumb- able to  to wear at night  X large. Soft tissue mob x - focus on the thenar muscles and wrist. Massage to adductor muscles of thumb   Strengthening:     Hand  x - yellow putty - gentle gripping- fingers only  and manipulation with thumb and fingers 1.5 min - sl ^ in pain so stopped. Added to HEP    Wrist/forearm x 1# wt - wrist ext, wrist flex and RD - UD - 10 rep's each- not full ROM - hyacinth well. Other:     HEP x Scar massage, blocking ex IP/MP, CMC AROM all planes, wrist tenodesis as tolerated, thumb circumduction, added putty this date - see above - no prehension yet. Splinting- medium L comfort cool brace  Pt provided with a L medium comfort cool support as a transition from her hard thumb spica splint. Pt is to use it as needed. Assessment/Comments: Pt is making Good- progress toward stated plan of care. Pt tolerated session well. Therapist began strengthening in L UE this date with slight ^ in pain but no pain be end of session. Added putty to HEP - but no resistive pinching yet. Re- assessment done this date 2* to her Dr's appointment next week. Will continue to progress as tolerated. Re- Assessment:       Pain Level: IE   3-10 on scale of 1-10, burning, sharp, shooting, tight (pulling) and uncomfortable                      1/6/2022-  Pain at rest 0 -1/10. Pain when using for light ADL tasks 0 - 3/10. Pain when trying to use for resistive tasks 3-5/10.  ( pt doing light functional tasks at home - will progress to moderate over the next 4 weeks. )  Left UE AROM: Exceptions to WNL                  12-15-21           1/6 /22     Wrist flexion           0-50                87*      Wrist extension           0-30                56*      RD           0-25                25*      UD           0-6                35*      Thumb IP           0-45                53*      Thumb MP           15-30                30*     Thumb radial abdcution            0-35                 0/47*      Thumb Palmar ABD 25-35                0/ 54*                 QuickDASH Score: 57% disability  To 43% disabilty    Dynamometer (setting 2): assessed this date. Left:   8#      Right: 41#       Pinch Meter:   Lateral: Left=  2.5#,     Right= 11#    Palmar 3 point: Left= 3#,  Right= 10#    9 Hole Peg Test:        IE  -   Left: 15.6 sec out only/ pt not able to place pegs in holes   This date. Left:    :25.68   Right:  :18.30         -Rehab Potential: Good  -Requires OT Follow Up: Yes  Time In:   0735            Time Out: 0830          Visit #: 8    Treatment Charges: Mins Units   Modalities: FL/ US 10/0 1   Ther Exercise 20 1   Manual Therapy 15 1   Thera Activities 10 1   ADL/Home Mgt      Neuro Re-education     Group Therapy     Non-Billable Service Time     Other     Total Time/Units 55 4       -Response to Treatment: Pt feels her hand is getting much better. She is happy her preoperative thumb pains are resolving. Goals: Goals for pt can be seen on initial eval occurring on 12-15-21 and on 1/6/2022    Plan:   [x]  Continue Plan of care: Continue AROM /PROM  ex and strengthening as tolerated. Will treat pt 2 x's a week for 4 - 8 sessions depending on how well she tolerates strengthening to work on the above goals. Pt education continues at each visit to obtain maximum benefits from skilled OT intervention.   []  Alter Plan of care:   []  Discharge:      [place], OT/L, CHT

## 2022-01-11 ENCOUNTER — TELEPHONE (OUTPATIENT)
Dept: ORTHOPEDIC SURGERY | Age: 57
End: 2022-01-11

## 2022-01-11 ENCOUNTER — OFFICE VISIT (OUTPATIENT)
Dept: ORTHOPEDIC SURGERY | Age: 57
End: 2022-01-11

## 2022-01-11 VITALS — WEIGHT: 110 LBS | RESPIRATION RATE: 18 BRPM | HEIGHT: 60 IN | BODY MASS INDEX: 21.6 KG/M2

## 2022-01-11 DIAGNOSIS — M18.12 ARTHRITIS OF CARPOMETACARPAL (CMC) JOINT OF LEFT THUMB: ICD-10-CM

## 2022-01-11 DIAGNOSIS — M18.12 ARTHRITIS OF CARPOMETACARPAL (CMC) JOINT OF LEFT THUMB: Primary | ICD-10-CM

## 2022-01-11 PROCEDURE — 99024 POSTOP FOLLOW-UP VISIT: CPT | Performed by: ORTHOPAEDIC SURGERY

## 2022-01-11 RX ORDER — HYDROCODONE BITARTRATE AND ACETAMINOPHEN 5; 325 MG/1; MG/1
1 TABLET ORAL EVERY 6 HOURS PRN
Qty: 28 TABLET | Refills: 0 | Status: SHIPPED | OUTPATIENT
Start: 2022-01-11 | End: 2022-01-18

## 2022-01-11 NOTE — PROGRESS NOTES
HPI:   Patient presents today just over 8 weeks s/p left thumb trapeziectomy with ligament reconstruction and tendon interposition with MCP joint capsulodesis. Overall the patient is doing well. She states she has some burning of the dorsal thumb still. Overall her pain is improved she does have some pain at night still and takes norco at that time. Physical Exam:  Left thumb incision clean, dry, and intact. Healing well. No signs of infection  ROM appropriate  NVI  Radial, median, and ulnar sensation intact to light touch  Brisk capillary refill  +tinnels over the radial sensory nerve. Muscle strength 5/5 grossly      Assessment:  Post-op 8 week left thumb trapeziectomy with ligament reconstruction and tendon interposition with MCP joint capsulodesis with radial sensory neuritis     Plan:  Patient is to continue with therapy. At this point she is able to start working on her strengthening as well as continue range of motion exercises. Patient requesting lumbar prescription of Norco.  This will be filled and be her last prescription. Continue continue range of motion as well as strengthening exercises at home. She may start to gradually return to normal activities at 3 months postop. At this point patient may follow-up as needed she instructed to call the office if she has any concerns or if she is to schedule an appointment. 1/11/2022  Huseyin Parsons, DO    I have seen and evaluated the patient and agree with the above assessment and plan on today's visit. I have performed the key components of the history and physical examination with significant findings of postop doing well. I concur with the findings and plan as documented.     Shasha Nelson MD  1/11/2022

## 2022-01-12 ENCOUNTER — TREATMENT (OUTPATIENT)
Dept: OCCUPATIONAL THERAPY | Age: 57
End: 2022-01-12
Payer: COMMERCIAL

## 2022-01-12 DIAGNOSIS — M18.12 PRIMARY OSTEOARTHRITIS OF FIRST CARPOMETACARPAL JOINT OF LEFT HAND: Primary | ICD-10-CM

## 2022-01-12 DIAGNOSIS — S66.819A: ICD-10-CM

## 2022-01-12 DIAGNOSIS — S66.812A RUPTURE OF EXTENSOR TENDONS OF LEFT HAND AND WRIST, INITIAL ENCOUNTER: ICD-10-CM

## 2022-01-12 PROCEDURE — 97110 THERAPEUTIC EXERCISES: CPT | Performed by: OCCUPATIONAL THERAPIST

## 2022-01-12 PROCEDURE — 97022 WHIRLPOOL THERAPY: CPT | Performed by: OCCUPATIONAL THERAPIST

## 2022-01-12 PROCEDURE — 97140 MANUAL THERAPY 1/> REGIONS: CPT | Performed by: OCCUPATIONAL THERAPIST

## 2022-01-12 NOTE — PROGRESS NOTES
[facility] [address] [facility] [address]  Dept: [address] OT Fax: [phone]    OCCUPATIONAL THERAPY PROGRESS NOTE    Date:  2022  Initial Evaluation Date: 12-15-21    Patient Name:  Christiano Castanon    :  1965    Restrictions/Precautions:  Left hand ROM as tolerated, wean from brace, modalities prn, no strengthening till 8 weeks , Low fall risk  Diagnosis:  M18.12 (ICD-10-CM) - Arthritis of carpometacarpal (CMC) joint of left thumb                                                   Date of Surgery/Injury: 21  ( 8 weeks post op)     Insurance/Certification information:  [facility]  Plan of care signed (Y/N): Y (thru 3-15-22)  Visit# / total visits:  to 16 if needed,      Referring Practitioner:  Dr Yg Perry  Specific Practitioner Orders: OT evaluate and treat: per last office note : Patient referred to therapy. Okay to wean out of splint. Start early ROM exercises. Strengthening at 6-8 weeks post op.     Assessment of current deficits   []? Functional mobility             [x]? ADLs          [x]? Strength                  []? Cognition   []? Functional transfers           [x]? IADLs         []? Safety Awareness  []? Endurance   [x]? Fine Motor Coordination    []? Balance      []? Vision/perception   [x]? Sensation     []? Gross Motor Coordination [x]? ROM           [x]? Pain                        [x]? Edema          [x]? Scar Adhesion/Skin Integrity      OT PLAN OF CARE   OT POC based on physician orders, patient diagnosis and results of clinical assessment     Frequency/Duration: 2x/ week x 6 weeks  Specific OT Treatment to include:      [x]? Instruction in HEP                   Modalities:  [x]?  Therapeutic Exercise                 [x]? Ultrasound               []? Electrical Stimulation/Attended  [x]? PROM/Stretching                    [x]? Fluidotherapy          [x]?   Paraffin                   [x]? AAROM  [x]?  AROM   []? Iontophoresis:   []? Tendon Lear Gallon  []? Neuromuscular Re-Ed            []? ADL/IADL re-training    [x]?  Therapeutic Activity                  [x]? Pain Management with/without modalities PRN                 [x]?  Manual Therapy                      [x]? Splinting                                   [x]? Scar Management                   []?Joint Protection/Training  []? Ergonomics                             []? Joint Mobilization                      []? Adaptive Equipment Assessment/Training                             [x]?  Manual Edema Mobilization   []? Myofascial Release                 []? Energy Conservation/Work Simplification  [x]? GM/FM Coordination                []? Safety retraining/education per  individual diagnosis/goals  [x]? Desensitization        Patient Specific Goal: \" I need to heal so I can get back to normal\"                             GOALS (Long term same as Short term):  1) Patient will demonstrate good understanding of home program (exercises/activities/diagnosis/prognosis/goals) with good accuracy. Goal in progress - pt showing good follow thru. 2) Patient will demonstrate increased active/passive range of motion of their left hand/ wrist to [facility] for ADL/IADL completion. Goal making good gains. 3) Patient will demonstrate /pinch strength of at least 30 / 5 pinch pounds of their left hand. Goal initiated this date for gripping only - will progress a tolerated. Christen Mcknight 4) Patient to report decreased pain in their affected left distal upper extremity from 3-10/10 with light use to 3/10 or less with resistive functional use. Goal making fair gains. 5) Increase in fine motor function as evidenced by <30 sec time to complete 9-hole peg test.GoaL MET. 6) Patient to demonstrate decreased guarding of their affected extremity from >75% to 20% or less. Goal making good gains. Has decreased to 40%.   7) Patient will demonstrate a non-tender/non-adherent scar. Goal making good gains. 8) Patient will report ADL/ IADL  functions as I using the left wrist/ hand. Goal making fair gains. Raghavendra Saravia 9) Patient will decrease QuickDASH score to 30% or less for increased participation in daily functional activities. Goal making fair gains.       Pain Level:   0-2/10 stiff in the L  thumb    Subjective: \"The doctors appointment went well-we are allowed to strengthen but I am not allowed to do my barn chores\"   Objective:  Updated POC to be completed by 2/6/2022. INTERVENTION: COMPLETED: SPECIFICS/COMMENTS:   Modality:     Fluidotherapy L x 10 min/ low heat in conjunction with wrist/ thumb AROM   US L thumb/ wrist   5 min, 3.3 MHz, 50% with attention to the web space and thumb/ radial wrist- tolerated well   AROM:     Wrist/ Hand  AROM/ AAROM X      x   - tenodesis as tolerated/ with gentle assist on wrist extension  - grasp release with cones to increase thumb ABD  - Jux-a-ciser with less compensation -  8 rep's   - table top ball roll- wrist flex stretch 5 sec - wrist ext 15 sec pushing further with R hand - 10 rep's    Thumb AROM/ AAROM X  X    x          X   - blocking ex at IP and MP  - AROM all planes with gentle assist as needed/ adding circumduction  - exercise balls- 20x each direction/ improving  -  prehension with pom poms to Sf only - overextend upon release.  - in hand manipulation and placed with small pom poms and coins  - cones grasp and release with 5 sec stretch hold to stretch holcomb abduction   - alternately opposition with over release - pom poms - to RF and SF only.   - red sponge -place by SF MP -working thumb MP flexion keeping IP straight - 15 rep's      x -thumb radial and palmar abduction        PROM/Stretching:     Gentle PROM wrist x - completed as tolerated   Thumb all planes x circumduction and all joints    Scar Mass/Edema Control:     Scar massage X  x - less numbness on incisions  -gel sleeve and or scar bandaide for thumb- able to  to wear at night  X large. Soft tissue mob x - focus on the thenar muscles and wrist. Massage to adductor muscles of thumb   Strengthening:     Hand  X    x - yellow putty - gentle gripping- fingers only  and manipulation with thumb and fingers 1.5 min - sl ^ in pain so stopped. Added to HEP   -lateral pinch with light yellow resistive clip picking up 30 beads   Wrist/forearm  1# wt - wrist ext, wrist flex and RD - UD - 10 rep's each- not full ROM - hyacinth well. Other:     HEP x Scar massage, blocking ex IP/MP, CMC AROM all planes, wrist tenodesis as tolerated, thumb circumduction, added putty this date - see above - no prehension yet. Splinting- medium L comfort cool brace  Pt provided with a L medium comfort cool support as a transition from her hard thumb spica splint. Pt is to use it as needed. Assessment/Comments: Pt is making Good- progress toward stated plan of care. Increased strengthening activities this date-tolerated well with minimal increase of pain with the radial aspect of the thumb. Will continue to progress as tolerated. Re- Assessment:       Pain Level: IE   3-10 on scale of 1-10, burning, sharp, shooting, tight (pulling) and uncomfortable                      1/6/2022-  Pain at rest 0 -1/10. Pain when using for light ADL tasks 0 - 3/10. Pain when trying to use for resistive tasks 3-5/10.  ( pt doing light functional tasks at home - will progress to moderate over the next 4 weeks. )  Left UE AROM: Exceptions to WNL                  12-15-21           1/6 /22     Wrist flexion           0-50                87*      Wrist extension           0-30                56*      RD           0-25                25*      UD           0-6                35*      Thumb IP           0-45                53*      Thumb MP           15-30                30*     Thumb radial abdcution            0-35                 0/47*      Thumb Palmar ABD            25-35                0/ 54*               QuickDASH Score: 57% disability  To 43% disabilty    Dynamometer (setting 2): assessed this date. Left:   8#      Right: 41#       Pinch Meter:   Lateral: Left=  2.5#,     Right= 11#    Palmar 3 point: Left= 3#,  Right= 10#    9 Hole Peg Test:        IE  -   Left: 15.6 sec out only/ pt not able to place pegs in holes   This date. Left:    :25.68   Right:  :18.30         -Rehab Potential: Good  -Requires OT Follow Up: Yes  Time In:   0735            Time Out: 0815         Visit #: 9    Treatment Charges: Mins Units   Modalities: FL/ US 10/0 1   Ther Exercise 20 1   Manual Therapy 15 1   Thera Activities     ADL/Home Mgt      Neuro Re-education     Group Therapy     Non-Billable Service Time     Other     Total Time/Units 45 3       -Response to Treatment: Pt feels her hand is getting much better. She is happy her preoperative thumb pains are resolving. Goals: Goals for pt can be seen on initial eval occurring on 12-15-21 and on 1/6/2022    Plan:   [x]  Continue Plan of care: Continue AROM /PROM  ex and strengthening as tolerated. Will treat pt 2 x's a week for 4 - 8 sessions depending on how well she tolerates strengthening to work on the above goals. Pt education continues at each visit to obtain maximum benefits from skilled OT intervention. []  Alter Plan of care:   []  Discharge:       [address] [facility], OTR/L, #[id number]

## 2022-01-14 ENCOUNTER — TREATMENT (OUTPATIENT)
Dept: OCCUPATIONAL THERAPY | Age: 57
End: 2022-01-14
Payer: COMMERCIAL

## 2022-01-14 DIAGNOSIS — M18.12 PRIMARY OSTEOARTHRITIS OF FIRST CARPOMETACARPAL JOINT OF LEFT HAND: Primary | ICD-10-CM

## 2022-01-14 PROCEDURE — 97530 THERAPEUTIC ACTIVITIES: CPT | Performed by: OCCUPATIONAL THERAPIST

## 2022-01-14 PROCEDURE — 97110 THERAPEUTIC EXERCISES: CPT | Performed by: OCCUPATIONAL THERAPIST

## 2022-01-14 PROCEDURE — 97140 MANUAL THERAPY 1/> REGIONS: CPT | Performed by: OCCUPATIONAL THERAPIST

## 2022-01-14 NOTE — PROGRESS NOTES
[place] [address]  [facility] [address]  Dept: [address] OT Fax: [phone]    OCCUPATIONAL THERAPY PROGRESS NOTE    Date:  2022  Initial Evaluation Date: 12-15-21    Patient Name:  Bisi Perdomo    :  1965    Restrictions/Precautions:  Left hand ROM as tolerated, wean from brace, modalities prn, no strengthening till 8 weeks , Low fall risk  Diagnosis:  M18.12 (ICD-10-CM) - Arthritis of carpometacarpal (CMC) joint of left thumb                                                   Date of Surgery/Injury: 21  ( 9 weeks post op)     Insurance/Certification information:  [facility] Choice  Plan of care signed (Y/N): Y (thru 3-15-22)  Visit# / total visits: 10 / 12 to 16 if needed,      Referring Practitioner:  Dr Sherif Ruvalcaba  Specific Practitioner Orders: OT evaluate and treat: per last office note : Patient referred to therapy. Okay to wean out of splint. Start early ROM exercises. Strengthening at 6-8 weeks post op.     Assessment of current deficits   []? Functional mobility             [x]? ADLs          [x]? Strength                  []? Cognition   []? Functional transfers           [x]? IADLs         []? Safety Awareness  []? Endurance   [x]? Fine Motor Coordination    []? Balance      []? Vision/perception   [x]? Sensation     []? Gross Motor Coordination [x]? ROM           [x]? Pain                        [x]? Edema          [x]? Scar Adhesion/Skin Integrity      OT PLAN OF CARE   OT POC based on physician orders, patient diagnosis and results of clinical assessment     Frequency/Duration: 2x/ week x 6 weeks  Specific OT Treatment to include:      [x]? Instruction in HEP                   Modalities:  [x]?  Therapeutic Exercise                 [x]? Ultrasound               []? Electrical Stimulation/Attended  [x]? PROM/Stretching                    [x]? Fluidotherapy          [x]?   Paraffin                   [x]? AAROM  [x]?  AROM   []? Iontophoresis:   []? Tendon Asmita Ny  []? Neuromuscular Re-Ed            []? ADL/IADL re-training    [x]?  Therapeutic Activity                  [x]? Pain Management with/without modalities PRN                 [x]?  Manual Therapy                      [x]? Splinting                                   [x]? Scar Management                   []?Joint Protection/Training  []? Ergonomics                             []? Joint Mobilization                      []? Adaptive Equipment Assessment/Training                             [x]?  Manual Edema Mobilization   []? Myofascial Release                 []? Energy Conservation/Work Simplification  [x]? GM/FM Coordination                []? Safety retraining/education per  individual diagnosis/goals  [x]? Desensitization        Patient Specific Goal: \" I need to heal so I can get back to normal\"                             GOALS (Long term same as Short term):  1) Patient will demonstrate good understanding of home program (exercises/activities/diagnosis/prognosis/goals) with good accuracy. Goal in progress - pt showing good follow thru. 2) Patient will demonstrate increased active/passive range of motion of their left hand/ wrist to [facility] for ADL/IADL completion. Goal making good gains. 3) Patient will demonstrate /pinch strength of at least 30 / 5 pinch pounds of their left hand. Goal initiated this date for gripping only - will progress a tolerated. Inda Brittle 4) Patient to report decreased pain in their affected left distal upper extremity from 3-10/10 with light use to 3/10 or less with resistive functional use. Goal making fair gains. 5) Increase in fine motor function as evidenced by <30 sec time to complete 9-hole peg test.GoaL MET. 6) Patient to demonstrate decreased guarding of their affected extremity from >75% to 20% or less. Goal making good gains. Has decreased to 40%.   7) Patient will demonstrate a non-tender/non-adherent scar. Goal making good gains. 8) Patient will report ADL/ IADL  functions as I using the left wrist/ hand. Goal making fair gains. Gurdeep Barron 9) Patient will decrease QuickDASH score to 30% or less for increased participation in daily functional activities. Goal making fair gains.       Pain Level:   0-2/10 stiff in the L  thumb    Subjective: \"I think I need a smaller black brace since my swelling is less. I am doing the putty at home and am not ^'ing my pain. \"   Objective:  Updated POC to be completed by 2/6/2022. INTERVENTION: COMPLETED: SPECIFICS/COMMENTS:   Modality:     Fluidotherapy L  10 min/ low heat in conjunction with wrist/ thumb AROM   US L thumb/ wrist   5 min, 3.3 MHz, 50% with attention to the web space and thumb/ radial wrist- tolerated well   AROM:     Wrist/ Hand  AROM/ AAROM     x         - tenodesis as tolerated/ with gentle assist on wrist extension  - grasp release with cones to increase thumb ABD  - Jux-a-ciser with no  compensation -  ^ 10  rep's - elbow on table   - table top ball roll- wrist flex stretch 5 sec - wrist ext 15 sec pushing further with R hand - 10 rep's    Thumb AROM/ AAROM X  X              X   - blocking ex at IP and MP  - AROM all planes with gentle assist as needed/ adding circumduction  - exercise balls- 20x each direction/ improving  -  prehension with pom poms to Sf only - overextend upon release.  - in hand manipulation and placed with small pom poms and coins  - cones grasp and release with 5 sec stretch hold to stretch holcomb abduction   - alternately opposition with over release - pom poms- buttons this date - to all fingers.   - red sponge -place by SF MP -working thumb MP flexion keeping IP straight - 15 rep's      x -thumb radial and palmar abduction        PROM/Stretching:     Gentle PROM wrist X  x - completed as tolerated  - prayer stretch 5 rep's - 15 sec hold - stretch only - no pain- added to HEP    Thumb all planes x circumduction and all joints    Scar Mass/Edema Control:     Scar massage X  x - less numbness on incisions  -gel sleeve and or scar bandaide for thumb- able to  to wear at night  X large. Soft tissue mob x - focus on the thenar muscles and wrist. Massage to adductor muscles of thumb-added to HEP- therapist did internittenlty between exercises this date- 3 sets of thumb stretching - no pain. Strengthening:     Hand        x - yellow putty - gentle gripping- fingers only  and manipulation with thumb and fingers 1.5 min - sl ^ in pain so stopped. Added to HEP   -lateral pinch with light yellow resistive clip picking up 30 beads  - green putty - pinch disc's in with lateral pinch 10 rep's only   Wrist/forearm x    x 1# wt - wrist ext, wrist flex and RD - UD - ^ to 15 rep's each- not full ROM - hyacinth well. - yellow therabar ( 6 #) - bend down, twist and bend up 15 rep's each   Other:     HEP x Scar massage, blocking ex IP/MP, CMC AROM all planes, wrist tenodesis as tolerated, thumb circumduction, putty exercises - see above   Splinting- medium L comfort cool brace x Pt provided with a L medium comfort cool support as a transition from her hard thumb spica splint. Pt is to use it as needed. Fitted with a small today 2* to the meduim being too big. Assessment/Comments: Pt is making Good- progress toward stated plan of care. Increased strengthening activities this date-tolerated well with no ^ in pain. Will continue to progress as tolerated. Per 's note on 1/11/2022:  Plan:  Patient is to continue with therapy. At this point she is able to start working on her strengthening as well as continue range of motion exercises. Patient requesting lumbar prescription of Norco.  This will be filled and be her last prescription. Continue continue range of motion as well as strengthening exercises at home. She may start to gradually return to normal activities at 3 months postop.   At this point patient may follow-up as needed she instructed to call the office if she has any concerns or if she is to schedule an appointment.      -Rehab Potential: Good  -Requires OT Follow Up: Yes  Time In:   0745            Time Out: 0845         Visit #: 10    Treatment Charges: Mins Units   Modalities: FL/ US 0/0    Ther Exercise 30 2   Manual Therapy 20 1   Thera Activities 10 1   ADL/Home Mgt      Neuro Re-education     Group Therapy     Non-Billable Service Time     Other     Total Time/Units 60 4       -Response to Treatment: Pt feels her hand is getting much better. She is happy her preoperative thumb pains are resolving slowly. .   Goals: Goals for pt can be seen on initial eval occurring on 12-15-21 and on 1/6/2022    Plan:   [x]  Continue Plan of care: Continue AROM /PROM  ex and strengthening as tolerated. Will treat pt 2 x's a week for 4 - 8 sessions depending on how well she tolerates strengthening to work on the above goals. Pt education continues at each visit to obtain maximum benefits from skilled OT intervention.   []  Alter Plan of care:   []  Discharge:      Ana María Akbar OT/HARMAN, CHT

## 2022-01-20 ENCOUNTER — TREATMENT (OUTPATIENT)
Dept: OCCUPATIONAL THERAPY | Age: 57
End: 2022-01-20
Payer: COMMERCIAL

## 2022-01-20 DIAGNOSIS — M18.12 PRIMARY OSTEOARTHRITIS OF FIRST CARPOMETACARPAL JOINT OF LEFT HAND: Primary | ICD-10-CM

## 2022-01-20 PROCEDURE — 97530 THERAPEUTIC ACTIVITIES: CPT | Performed by: OCCUPATIONAL THERAPIST

## 2022-01-20 PROCEDURE — 97110 THERAPEUTIC EXERCISES: CPT | Performed by: OCCUPATIONAL THERAPIST

## 2022-01-20 PROCEDURE — 97140 MANUAL THERAPY 1/> REGIONS: CPT | Performed by: OCCUPATIONAL THERAPIST

## 2022-01-20 NOTE — PROGRESS NOTES
[facility] [address] [facility] [address]  Dept: [address] OT Fax: [phone]    OCCUPATIONAL THERAPY PROGRESS NOTE    Date:  2022  Initial Evaluation Date: 12-15-21    Patient Name:  Yenny Zarate    :  1965    Restrictions/Precautions:  Left hand ROM as tolerated, wean from brace, modalities prn, no strengthening till 8 weeks , Low fall risk  Diagnosis:  M18.12 (ICD-10-CM) - Arthritis of carpometacarpal (CMC) joint of left thumb                                                   Date of Surgery/Injury: 21  ( 9 weeks post op)     Insurance/Certification information:  [facility]  Plan of care signed (Y/N): Y (thru 3-15-22)  Visit# / total visits:  to 16 if needed,      Referring Practitioner:  Dr Candance Jackson  Specific Practitioner Orders: OT evaluate and treat: per last office note : Patient referred to therapy. Okay to wean out of splint. Start early ROM exercises. Strengthening at 6-8 weeks post op.     Assessment of current deficits   []? Functional mobility             [x]? ADLs          [x]? Strength                  []? Cognition   []? Functional transfers           [x]? IADLs         []? Safety Awareness  []? Endurance   [x]? Fine Motor Coordination    []? Balance      []? Vision/perception   [x]? Sensation     []? Gross Motor Coordination [x]? ROM           [x]? Pain                        [x]? Edema          [x]? Scar Adhesion/Skin Integrity      OT PLAN OF CARE   OT POC based on physician orders, patient diagnosis and results of clinical assessment     Frequency/Duration: 2x/ week x 6 weeks  Specific OT Treatment to include:      [x]? Instruction in HEP                   Modalities:  [x]?  Therapeutic Exercise                 [x]? Ultrasound               []? Electrical Stimulation/Attended  [x]? PROM/Stretching                    [x]? Fluidotherapy          [x]?   Paraffin                   [x]? AAROM  [x]?  AROM   []? Iontophoresis:   []? Tendon Marshel Blower  []? Neuromuscular Re-Ed            []? ADL/IADL re-training    [x]?  Therapeutic Activity                  [x]? Pain Management with/without modalities PRN                 [x]?  Manual Therapy                      [x]? Splinting                                   [x]? Scar Management                   []?Joint Protection/Training  []? Ergonomics                             []? Joint Mobilization                      []? Adaptive Equipment Assessment/Training                             [x]?  Manual Edema Mobilization   []? Myofascial Release                 []? Energy Conservation/Work Simplification  [x]? GM/FM Coordination                []? Safety retraining/education per  individual diagnosis/goals  [x]? Desensitization        Patient Specific Goal: \" I need to heal so I can get back to normal\"                             GOALS (Long term same as Short term):  1) Patient will demonstrate good understanding of home program (exercises/activities/diagnosis/prognosis/goals) with good accuracy. Goal in progress - pt showing good follow thru. 2) Patient will demonstrate increased active/passive range of motion of their left hand/ wrist to [facility] for ADL/IADL completion. Goal making good gains. 3) Patient will demonstrate /pinch strength of at least 30 / 5 pinch pounds of their left hand. Goal initiated this date for gripping only - will progress a tolerated. Lindsay Flannery 4) Patient to report decreased pain in their affected left distal upper extremity from 3-10/10 with light use to 3/10 or less with resistive functional use. Goal making fair gains. 5) Increase in fine motor function as evidenced by <30 sec time to complete 9-hole peg test.GoaL MET. 6) Patient to demonstrate decreased guarding of their affected extremity from >75% to 20% or less. Goal making good gains. Has decreased to 40%.   7) Patient will demonstrate a non-tender/non-adherent scar. Goal making good gains. 8) Patient will report ADL/ IADL  functions as I using the left wrist/ hand. Goal making fair gains. Deann Ferro 9) Patient will decrease QuickDASH score to 30% or less for increased participation in daily functional activities. Goal making fair gains.       Pain Level:   No pain at tx start. 0-2/10 stiff in the L  thumb    Subjective: \"I am using my hand more and more. I am really happy with how it turmned out. \"  Objective:  Updated POC to be completed by 2/6/2022. INTERVENTION: COMPLETED: SPECIFICS/COMMENTS:   Modality:     Fluidotherapy L x 10 min/ low heat in conjunction with wrist/ thumb AROM   US L thumb/ wrist   5 min, 3.3 MHz, 50% with attention to the web space and thumb/ radial wrist- tolerated well   AROM:     Wrist/ Hand  AROM/ AAROM x             - Jux-a-ciser with no  compensation -  ^ 10  rep's - elbow on table   - table top ball roll- wrist flex stretch 5 sec - wrist ext 15 sec pushing further with R hand - 10 rep's    Thumb AROM/ AAROM X  X                 - blocking ex at IP and MP  - AROM all planes with gentle assist as needed/ adding circumduction  - in hand manipulation and placed with small pom poms and coins  - cones grasp and release with 5 sec stretch hold to stretch holcomb abduction   - alternately opposition with over release - pom poms- buttons this date - to all fingers. - red sponge -place by SF MP -working thumb MP flexion keeping IP straight - 15 rep's      x -thumb radial and palmar abduction        PROM/Stretching:     Gentle PROM wrist X  x - completed as tolerated  - prayer stretch 5 rep's - 15 sec hold - stretch only - no pain- added to HEP    Thumb all planes x circumduction and all joints    Scar Mass/Edema Control:     Scar massage X  x - less numbness on incisions  -gel sleeve and or scar bandaide for thumb- able to  to wear at night  X large.      Soft tissue mob x - focus on the thenar muscles and wrist. Massage to adductor muscles of thumb-added to HEP- therapist did internittenlty between exercise   Strengthening:     Hand  x      x - yellow putty - gentle gripping- fingers only  and manipulation with thumb and fingers 1.5 min , rolling and 3 jaw pinch, grasp and pull  -lateral pinch with light yellow resistive clip picking up 30 beads  - green putty - pinch disc's in with lateral pinch 10 rep's only   Wrist/forearm x  x 1# wt - wrist ext, wrist flex and RD - UD - 15 rep's each  - yellow therabar ( 6 #) - bend down, twist and bend up 15 rep's each   Other:     HEP x Scar massage, blocking ex IP/MP, CMC AROM all planes, wrist tenodesis as tolerated, thumb circumduction, putty exercises - see above   Splinting- medium L comfort cool brace X    x Pt provided with a L medium comfort cool support as a transition from her hard thumb spica splint. Pt is to use it as needed. Fitted with a small      Assessment/Comments: Pt is making Good- progress toward stated plan of care. Increased strengthening activities this date-tolerated well with no ^ in pain. Will continue to progress as tolerated    -Rehab Potential: Good  -Requires OT Follow Up: Yes  Time In:   0735            Time Out: 0820        Visit #: 11    Treatment Charges: Mins Units   Modalities: FL/ US 0/0    Ther Exercise 20 1   Manual Therapy 10 1   Thera Activities 15 1   ADL/Home Mgt      Neuro Re-education     Group Therapy     Non-Billable Service Time     Other     Total Time/Units 45 3       -Response to Treatment: Pt feels she can use her hand more and with significantly less pain. Goals: Goals for pt can be seen on initial eval occurring on 12-15-21 and on 1/6/2022    Plan:   [x]  Continue Plan of care: Continue AROM /PROM  ex and strengthening as tolerated. Will treat pt 2 x's a week for 4 - 8 sessions depending on how well she tolerates strengthening to work on the above goals.   Pt education continues at each visit to obtain maximum benefits from skilled OT intervention.   []  [facility] of care:   []  Discharge:      Harry Laughlin, OT/L, [id number]

## 2022-01-25 ENCOUNTER — TREATMENT (OUTPATIENT)
Dept: OCCUPATIONAL THERAPY | Age: 57
End: 2022-01-25
Payer: COMMERCIAL

## 2022-01-25 DIAGNOSIS — M18.12 PRIMARY OSTEOARTHRITIS OF FIRST CARPOMETACARPAL JOINT OF LEFT HAND: Primary | ICD-10-CM

## 2022-01-25 PROCEDURE — 97530 THERAPEUTIC ACTIVITIES: CPT | Performed by: OCCUPATIONAL THERAPIST

## 2022-01-25 PROCEDURE — 97140 MANUAL THERAPY 1/> REGIONS: CPT | Performed by: OCCUPATIONAL THERAPIST

## 2022-01-25 PROCEDURE — 97110 THERAPEUTIC EXERCISES: CPT | Performed by: OCCUPATIONAL THERAPIST

## 2022-01-25 PROCEDURE — 97022 WHIRLPOOL THERAPY: CPT | Performed by: OCCUPATIONAL THERAPIST

## 2022-01-25 NOTE — PROGRESS NOTES
[address] [facility] [address]  Dept: [address] OT Fax: [phone]    OCCUPATIONAL THERAPY PROGRESS NOTE    Date:  2022  Initial Evaluation Date: 12-15-21    Patient Name:  Fredrick Patel    :  1965    Restrictions/Precautions:  Left hand ROM as tolerated, wean from brace, modalities prn, no strengthening till 8 weeks , Low fall risk  Diagnosis:  M18.12 (ICD-10-CM) - Arthritis of carpometacarpal (CMC) joint of left thumb                                                   Date of Surgery/Injury: 21  ( 9 weeks post op)     Insurance/Certification information:  [facility] Choice  Plan of care signed (Y/N): Y (thru 3-15-22)  Visit# / total visits:  to 16 if needed,      Referring Practitioner:  Dr Chip Olivares  Specific Practitioner Orders: OT evaluate and treat: per last office note : Patient referred to therapy. Okay to wean out of splint. Start early ROM exercises. Strengthening at 6-8 weeks post op.     Assessment of current deficits   []? Functional mobility             [x]? ADLs          [x]? Strength                  []? Cognition   []? Functional transfers           [x]? IADLs         []? Safety Awareness  []? Endurance   [x]? Fine Motor Coordination    []? Balance      []? Vision/perception   [x]? Sensation     []? Gross Motor Coordination [x]? ROM           [x]? Pain                        [x]? Edema          [x]? Scar Adhesion/Skin Integrity      OT PLAN OF CARE   OT POC based on physician orders, patient diagnosis and results of clinical assessment     Frequency/Duration: 2x/ week x 6 weeks  Specific OT Treatment to include:      [x]? Instruction in HEP                   Modalities:  [x]?  Therapeutic Exercise                 [x]? Ultrasound               []? Electrical Stimulation/Attended  [x]? PROM/Stretching                    [x]? Fluidotherapy          [x]?   Paraffin                   [x]? AAROM  [x]?  AROM   []? Iontophoresis:   []? Tendon Lizbeth Randolph  []? Neuromuscular Re-Ed            []? ADL/IADL re-training    [x]?  Therapeutic Activity                  [x]? Pain Management with/without modalities PRN                 [x]?  Manual Therapy                      [x]? Splinting                                   [x]? Scar Management                   []?Joint Protection/Training  []? Ergonomics                             []? Joint Mobilization                      []? Adaptive Equipment Assessment/Training                             [x]?  Manual Edema Mobilization   []? Myofascial Release                 []? Energy Conservation/Work Simplification  [x]? GM/FM Coordination                []? Safety retraining/education per  individual diagnosis/goals  [x]? Desensitization        Patient Specific Goal: \" I need to heal so I can get back to normal\"                             GOALS (Long term same as Short term):  1) Patient will demonstrate good understanding of home program (exercises/activities/diagnosis/prognosis/goals) with good accuracy. Goal in progress - pt showing good follow thru. 2) Patient will demonstrate increased active/passive range of motion of their left hand/ wrist to [facility] for ADL/IADL completion. Goal making good gains. 3) Patient will demonstrate /pinch strength of at least 30 / 5 pinch pounds of their left hand. Goal initiated this date for gripping only - will progress a tolerated. Brittany Banks 4) Patient to report decreased pain in their affected left distal upper extremity from 3-10/10 with light use to 3/10 or less with resistive functional use. Goal making fair gains. 5) Increase in fine motor function as evidenced by <30 sec time to complete 9-hole peg test.GoaL MET. 6) Patient to demonstrate decreased guarding of their affected extremity from >75% to 20% or less. Goal making good gains. Has decreased to 40%.   7) Patient will demonstrate a non-tender/non-adherent scar. Goal making good gains. 8) Patient will report ADL/ IADL  functions as I using the left wrist/ hand. Goal making fair gains. Marnell Records 9) Patient will decrease QuickDASH score to 30% or less for increased participation in daily functional activities. Goal making fair gains.       Pain Level:   No pain at tx start. 0-2/10 stiff in the L  thumb    Subjective: \"I think I am about ready for discharge. I think it is really a lot better\". Objective:  Updated POC to be completed by 2/6/2022. INTERVENTION: COMPLETED: SPECIFICS/COMMENTS:   Modality:     Fluidotherapy L x 10 min/ low heat in conjunction with wrist/ thumb AROM   US L thumb/ wrist   5 min, 3.3 MHz, 50% with attention to the web space and thumb/ radial wrist- tolerated well   AROM:     Wrist/ Hand  AROM/ AAROM x             - Jux-a-ciser with no  compensation -  ^ 10  rep's - elbow on table   - table top ball roll- wrist flex stretch 5 sec - wrist ext 15 sec pushing further with R hand - 10 rep's    Thumb AROM/ AAROM X  X                 - blocking ex at IP and MP  - AROM all planes with gentle assist as needed/ adding circumduction  - in hand manipulation and placed with small pom poms and coins  - cones grasp and release with 5 sec stretch hold to stretch holcomb abduction   - alternately opposition with over release - pom poms- buttons this date - to all fingers. - red sponge -place by SF MP -working thumb MP flexion keeping IP straight - 15 rep's      x -thumb radial and palmar abduction        PROM/Stretching:     Gentle PROM wrist X  x - completed as tolerated  - prayer stretch 5 rep's - 15 sec hold - stretch only - no pain- added to HEP    Thumb all planes x circumduction and all joints    Scar Mass/Edema Control:     Scar massage X  x - less numbness on incisions  -gel sleeve and or scar bandaide for thumb- able to  to wear at night  X large.      Soft tissue mob x - focus on the thenar muscles and wrist. Massage to adductor muscles of thumb-added to HEP- therapist did internittenlty between exercise   Strengthening:     Hand  x      X  X  x - yellow putty - gentle gripping- fingers only  and manipulation with thumb and fingers 1.5 min , rolling and 3 jaw pinch, grasp and pull  -lateral pinch with light yellow resistive clip picking up 30 beads  - green putty - pinch disc's in with lateral pinch 10 rep's only  - putty ex (xsoft) with tools- hook, large lid  - Hand gripper (grey- rung 2) 2-20 gripping   Wrist/forearm x  x 1# wt - wrist ext, wrist flex and RD - UD - ^20 rep's each  - yellow therabar ( 6 #) - bend down, twist and bend up ^20 rep's each   Other:     HEP x Scar massage, blocking ex IP/MP, CMC AROM all planes, wrist tenodesis as tolerated, thumb circumduction, putty exercises - see above   Splinting- medium L comfort cool brace X    x Pt provided with a L medium comfort cool support as a transition from her hard thumb spica splint. Pt is to use it as needed. Fitted with a small      Assessment/Comments: Pt is making Good- progress toward stated plan of care. Strengthening continued today with good tolerance. The biggest limitation is with exercises to improve  strength. Will continue.     -Rehab Potential: Good  -Requires OT Follow Up: Yes  Time In:   0730            Time Out: 0820        Visit #: 11    Treatment Charges: Mins Units   Modalities: FL/ US 10/0 1   Ther Exercise 15 1   Manual Therapy 10 1   Thera Activities 15 1   ADL/Home Mgt      Neuro Re-education     Group Therapy     Non-Billable Service Time     Other     Total Time/Units 50 4       -Response to Treatment: Pt feels she is doing really well and is ready for discharge. Will re-evaluate at next visit. Goals: Goals for pt can be seen on initial eval occurring on 12-15-21 and on 1/6/2022    Plan:   [x]  Continue Plan of care: Re-evaluate at next visit and prepare for discharge.  .  Pt education continues at each visit to obtain maximum benefits from skilled OT intervention.   []  [facility] of care:   []  Discharge:      Aaron Packer, OT/L, [id number]

## 2022-01-27 ENCOUNTER — TREATMENT (OUTPATIENT)
Dept: OCCUPATIONAL THERAPY | Age: 57
End: 2022-01-27
Payer: COMMERCIAL

## 2022-01-27 DIAGNOSIS — M18.12 PRIMARY OSTEOARTHRITIS OF FIRST CARPOMETACARPAL JOINT OF LEFT HAND: Primary | ICD-10-CM

## 2022-01-27 PROCEDURE — 97110 THERAPEUTIC EXERCISES: CPT | Performed by: OCCUPATIONAL THERAPIST

## 2022-01-27 PROCEDURE — 97530 THERAPEUTIC ACTIVITIES: CPT | Performed by: OCCUPATIONAL THERAPIST

## 2022-01-27 PROCEDURE — 97022 WHIRLPOOL THERAPY: CPT | Performed by: OCCUPATIONAL THERAPIST

## 2022-01-27 NOTE — PROGRESS NOTES
[facility] [address] [facility] [id number]  Dept: [address] OT Fax: [phone]    OCCUPATIONAL THERAPY   PROGRESS NOTE/ DISCHARGE NOTE    Date:  2022  Initial Evaluation Date: 12-15-21    Patient Name:  Carissa Sagastume    :  1965    Restrictions/Precautions:  Left hand ROM as tolerated, wean from brace, modalities prn, no strengthening till 8 weeks , Low fall risk  Diagnosis:  M18.12 (ICD-10-CM) - Arthritis of carpometacarpal (CMC) joint of left thumb                                                   Date of Surgery/Injury: 21      Insurance/Certification information:  [facility]  Plan of care signed (Y/N): Y (thru 3-15-22)  Visit# / total visits:  to 16 if needed,      Referring Practitioner:  Dr Dayana Ortiz  Specific Practitioner Orders: OT evaluate and treat: per last office note : Patient referred to therapy. Okay to wean out of splint. Start early ROM exercises. Strengthening at 6-8 weeks post op.     Assessment of current deficits   []? Functional mobility             [x]? ADLs          [x]? Strength                  []? Cognition   []? Functional transfers           [x]? IADLs         []? Safety Awareness  []? Endurance   [x]? Fine Motor Coordination    []? Balance      []? Vision/perception   [x]? Sensation     []? Gross Motor Coordination [x]? ROM           [x]? Pain                        [x]? Edema          [x]? Scar Adhesion/Skin Integrity      OT PLAN OF CARE   OT POC based on physician orders, patient diagnosis and results of clinical assessment     Frequency/Duration: 2x/ week x 6 weeks  Specific OT Treatment to include:      [x]? Instruction in HEP                   Modalities:  [x]?  Therapeutic Exercise                 [x]? Ultrasound               []? Electrical Stimulation/Attended  [x]? PROM/Stretching                    [x]? Fluidotherapy          [x]?   Paraffin                   [x]? AAROM  [x]?  AROM   []? Iontophoresis:   []? Tendon [place]  []? Neuromuscular Re-Ed            []? ADL/IADL re-training    [x]?  Therapeutic Activity                  [x]? Pain Management with/without modalities PRN                 [x]?  Manual Therapy                      [x]? Splinting                                   [x]? Scar Management                   []?Joint Protection/Training  []? Ergonomics                             []? Joint Mobilization                      []? Adaptive Equipment Assessment/Training                             [x]?  Manual Edema Mobilization   []? Myofascial Release                 []? Energy Conservation/Work Simplification  [x]? GM/FM Coordination                []? Safety retraining/education per  individual diagnosis/goals  [x]? Desensitization        Patient Specific Goal: \" I need to heal so I can get back to normal\"                             GOALS (Long term same as Short term): UPDATED 1-27-22  1) Patient will demonstrate good understanding of home program (exercises/activities/diagnosis/prognosis/goals) with good accuracy. Goal met for edema control, scar mgmt, ROM and strengthening)  2) Patient will demonstrate increased active/passive range of motion of their left hand/ wrist to [facility] for ADL/IADL completion. Goal met as stated below  3) Patient will demonstrate /pinch strength of at least 30 / 5 pinch pounds of their left hand. Goal met for prehension strength.  strength remains slow progressl  4) Patient to report decreased pain in their affected left distal upper extremity from 3-10/10 with light use to 3/10 or less with resistive functional use. Goal met/ Pain averages  0-2/10 with rare increases  5) Increase in fine motor function as evidenced by <30 sec time to complete 9-hole peg test.Goal MET. (1-6-22)  6) Patient to demonstrate decreased guarding of their affected extremity from >75% to 20% or less.  Goal progress to 0-30% or less depending on the activity  7) Patient will demonstrate a non-tender/non-adherent scar. Goal met/ no complaints  8) Patient will report ADL/ IADL  functions as I using the left wrist/ hand. Goal making great gains- she is able to complete light to moderate activity at home without use of her thumb brace. She is also able to do some heavy activity with use of the brace. She is happy with her status. 9) Patient will decrease QuickDASH score to 30% or less for increased participation in daily functional activities. Goal met at 27% impairment      Pain Level:   1/10 in the left thumb/ was a 6/10 yesterday  Subjective: \"I was miserable yesterday in my wrist. I am trying to decrease the anti-inflammatory and we worked the wrist hard the day before. It is better now but I had to take the medication. \"     Objective:  Updated POC to be completed by 2/6/2022. INTERVENTION: COMPLETED: SPECIFICS/COMMENTS:   Modality:     Fluidotherapy L x 10 min/ low heat in conjunction with wrist/ thumb AROM   US L thumb/ wrist   5 min, 3.3 MHz, 50% with attention to the web space and thumb/ radial wrist- tolerated well   AROM:     Thumb AROM/ AAROM X  X                 - blocking ex at IP and MP  - AROM all planes with gentle assist as needed/ adding circumduction  - in hand manipulation and placed with small pom poms and coins  - cones grasp and release with 5 sec stretch hold to stretch holcomb abduction   - alternately opposition with over release - pom poms- buttons this date - to all fingers.   - red sponge -place by SF MP -working thumb MP flexion keeping IP straight - 15 rep's      x -thumb radial and palmar abduction        PROM/Stretching:     Gentle PROM wrist  - completed as tolerated  - prayer stretch 5 rep's - 15 sec hold - stretch only - no pain- added to HEP    Thumb all planes  circumduction and all joints    Scar Mass/Edema Control:     Scar massage    - less numbness on incisions  -gel sleeve and or scar bandaide for thumb- able to  to wear at night  X large. Soft tissue mob  - focus on the thenar muscles and wrist. Massage to adductor muscles of thumb-added to HEP- therapist did internittenlty between exercise   Strengthening:     Hand   - yellow putty - gentle gripping- fingers only  and manipulation with thumb and fingers 1.5 min , rolling and 3 jaw pinch, grasp and pull  -lateral pinch with light yellow resistive clip picking up 30 beads  - green putty - pinch disc's in with lateral pinch 10 rep's only  - putty ex (xsoft) with tools- hook, large lid  - Hand gripper (grey- rung 2) 2-20 gripping   Wrist/forearm    1# wt - wrist ext, wrist flex and RD - UD - ^20 rep's each  - yellow therabar ( 6 #) - bend down, twist and bend up ^20 rep's each   Other:     HEP x Scar massage, blocking ex IP/MP, CMC AROM all planes, wrist tenodesis as tolerated, thumb circumduction, putty exercises - see above   Splinting- medium L comfort cool brace X    x Pt provided with a L medium comfort cool support as a transition from her hard thumb spica splint. Pt is to use it as needed. Fitted with a small      Assessment/Comments: Pt is making Good progress toward stated plan of care. She feels she has recovered well and desires discharge. Good progress is noted in most areas except for hand  strength. Pt feels her HEP is comprehensive enough that she can work on it at home.  Update is as follows:         Left UE AROM          12-15-21           1/6 /22  1-27-22   Wrist flexion           0-50                87*  87    Wrist extension           0-30                56*  50    RD           0-25                25*  25    UD           0-6                35*  45    Thumb IP           0-45                53*  60    Thumb MP           15-30                30*  35   Thumb radial abdcution            0-23                 0/47*  0/48    Thumb Palmar ABD            25-35                0/ 54*  0-47                QuickDASH Score: 57% disability/   To 43% disability/  to 27% disability    Dynamometer (setting 2): assessed this date. Left:   8#   to 8# with discomfort in the thumb    (no change)                                  Right: 41#                                  Pinch Meter:        Lateral: Left=  2.5# to 5#      Right= 11#         Palmar 3 point: Left= 3# to 6#,  Right= 10#      -Rehab Potential: Good  -Requires OT Follow Up: Yes  Time In:   0730            Time Out: 0810        Visit #: 13    Treatment Charges: Mins Units   Modalities: FL/ US 10/0 1   Ther Exercise 15 1   Manual Therapy     Thera Activities 15 1   ADL/Home Mgt      Neuro Re-education     Group Therapy     Non-Billable Service Time     Other     Total Time/Units 40 3       -Response to Treatment: Pt feels she is doing really well and is ready for discharge. Goals: Goals for pt can be seen on initial eval occurring on 12-15-21 and on 1/6/2022    Plan:   []  Continue Plan of care: Rometta Favre Pt education continues at each visit to obtain maximum benefits from skilled OT intervention.   []  Alter Plan of care:   [x]  Discharge: with [address], OT/L, [id number]

## 2022-04-27 ENCOUNTER — HOSPITAL ENCOUNTER (OUTPATIENT)
Dept: GENERAL RADIOLOGY | Age: 57
Discharge: HOME | End: 2022-04-29
Payer: COMMERCIAL

## 2022-04-27 DIAGNOSIS — Z12.31 ENCOUNTER FOR SCREENING MAMMOGRAM FOR MALIGNANT NEOPLASM OF BREAST: ICD-10-CM

## 2022-04-27 PROCEDURE — 77063 BREAST TOMOSYNTHESIS BI: CPT

## 2022-12-16 NOTE — PROGRESS NOTES
Stuart Lemon was seen and treated in our emergency department on 12/16/2022. He may return to work on 12/18/2022. If you have any questions or concerns, please don't hesitate to call.       Anny Gutierrez, JAIME - CNP [address] [place] [address]    OCCUPATIONAL THERAPY PROGRESS NOTE    Date:  2021  Initial Evaluation Date: 21    Patient Name:  Alix Miller    :  1965  Restrictions/Precautions: Follow flexor tendon/  Passive flexion and active ext protocol, Can start AROM at 4 weeks post op - 21, low fall risk  Diagnosis:  Thumb FPL rupture/repair L hand   S66.819A (ICD-10-CM) - Rupture of flexor tendon of hand, initial encounter                                                             Insurance/Certification information:  [facility]   Referring Practitioner:  Dr.A Gokul Baron  Date of Surgery/Injury: 3/26/2021 (LMF DIP fusion, L FPL reconstruction with harvest extensor indicis)  Plan of care signed (Y/N): Y (thru 21)  Visit# / total visits:     Pain Level: 2/10 , sensitive and uncomfortable in the left thumb/ wrist, increases to 6/10 with ROM to that affects the [facility]. Subjective: \" I feel like this is never going to get any better\". Objective:  Updated POC to be completed by visit 10.     INTERVENTION: COMPLETED: SPECIFICS/COMMENTS:   Modality:     Paraffin x hand/ wrist x 10 min prior to  Massage and ROM        ROM exercise  All ROM completed in protective positions   AAROM thumb 10-15x each Passive flexion/ active ext thumb- Focus at IP, MP and composite    x Place and hold thumb flexion w/ extension as tolerated    x Place and hold digital long fist   Thumb AROM  x All planes as tolerated, blocking ex at IP/ MP   Wrist AROM x  AROM All planes as tolerated, wristciser        PROM/Stretching thumb             x All planes with focus on thumb extension as tolerated        Fine motor exercises     Prehension ex                     Manual techniques:     scar massage x    Soft tissue mob x Focus on the L web space and palmar hand   Strengthening:               Other:     HEP x Scar massage, thumb/ wrist AROM, passive thumb  flexion and hold   Splinting- NEW x Hand based splint fabricated to increase radial extension in the thumb   Splinting x DIP continued to prevent DIP flexion with extension ex for the thumb during functional use- is helping/ new shorter thumb spica also provided to wear in conjunction with DIP splint. Assessment/Comments: Pt is making Fair + progress toward stated plan of care. Scar massage, AROM/ AAROM and splinting for increasing radial extension completed as tolerated. ROM tolerance is improving and tightness along the flexor tendon is slowly improving. The main limitation is radial extension of the thumb/ tightness and pain in the [facility]. Daly Silva Splinting to promote radial extension stretch to the thumb tolerated well. Pain with movement improved as session progressed. AROM at the thumb IP is getting better. Will continue as tolerated. -Rehab Potential: Good  -Requires OT Follow Up: Yes  Time In: 0730            Time Out: 0830             Visit #: 7    Treatment Charges: Mins Units   Modalities: paraffin 10 1   Ther Exercise 5    Manual Therapy 20 1   Thera Activities     ADL/Home Mgt      Neuro Re-education     Group Therapy     Non-Billable Service Time     Other: ortho fit 25 2   Total Time/Units 60 4       -Response to Treatment: Pt is concerned about her condition following her last MD appointment. Support provided. Goals: Goals for pt can be seen on initial eval occurring on 4-12-21    Plan:   [x]  Continue Plan of care: Continue AROM / stretches as tolerated, splinting, scar mgmt, and edema control. Pt education continues at each visit to obtain maximum benefits from skilled OT intervention.   []  [address] of care:   []  Discharge:      Yue Fry OT/L  [id number]

## 2024-10-11 ENCOUNTER — HOSPITAL ENCOUNTER (OUTPATIENT)
Dept: GENERAL RADIOLOGY | Age: 59
Discharge: HOME | End: 2024-10-13
Payer: COMMERCIAL

## 2024-10-11 VITALS — BODY MASS INDEX: 22.65 KG/M2 | WEIGHT: 116 LBS

## 2024-10-11 DIAGNOSIS — Z12.31 ENCOUNTER FOR SCREENING MAMMOGRAM FOR BREAST CANCER: ICD-10-CM

## 2024-10-11 PROCEDURE — 77063 BREAST TOMOSYNTHESIS BI: CPT

## 2025-02-27 ENCOUNTER — OFFICE VISIT (OUTPATIENT)
Dept: PODIATRY | Age: 60
End: 2025-02-27
Payer: COMMERCIAL

## 2025-02-27 VITALS — HEIGHT: 60 IN | BODY MASS INDEX: 22.78 KG/M2 | WEIGHT: 116 LBS

## 2025-02-27 DIAGNOSIS — M79.672 PAIN IN LEFT FOOT: ICD-10-CM

## 2025-02-27 DIAGNOSIS — R26.2 DIFFICULTY WALKING: ICD-10-CM

## 2025-02-27 DIAGNOSIS — D23.72 BENIGN NEOPLASM OF SKIN OF LEFT FOOT: Primary | ICD-10-CM

## 2025-02-27 DIAGNOSIS — M79.672 LEFT FOOT PAIN: Primary | ICD-10-CM

## 2025-02-27 PROCEDURE — 3017F COLORECTAL CA SCREEN DOC REV: CPT | Performed by: PODIATRIST

## 2025-02-27 PROCEDURE — G8427 DOCREV CUR MEDS BY ELIG CLIN: HCPCS | Performed by: PODIATRIST

## 2025-02-27 PROCEDURE — 1036F TOBACCO NON-USER: CPT | Performed by: PODIATRIST

## 2025-02-27 PROCEDURE — G8420 CALC BMI NORM PARAMETERS: HCPCS | Performed by: PODIATRIST

## 2025-02-27 PROCEDURE — 99203 OFFICE O/P NEW LOW 30 MIN: CPT | Performed by: PODIATRIST

## 2025-02-27 NOTE — PROGRESS NOTES
Patient is in today for evaluation of left foot pain. Patient says she believes she has a plantar wart. Pcp is Nathaniel Keane DO  Last ov 8/28/24

## 2025-02-28 NOTE — PROGRESS NOTES
25     Allen Vasquez    : 1965 Sex: female   Age: 59 y.o.    Patient was referred by: None  Patient's PCP/Provider is:  Nathaniel Keane DO    Subjective:    Patient seen today for evaluation regarding chronic soft tissue neoplasm left foot    Chief Complaint   Patient presents with    Foot Pain     Left foot        HPI: Patient stated she has had the issue into the plantar left fifth MTPJ region for several years.  She did notice some increased discomfort recently after trying to treat the area herself.  Patient states that the area does cause discomfort with certain shoe gear and daily activities.  No other additional abnormalities noted at this time.    ROS:  Const: Positives and pertinent negatives as per HPI.     Musculo: Denies symptoms other than stated above.  Neuro: Denies symptoms other than stated above.  Skin: Denies symptoms other than stated above.    Current Medications:    Current Outpatient Medications:     valACYclovir (VALTREX) 500 MG tablet, TAKE 1 TABLET BY MOUTH TWICE  DAILY, Disp: 60 tablet, Rfl: 3    doxepin (SINEQUAN) 10 MG capsule, Take 1 capsule by mouth 2 times daily, Disp: 180 capsule, Rfl: 1    meloxicam (MOBIC) 7.5 MG tablet, Take 1 tablet by mouth daily as needed for Pain, Disp: 90 tablet, Rfl: 1    famotidine (PEPCID) 40 MG tablet, Take 1 tablet by mouth nightly as needed (heartburn), Disp: 30 tablet, Rfl: 3    esomeprazole (NEXIUM) 20 MG delayed release capsule, Take 1 capsule by mouth every morning (before breakfast), Disp: 30 capsule, Rfl: 5    psyllium (KONSYL) 28.3 % PACK, Take 1 packet by mouth daily, Disp: , Rfl:     Multiple Vitamins-Minerals (HAIR SKIN AND NAILS FORMULA PO), Take 1 tablet by mouth daily Last dose 21, Disp: , Rfl:     Allergies:  Allergies   Allergen Reactions    Bee Venom     Vicodin [Hydrocodone-Acetaminophen] Nausea And Vomiting       Vitals:    25 1458   Weight: 52.6 kg (116 lb)   Height: 1.524 m (5')        Past Medical History:

## 2025-03-17 ENCOUNTER — PREP FOR PROCEDURE (OUTPATIENT)
Dept: PODIATRY | Age: 60
End: 2025-03-17

## 2025-03-17 DIAGNOSIS — M79.672 PAIN IN LEFT FOOT: ICD-10-CM

## 2025-03-17 DIAGNOSIS — D23.72 BENIGN NEOPLASM OF SKIN OF FOOT, LEFT: ICD-10-CM

## 2025-03-19 ENCOUNTER — ANESTHESIA EVENT (OUTPATIENT)
Dept: OPERATING ROOM | Age: 60
End: 2025-03-19
Payer: COMMERCIAL

## 2025-03-19 RX ORDER — SODIUM CHLORIDE 9 MG/ML
INJECTION, SOLUTION INTRAVENOUS PRN
Status: CANCELLED | OUTPATIENT
Start: 2025-03-19

## 2025-03-19 RX ORDER — SODIUM CHLORIDE 0.9 % (FLUSH) 0.9 %
5-40 SYRINGE (ML) INJECTION PRN
Status: CANCELLED | OUTPATIENT
Start: 2025-03-19

## 2025-03-19 RX ORDER — SODIUM CHLORIDE 0.9 % (FLUSH) 0.9 %
5-40 SYRINGE (ML) INJECTION EVERY 12 HOURS SCHEDULED
Status: CANCELLED | OUTPATIENT
Start: 2025-03-19

## 2025-03-19 ASSESSMENT — LIFESTYLE VARIABLES: SMOKING_STATUS: 0

## 2025-03-19 NOTE — ANESTHESIA PRE PROCEDURE
Department of Anesthesiology  Preprocedure Note       Name:  Allen Vasquez   Age:  59 y.o.  :  1965                                          MRN:  [id number]         Date:  3/19/2025      Surgeon: Surgeon(s):  Mu Garcia Jr., ASHLI    Procedure: Procedure(s):  Excision Benign Neoplasm of Skin Left Foot-30 mins    Medications prior to admission:   Prior to Admission medications    Medication Sig Start Date End Date Taking? Authorizing Provider   meloxicam (MOBIC) 7.5 MG tablet Take 1 tablet by mouth daily as needed for Pain 3/4/25   Nathaniel Keane,    doxepin (SINEQUAN) 10 MG capsule Take 1 capsule by mouth 2 times daily 3/4/25   Nathaniel Keane,    valACYclovir (VALTREX) 500 MG tablet TAKE 1 TABLET BY MOUTH TWICE  DAILY 24   Nathaniel Keane,    famotidine (PEPCID) 40 MG tablet Take 1 tablet by mouth nightly as needed (heartburn) 24   Nathaniel Keane DO   esomeprazole (NEXIUM) 20 MG delayed release capsule Take 1 capsule by mouth every morning (before breakfast) 24   Nathaniel Keane,    psyllium (KONSYL) 28.3 % PACK Take 1 packet by mouth daily    ProviderJanay MD   Multiple Vitamins-Minerals (HAIR SKIN AND NAILS FORMULA PO) Take 1 tablet by mouth daily Last dose 21    ProviderJanay MD       Current medications:    No current facility-administered medications for this encounter.     Current Outpatient Medications   Medication Sig Dispense Refill    meloxicam (MOBIC) 7.5 MG tablet Take 1 tablet by mouth daily as needed for Pain 90 tablet 1    doxepin (SINEQUAN) 10 MG capsule Take 1 capsule by mouth 2 times daily 180 capsule 1    valACYclovir (VALTREX) 500 MG tablet TAKE 1 TABLET BY MOUTH TWICE  DAILY 60 tablet 3    famotidine (PEPCID) 40 MG tablet Take 1 tablet by mouth nightly as needed (heartburn) 30 tablet 3    esomeprazole (NEXIUM) 20 MG delayed release capsule Take 1 capsule by mouth every morning (before breakfast) 30 capsule 5    psyllium (KONSYL) 28.3

## 2025-03-20 ENCOUNTER — ANESTHESIA (OUTPATIENT)
Dept: OPERATING ROOM | Age: 60
End: 2025-03-20
Payer: COMMERCIAL

## 2025-03-20 ENCOUNTER — HOSPITAL ENCOUNTER (OUTPATIENT)
Age: 60
Setting detail: OUTPATIENT SURGERY
Discharge: HOME | End: 2025-03-20
Attending: PODIATRIST | Admitting: PODIATRIST
Payer: COMMERCIAL

## 2025-03-20 VITALS
SYSTOLIC BLOOD PRESSURE: 145 MMHG | BODY MASS INDEX: 22.58 KG/M2 | DIASTOLIC BLOOD PRESSURE: 68 MMHG | HEIGHT: 60 IN | TEMPERATURE: 98.3 F | HEART RATE: 70 BPM | RESPIRATION RATE: 16 BRPM | OXYGEN SATURATION: 97 % | WEIGHT: 115 LBS

## 2025-03-20 DIAGNOSIS — D23.72 BENIGN NEOPLASM OF SKIN OF FOOT, LEFT: ICD-10-CM

## 2025-03-20 DIAGNOSIS — M79.672 PAIN IN LEFT FOOT: ICD-10-CM

## 2025-03-20 PROCEDURE — 7100000011 HC PHASE II RECOVERY - ADDTL 15 MIN: Performed by: PODIATRIST

## 2025-03-20 PROCEDURE — 3600000012 HC SURGERY LEVEL 2 ADDTL 15MIN: Performed by: PODIATRIST

## 2025-03-20 PROCEDURE — 6360000002 HC RX W HCPCS: Performed by: PODIATRIST

## 2025-03-20 PROCEDURE — 7100000010 HC PHASE II RECOVERY - FIRST 15 MIN: Performed by: PODIATRIST

## 2025-03-20 PROCEDURE — 2580000003 HC RX 258: Performed by: ANESTHESIOLOGY

## 2025-03-20 PROCEDURE — 2500000003 HC RX 250 WO HCPCS: Performed by: PODIATRIST

## 2025-03-20 PROCEDURE — 11421 EXC H-F-NK-SP B9+MARG 0.6-1: CPT | Performed by: PODIATRIST

## 2025-03-20 PROCEDURE — 6360000002 HC RX W HCPCS: Performed by: NURSE ANESTHETIST, CERTIFIED REGISTERED

## 2025-03-20 PROCEDURE — 3600000002 HC SURGERY LEVEL 2 BASE: Performed by: PODIATRIST

## 2025-03-20 PROCEDURE — 2500000003 HC RX 250 WO HCPCS: Performed by: NURSE ANESTHETIST, CERTIFIED REGISTERED

## 2025-03-20 PROCEDURE — 2709999900 HC NON-CHARGEABLE SUPPLY: Performed by: PODIATRIST

## 2025-03-20 PROCEDURE — 3700000000 HC ANESTHESIA ATTENDED CARE: Performed by: PODIATRIST

## 2025-03-20 PROCEDURE — 3700000001 HC ADD 15 MINUTES (ANESTHESIA): Performed by: PODIATRIST

## 2025-03-20 RX ORDER — NALOXONE HYDROCHLORIDE 0.4 MG/ML
INJECTION, SOLUTION INTRAMUSCULAR; INTRAVENOUS; SUBCUTANEOUS PRN
Status: DISCONTINUED | OUTPATIENT
Start: 2025-03-20 | End: 2025-03-20 | Stop reason: HOSPADM

## 2025-03-20 RX ORDER — ONDANSETRON 2 MG/ML
INJECTION INTRAMUSCULAR; INTRAVENOUS
Status: DISCONTINUED | OUTPATIENT
Start: 2025-03-20 | End: 2025-03-20 | Stop reason: SDUPTHER

## 2025-03-20 RX ORDER — SODIUM CHLORIDE 0.9 % (FLUSH) 0.9 %
5-40 SYRINGE (ML) INJECTION PRN
Status: DISCONTINUED | OUTPATIENT
Start: 2025-03-20 | End: 2025-03-20 | Stop reason: HOSPADM

## 2025-03-20 RX ORDER — SODIUM CHLORIDE 0.9 % (FLUSH) 0.9 %
5-40 SYRINGE (ML) INJECTION EVERY 12 HOURS SCHEDULED
Status: DISCONTINUED | OUTPATIENT
Start: 2025-03-20 | End: 2025-03-20 | Stop reason: HOSPADM

## 2025-03-20 RX ORDER — SODIUM CHLORIDE 9 MG/ML
INJECTION, SOLUTION INTRAVENOUS PRN
Status: DISCONTINUED | OUTPATIENT
Start: 2025-03-20 | End: 2025-03-20 | Stop reason: HOSPADM

## 2025-03-20 RX ORDER — BUPIVACAINE HYDROCHLORIDE 5 MG/ML
INJECTION, SOLUTION PERINEURAL PRN
Status: DISCONTINUED | OUTPATIENT
Start: 2025-03-20 | End: 2025-03-20 | Stop reason: ALTCHOICE

## 2025-03-20 RX ORDER — HYDROCODONE BITARTRATE AND ACETAMINOPHEN 5; 325 MG/1; MG/1
1 TABLET ORAL EVERY 6 HOURS PRN
Status: DISCONTINUED | OUTPATIENT
Start: 2025-03-20 | End: 2025-03-20 | Stop reason: HOSPADM

## 2025-03-20 RX ORDER — SODIUM CHLORIDE, SODIUM LACTATE, POTASSIUM CHLORIDE, CALCIUM CHLORIDE 600; 310; 30; 20 MG/100ML; MG/100ML; MG/100ML; MG/100ML
INJECTION, SOLUTION INTRAVENOUS CONTINUOUS
Status: DISCONTINUED | OUTPATIENT
Start: 2025-03-20 | End: 2025-03-20 | Stop reason: HOSPADM

## 2025-03-20 RX ORDER — FENTANYL CITRATE 0.05 MG/ML
50 INJECTION, SOLUTION INTRAMUSCULAR; INTRAVENOUS EVERY 5 MIN PRN
Status: DISCONTINUED | OUTPATIENT
Start: 2025-03-20 | End: 2025-03-20 | Stop reason: HOSPADM

## 2025-03-20 RX ORDER — FENTANYL CITRATE 50 UG/ML
INJECTION, SOLUTION INTRAMUSCULAR; INTRAVENOUS
Status: DISCONTINUED | OUTPATIENT
Start: 2025-03-20 | End: 2025-03-20 | Stop reason: SDUPTHER

## 2025-03-20 RX ORDER — DROPERIDOL 2.5 MG/ML
0.62 INJECTION, SOLUTION INTRAMUSCULAR; INTRAVENOUS
Status: DISCONTINUED | OUTPATIENT
Start: 2025-03-20 | End: 2025-03-20 | Stop reason: HOSPADM

## 2025-03-20 RX ORDER — PROPOFOL 10 MG/ML
INJECTION, EMULSION INTRAVENOUS
Status: DISCONTINUED | OUTPATIENT
Start: 2025-03-20 | End: 2025-03-20 | Stop reason: SDUPTHER

## 2025-03-20 RX ORDER — MORPHINE SULFATE 2 MG/ML
1 INJECTION, SOLUTION INTRAMUSCULAR; INTRAVENOUS EVERY 5 MIN PRN
Status: DISCONTINUED | OUTPATIENT
Start: 2025-03-20 | End: 2025-03-20 | Stop reason: HOSPADM

## 2025-03-20 RX ORDER — KETOROLAC TROMETHAMINE 30 MG/ML
INJECTION, SOLUTION INTRAMUSCULAR; INTRAVENOUS
Status: DISCONTINUED | OUTPATIENT
Start: 2025-03-20 | End: 2025-03-20 | Stop reason: SDUPTHER

## 2025-03-20 RX ORDER — DIPHENHYDRAMINE HYDROCHLORIDE 50 MG/ML
12.5 INJECTION, SOLUTION INTRAMUSCULAR; INTRAVENOUS
Status: DISCONTINUED | OUTPATIENT
Start: 2025-03-20 | End: 2025-03-20 | Stop reason: HOSPADM

## 2025-03-20 RX ORDER — MIDAZOLAM HYDROCHLORIDE 1 MG/ML
INJECTION, SOLUTION INTRAMUSCULAR; INTRAVENOUS
Status: DISCONTINUED | OUTPATIENT
Start: 2025-03-20 | End: 2025-03-20 | Stop reason: SDUPTHER

## 2025-03-20 RX ORDER — MEPERIDINE HYDROCHLORIDE 25 MG/ML
12.5 INJECTION INTRAMUSCULAR; INTRAVENOUS; SUBCUTANEOUS ONCE
Status: DISCONTINUED | OUTPATIENT
Start: 2025-03-20 | End: 2025-03-20 | Stop reason: HOSPADM

## 2025-03-20 RX ADMIN — FENTANYL CITRATE 50 MCG: 50 INJECTION, SOLUTION INTRAMUSCULAR; INTRAVENOUS at 13:45

## 2025-03-20 RX ADMIN — Medication 20 MG: at 13:42

## 2025-03-20 RX ADMIN — ONDANSETRON 50 MG: 2 INJECTION INTRAMUSCULAR; INTRAVENOUS at 13:46

## 2025-03-20 RX ADMIN — KETOROLAC TROMETHAMINE 30 MG: 30 INJECTION, SOLUTION INTRAMUSCULAR at 13:53

## 2025-03-20 RX ADMIN — SODIUM CHLORIDE, POTASSIUM CHLORIDE, SODIUM LACTATE AND CALCIUM CHLORIDE: 600; 310; 30; 20 INJECTION, SOLUTION INTRAVENOUS at 13:28

## 2025-03-20 RX ADMIN — MIDAZOLAM 2 MG: 1 INJECTION INTRAMUSCULAR; INTRAVENOUS at 13:39

## 2025-03-20 RX ADMIN — FENTANYL CITRATE 50 MCG: 50 INJECTION, SOLUTION INTRAMUSCULAR; INTRAVENOUS at 13:42

## 2025-03-20 RX ADMIN — CEFAZOLIN SODIUM 2000 MG: 1 POWDER, FOR SOLUTION INTRAMUSCULAR; INTRAVENOUS at 13:39

## 2025-03-20 RX ADMIN — PROPOFOL 100 MCG/KG/MIN: 10 INJECTION, EMULSION INTRAVENOUS at 13:42

## 2025-03-20 ASSESSMENT — PAIN - FUNCTIONAL ASSESSMENT
PAIN_FUNCTIONAL_ASSESSMENT: 0-10
PAIN_FUNCTIONAL_ASSESSMENT: 0-10
PAIN_FUNCTIONAL_ASSESSMENT: NONE - DENIES PAIN

## 2025-03-20 NOTE — ANESTHESIA POSTPROCEDURE EVALUATION
Department of Anesthesiology  Postprocedure Note    Patient: Allen Vasquez  MRN: [id number]  YOB: 1965  Date of evaluation: 3/20/2025    Procedure Summary       Date: 03/20/25 Room / Location: [address]    Anesthesia Start: 1335 Anesthesia Stop: 1359    Procedure: Excision Benign Neoplasm of Skin Left Foot-30 mins (Left: Foot) Diagnosis:       Benign neoplasm of skin of foot, left      Pain in left foot      (Benign neoplasm of skin of foot, left [D23.72])      (Pain in left foot [M79.672])    Surgeons: Mu Garcia Jr., DPM Responsible Provider: Amrik Saenz MD    Anesthesia Type: MAC ASA Status: 2            Anesthesia Type: MAC    Paul Phase I: Paul Score: 10    Paul Phase II: Paul Score: 10    Anesthesia Post Evaluation    Patient location during evaluation: PACU  Patient participation: complete - patient participated  Level of consciousness: awake and alert  Airway patency: patent  Nausea & Vomiting: no nausea and no vomiting  Cardiovascular status: hemodynamically stable  Respiratory status: room air and spontaneous ventilation  Hydration status: stable  Pain management: satisfactory to patient    No notable events documented.

## 2025-03-20 NOTE — DISCHARGE INSTRUCTIONS
DR. MONTA[facility]O'S AMBULATORY PROCEDURE DISCHARGE INSTRUCTIONS  You may be drowsy or lightheaded after receiving sedation or anesthesia.    A responsible person should be with you for the next 24 hours.    Please follow the instructions checked below:    DIET INSTRUCTIONS:  [x]Start with light diet and progress to your normal diet as you feel like eating. If you experience nausea or repeated episodes of vomiting which persist beyond 12-24 hours, notify your doctor.  []Other     ACTIVITY INSTRUCTIONS:  [x]Rest today. Increase activity as tolerated    [x]Elevate operative limb   []Sling to operative limb  []No heavy lifting or strenuous activity     []No driving for ***   []Use crutches  []Use walker   [x]Weight bearing: []none /  [x]partial / []full as tolerated   []Other ***    WOUND/DRESSING INSTRUCTIONS:  Always ensure you and your care giver clean hands before and after caring for the wound.  []May shower      []May bathe      [x]Keep dressing dry            [x]Do not remove dressing   []Remove dressing on     []Leave steri strips in place    []Drain care      [x] Ice to operative site for 15-30 minutes of each hour while awake for 24-36 hours  []Use ice cooling system as instructed                  [x]Post-op Shoe-wear when up and about                     []Other ***     MEDICATION INSTRUCTIONS:    []Prescriptions sent with you.  Use as directed.  When taking pain medications, you may experience dizziness or drowsiness.  Do not drink alcohol or drive when taking these medications.  [x]You may take a non-prescription anti-inflammatory medication as needed for pain relief.  [x]Give the list of your medications to your primary care physician on your next visit. Keep your med list updated and carry it with in case of emergency  Other Instructions:                  FOLLOW-UP CARE:  [x]Call the office at [phone] with any questions or concerns prior to your scheduled postoperative appointment.  Watch for these

## 2025-03-20 NOTE — H&P
Update History & Physical     The patient's History and Physical this morning was reviewed with the patient and there were no significant changes.     I examined the patient and there were no significant changes from the previous History and Physical.     Plan: The risk, benefits, expected outcome, and alternative to the recommended procedure have been discussed with the patient.  Patient understands and wants to proceed with the procedure. The procedure discussed is 1. Excision benign neoplasm of skin left foot      Blood pressure 137/89, pulse 87, temperature 98.3 °F (36.8 °C), resp. rate 14, height 1.524 m (5'), weight 52.2 kg (115 lb), last menstrual period 08/13/2012, SpO2 99%.    Electronically signed by Mu Garcia Jr, DPM on 3/20/2025 at 1:32 PM

## 2025-03-20 NOTE — OP NOTE
Operative Note      Patient: Allen Vasquez  YOB: 1965  MRN: [id number]    Date of Procedure: 3/20/2025    Pre-Op Diagnosis Codes:      * Benign neoplasm of skin of foot, left [D23.72]     * Pain in left foot [M79.672]    Post-Op Diagnosis: Same       Procedure(s):  Excision Benign Neoplasm of Skin Left Foot-30 mins    Surgeon(s):  Mu Garcia Jr., DPM    Assistant:   * No surgical staff found *    Anesthesia: Monitor Anesthesia Care    Estimated Blood Loss (mL): Minimal    Complications: None    Specimens:   ID Type Source Tests Collected by Time Destination   A : NEOPLASM OF SKIN LEFT FOOT Tissue Tissue SURGICAL PATHOLOGY Mu Garcia Jr., DPM 3/20/2025 1353        Implants:  * No implants in log *      Drains: * No LDAs found *    Findings:  Infection Present At Time Of Surgery (PATOS) (choose all levels that have infection present):  No infection present  Other Findings: Chronic soft tissue neoplasm left foot  This procedure was not performed to treat primary cutaneous melanoma through wide local excision    Detailed Description of Procedure:   After proper preoperative evaluation, patient was brought back to the operating room and placed in the operating table in the supine position.  Monitored anesthesia was administered per anesthesia department.  Patient did receive 2 g intravenous Ancef preoperatively.  We did utilize 7 cc 0.5% Marcaine plain to anesthetize surgical site/s.  Tourniquet was placed around the patient's well-padded left ankle.  Tourniquet was inflated to 250 mils mercury then lowered to the surgical field once proper prepping and draping was performed.  Attention was directed towards the plantar aspect left fifth MTPJ area where at this time the neoplasm measuring approximately 0.7 cm in diameter was evaluated.  With use of a #15 blade the neoplasm was properly circumscribed and use of a soft tissue curette to remove the neoplasm down to the basement membrane layer  in total.  Specimen was sent to pathology for gross examination.  Cauterization was performed neoplasm site followed by an additional soft tissue curettage to remove any residual neoplastic tissue/debris.  Additional cauterization was performed to allow for proper post procedure hemostasis and surgical site healing.  Silvadene, Adaptic, dry padded dressing was then applied left foot.  Tourniquet was released with total tourniquet time being 4 minutes.  Vascularity was reestablished to the left foot and digital regions.   The patient tolerated the procedure and anesthesia well and left the operating room in stable condition.  The patient is being transported to the recovery room for post operative management.  Patient and/or caregiver was given postoperative home-going instructions and prescriptions to be taken as directed.  Patient and/or caregiver were advised to call the office with any questions or concerns prior to their scheduled postoperative appointment.       Electronically signed by Mu Garcia Jr, DPM on 3/20/2025 at 2:48 PM

## 2025-03-25 ENCOUNTER — OFFICE VISIT (OUTPATIENT)
Dept: PODIATRY | Age: 60
End: 2025-03-25

## 2025-03-25 VITALS — BODY MASS INDEX: 22.58 KG/M2 | HEIGHT: 60 IN | WEIGHT: 115 LBS

## 2025-03-25 DIAGNOSIS — D23.72 BENIGN NEOPLASM OF SKIN OF FOOT, LEFT: Primary | ICD-10-CM

## 2025-03-25 PROCEDURE — 99024 POSTOP FOLLOW-UP VISIT: CPT | Performed by: PODIATRIST

## 2025-03-25 NOTE — PROGRESS NOTES
Patient is in today for post op of left foot. Patient says she is doing well with no issues. Pcp is Nathaniel Keane DO  Last ov 3/4/25

## 2025-03-25 NOTE — PROGRESS NOTES
3/25/25     Allen ADAM Vasquez    : 1965   Sex: female    Age: 59 y.o.    Patient's PCP/Provider is:  Nathaniel Keane DO    Subjective:  Patient is seen today for follow-up regarding continued care regarding excision benign neoplasm left foot.  Overall patient is doing great at this time with minimal issues noted.  Patient is wearing her offloading shoe as instructed.  She has been trying to diminish her daily activities as instructed.  Patient is pleased with care and surgical outcome at this time.  No other additional abnormalities noted.    Chief Complaint   Patient presents with    Post-Op Check     Left foot        ROS:  Const: Positives and pertinent negatives as per HPI.    Musculo: Denies symptoms other than stated above.  Neuro: Denies symptoms other than stated above.  Skin: Denies symptoms other than stated above.    Current Medications:    Current Outpatient Medications:     meloxicam (MOBIC) 7.5 MG tablet, Take 1 tablet by mouth daily as needed for Pain, Disp: 90 tablet, Rfl: 1    doxepin (SINEQUAN) 10 MG capsule, Take 1 capsule by mouth 2 times daily, Disp: 180 capsule, Rfl: 1    valACYclovir (VALTREX) 500 MG tablet, TAKE 1 TABLET BY MOUTH TWICE  DAILY, Disp: 60 tablet, Rfl: 3    famotidine (PEPCID) 40 MG tablet, Take 1 tablet by mouth nightly as needed (heartburn), Disp: 30 tablet, Rfl: 3    esomeprazole (NEXIUM) 20 MG delayed release capsule, Take 1 capsule by mouth every morning (before breakfast), Disp: 30 capsule, Rfl: 5    psyllium (KONSYL) 28.3 % PACK, Take 1 packet by mouth daily, Disp: , Rfl:     Multiple Vitamins-Minerals (HAIR SKIN AND NAILS FORMULA PO), Take 1 tablet by mouth daily Last dose 21, Disp: , Rfl:     Allergies:  Allergies   Allergen Reactions    Bee Venom     Vicodin [Hydrocodone-Acetaminophen] Nausea And Vomiting       Vitals:    25 1452   Weight: 52.2 kg (115 lb)   Height: 1.524 m (5')       Exam:  Neurovascular status unchanged.  Neoplasm site healing without

## 2025-03-31 LAB — SURGICAL PATHOLOGY REPORT: NORMAL

## (undated) DEVICE — TRAY SET HAND REUSABLE

## (undated) DEVICE — INTENT TO BE USED WITH SUTURE MATERIAL FOR TISSUE CLOSURE: Brand: RICHARD-ALLAN® NEEDLE 1/2 CIRCLE TAPER

## (undated) DEVICE — DRAPE CARM MINI FOR IMAG SYS INSIGHT FLROSCN

## (undated) DEVICE — PEN: MARKING STD 100/CS: Brand: MEDICAL ACTION INDUSTRIES

## (undated) DEVICE — 3M™ STERI-STRIP™ REINFORCED ADHESIVE SKIN CLOSURES, R1541, 1/4 IN X 3 IN (6 MM X 75 MM), 3 STRIPS/ENVELOPE: Brand: 3M™ STERI-STRIP™

## (undated) DEVICE — OSTEOTOME SURG L5IN BLDE W15MM STR MINI LAMBOTTE

## (undated) DEVICE — BLADE SHV L13CM DIA4MM DBL CUT COOLCUT

## (undated) DEVICE — TUBING SUCT 12FR MAL ALUM SHFT FN CAP VENT UNIV CONN W/ OBT

## (undated) DEVICE — COVER HNDL LT DISP

## (undated) DEVICE — TOWEL,OR,DSP,ST,BLUE,STD,6/PK,12PK/CS: Brand: MEDLINE

## (undated) DEVICE — SPLINT ORTH W4XL15IN PLSTR OF PARIS LO EXOTHERM SMOOTH

## (undated) DEVICE — NEEDLE SPNL L3.5IN PNK HUB S STL REG WALL FIT STYL W/ QNCKE

## (undated) DEVICE — STANDARD HYPODERMIC NEEDLE,POLYPROPYLENE HUB: Brand: MONOJECT

## (undated) DEVICE — 3M™ MEDIPORE™ SOFT CLOTH TAPE, 4 INCH X 10 YARDS, 12 ROLLS/CASE, 2964: Brand: 3M™ MEDIPORE™

## (undated) DEVICE — COUNTER NDL 10 COUNT HLD 20 FOAM BLK SGL MAG

## (undated) DEVICE — GOWN,SIRUS,FABRNF,L,20/CS: Brand: MEDLINE

## (undated) DEVICE — PADDING CAST W2INXL4YD COT LO LINTING WYTEX

## (undated) DEVICE — BLADE,STAINLESS-STEEL,15,STRL,DISPOSABLE: Brand: MEDLINE

## (undated) DEVICE — NEEDLE HYPO 18GA L1.5IN PNK POLYPR HUB S STL THN WALL FILL

## (undated) DEVICE — WIPES SKIN CLOTH READYPREP 9 X 10.5 IN 2% CHLORHEX GLUCONATE CHG PREOP

## (undated) DEVICE — MASTISOL ADHESIVE LIQ 2/3ML

## (undated) DEVICE — INSTRUMENT KIT 2.5X7 MM NANO SWIVELOCK DISP

## (undated) DEVICE — SPONGE LAP W18XL18IN WHT COT 4 PLY FLD STRUNG RADPQ DISP ST

## (undated) DEVICE — CHLORAPREP 26ML ORANGE

## (undated) DEVICE — SURGICAL PROCEDURE PACK HND

## (undated) DEVICE — ELECTRODE PT RET AD L9FT HI MOIST COND ADH HYDRGEL CORDED

## (undated) DEVICE — DRESSING PETRO W3XL8IN OIL EMUL N ADH GZ KNIT IMPREG CELOS

## (undated) DEVICE — CANNULA ARTHSCP L3CM ID8MM DBL DAM 1 PC MOLD LO PROF FLNG

## (undated) DEVICE — NEEDLE SUT PASS FOR ROT CUF LABRAL REP MULTFI SCORPION

## (undated) DEVICE — 4-PORT MANIFOLD: Brand: NEPTUNE 2

## (undated) DEVICE — 1810 FOAM BLOCK NEEDLE COUNTER: Brand: DEVON

## (undated) DEVICE — 3M™ IOBAN™ 2 ANTIMICROBIAL INCISE DRAPE 6640EZ: Brand: IOBAN™ 2

## (undated) DEVICE — TIBURON EXTREMITY SHEET: Brand: CONVERTORS

## (undated) DEVICE — SUTURE PROL SZ 3-0 L18IN NONABSORBABLE BLU L19MM PS-2 3/8 8687H

## (undated) DEVICE — ANCHOR SUTURE NANO 2.5X7 MM W/ FORK EYELET SWIVELOCK: Type: IMPLANTABLE DEVICE | Site: THUMB | Status: NON-FUNCTIONAL

## (undated) DEVICE — BUR SHV L13CM DIA5.5MM 8 FLUT OVL CLEARCUT COOLCUT

## (undated) DEVICE — SUTURE SUTTAPE L40IN DIA1.3MM NONABSORBABLE WHT BLU L26.5MM AR7500

## (undated) DEVICE — SOLUTION IV IRRIG POUR BRL 0.9% SODIUM CHL 2F7124

## (undated) DEVICE — GAUZE,SPONGE,4"X4",16PLY,XRAY,STRL,LF: Brand: MEDLINE

## (undated) DEVICE — GOWN,SIRUS,NON REINFRCD,LARGE,SET IN SL: Brand: MEDLINE

## (undated) DEVICE — INSTRUMENT SYSTEM 4 BATTERY REUSABLE

## (undated) DEVICE — SOLUTION IV IRRIG WATER 1000ML POUR BRL 2F7114

## (undated) DEVICE — DOUBLE BASIN SET: Brand: MEDLINE INDUSTRIES, INC.

## (undated) DEVICE — GLOVE ORANGE PI 7   MSG9070

## (undated) DEVICE — LEAD HAND

## (undated) DEVICE — SUTURE FIBERWIRE 3-0 L18IN NONABSORBABLE BLU L26.2MM 3/8 AR7225

## (undated) DEVICE — ZIMMER® STERILE DISPOSABLE TOURNIQUET CUFF WITH PLC, DUAL PORT, SINGLE BLADDER, 18 IN. (46 CM)

## (undated) DEVICE — GOWN SURG XL LNG LEN SPUNBOND REINF VELC TIE LEV 4 IMPERV

## (undated) DEVICE — PADDING UNDERCAST W4INXL4YD COT FBR LO LINTING WYTEX

## (undated) DEVICE — BNDG,ELSTC,MATRIX,STRL,2"X5YD,LF,HOOK&LP: Brand: MEDLINE

## (undated) DEVICE — GLOVE SURG SZ 65 THK91MIL LTX FREE SYN POLYISOPRENE

## (undated) DEVICE — HEWSON SUTURE RETRIEVER: Brand: HEWSON SUTURE RETRIEVER

## (undated) DEVICE — 2.0MM ROUND SOLID CARBIDE BUR MEDIUM

## (undated) DEVICE — 5-IN-1 BARBED CONNECTOR POLYPROPYLENE 3/16 - 9/16 IN. (5 - 14.3 MM): Brand: ARGYLE

## (undated) DEVICE — INTENDED FOR TISSUE SEPARATION, AND OTHER PROCEDURES THAT REQUIRE A SHARP SURGICAL BLADE TO PUNCTURE OR CUT.: Brand: BARD-PARKER ® STAINLESS STEEL BLADES

## (undated) DEVICE — TOWEL OR BLUEE 16X26IN ST 8 PACK ORB08 16X26ORTWL

## (undated) DEVICE — CRADLE ARM W8.75XH12.5XL16IN FOAM SUPP ELEVATION VENT

## (undated) DEVICE — SYRINGE MED 10ML LUERLOCK TIP W/O SFTY DISP

## (undated) DEVICE — TRAY DRILL SYSTEM 4 REUSABLE

## (undated) DEVICE — GAUZE,SPONGE,4"X4",16PLY,STRL,LF,10/TRAY: Brand: MEDLINE

## (undated) DEVICE — ELECTRODE ES AD PED L2.5IN TEF INSUL MOD NONCORDED NDL TIP

## (undated) DEVICE — GOWN,SIRUS,FABRNF,XL,20/CS: Brand: MEDLINE

## (undated) DEVICE — COVER,LIGHT HANDLE,FLX,2/PK: Brand: MEDLINE INDUSTRIES, INC.

## (undated) DEVICE — SYRINGE,EAR/ULCER, 2 OZ, STERILE: Brand: MEDLINE

## (undated) DEVICE — NEEDLE HYPO 18GA L1.5IN PNK POLYPR HUB S STL REG BVL STR

## (undated) DEVICE — GLOVE SURG SZ 6 THK91MIL LTX FREE SYN POLYISOPRENE ANTI

## (undated) DEVICE — SLEEVE TRAC SPANDEX LAT W/ 4IN COBAN SUPERFICIAL RAD NRV PD

## (undated) DEVICE — PADDING,UNDERCAST,COTTON, 3X4YD STERILE: Brand: MEDLINE

## (undated) DEVICE — SOLUTION IRRIG 1000ML 09% SOD CHL USP PIC PLAS CONTAINER

## (undated) DEVICE — DRAPE,SHOULDER,ORTHOMAX,W/POUCH,5/CS: Brand: MEDLINE

## (undated) DEVICE — 3M™ STERI-DRAPE™ U-DRAPE, LONG 1019: Brand: STERI-DRAPE™

## (undated) DEVICE — FEEDING TUBE • RADIOPAQUE: Brand: ARGYLE

## (undated) DEVICE — SUTURE SUTTAPE FIBERLINK 1.3MM WHT BLU CLS LOOP AR7535

## (undated) DEVICE — BASIC PACK: Brand: CONVERTORS

## (undated) DEVICE — GLOVE ORANGE PI 8 1/2   MSG9085

## (undated) DEVICE — GARMENT COMPR STD FOR 17IN CALF UNIF THER FLOTRN

## (undated) DEVICE — PROBE ABLAT 90DEG ASPIR MULTIPORT BPLR RF 1 PC ELECTRD ERGO

## (undated) DEVICE — Device

## (undated) DEVICE — SUTURE FIBERLOOP 4-0 L10IN NONABSORBABLE BLU L17.9MM 3/8 AR722920

## (undated) DEVICE — GUIDEWIRE ORTHOPEDIC 0.8X100 MM TROCAR PT 1 END

## (undated) DEVICE — NEPTUNE E-SEP SMOKE EVACUATION PENCIL, COATED, 70MM BLADE, PUSH BUTTON SWITCH: Brand: NEPTUNE E-SEP

## (undated) DEVICE — GLOVE SURG SZ 85 L12IN FNGR THK94MIL STD WHT LTX FREE

## (undated) DEVICE — MEDI-VAC NON-CONDUCTIVE SUCTION TUBING: Brand: CARDINAL HEALTH

## (undated) DEVICE — DRESSING GZ XRFRM 4X4(25/BX 6BX/CS)

## (undated) DEVICE — GAUZE,SPONGE,4"X4",8PLY,STRL,LF,10/TRAY: Brand: MEDLINE

## (undated) DEVICE — MARKER,SKIN,WI/RULER AND LABELS: Brand: MEDLINE

## (undated) DEVICE — SOLUTION SURG PREP ANTIMICROBIAL 4 OZ SKIN WND EXIDINE

## (undated) DEVICE — DRAPE SURG W88XL116IN SMS BODY SPL ORTH N FEN REINF FLD PCH

## (undated) DEVICE — APPLICATOR MEDICATED 26 CC SOLUTION HI LT ORNG CHLORAPREP

## (undated) DEVICE — SYSTEM TPS ORTHO

## (undated) DEVICE — GLOVE SURG SZ 8 L12IN FNGR THK94MIL STD WHT LTX FREE

## (undated) DEVICE — TUBING PMP L16FT MAIN DISP FOR AR-6400 AR-6475

## (undated) DEVICE — STRIP,CLOSURE,WOUND,MEDI-STRIP,1/4X3: Brand: MEDLINE

## (undated) DEVICE — CLOTH SKIN PREP 2% CHG